# Patient Record
Sex: MALE | Race: WHITE | Employment: OTHER | URBAN - METROPOLITAN AREA
[De-identification: names, ages, dates, MRNs, and addresses within clinical notes are randomized per-mention and may not be internally consistent; named-entity substitution may affect disease eponyms.]

---

## 2018-08-29 ENCOUNTER — DOCUMENTATION ONLY (OUTPATIENT)
Dept: ORTHOPEDIC SURGERY | Age: 83
End: 2018-08-29

## 2018-08-29 ENCOUNTER — OFFICE VISIT (OUTPATIENT)
Dept: ORTHOPEDIC SURGERY | Age: 83
End: 2018-08-29

## 2018-08-29 VITALS
SYSTOLIC BLOOD PRESSURE: 151 MMHG | OXYGEN SATURATION: 96 % | DIASTOLIC BLOOD PRESSURE: 77 MMHG | HEIGHT: 68 IN | BODY MASS INDEX: 26.83 KG/M2 | HEART RATE: 79 BPM | WEIGHT: 177 LBS

## 2018-08-29 DIAGNOSIS — G89.29 CHRONIC RIGHT-SIDED LOW BACK PAIN WITH RIGHT-SIDED SCIATICA: Primary | ICD-10-CM

## 2018-08-29 DIAGNOSIS — M54.41 CHRONIC RIGHT-SIDED LOW BACK PAIN WITH RIGHT-SIDED SCIATICA: Primary | ICD-10-CM

## 2018-08-29 DIAGNOSIS — R29.898 RIGHT LEG WEAKNESS: ICD-10-CM

## 2018-08-29 PROBLEM — M54.40 CHRONIC RIGHT-SIDED LOW BACK PAIN WITH SCIATICA: Status: ACTIVE | Noted: 2018-08-29

## 2018-08-29 RX ORDER — OXYCODONE AND ACETAMINOPHEN 5; 325 MG/1; MG/1
TABLET ORAL
COMMUNITY
End: 2018-10-10

## 2018-08-29 RX ORDER — GLIPIZIDE 5 MG/1
TABLET ORAL
COMMUNITY
Start: 2018-07-06 | End: 2018-08-29 | Stop reason: DRUGHIGH

## 2018-08-29 RX ORDER — MELOXICAM 15 MG/1
TABLET ORAL
COMMUNITY
Start: 2018-08-27 | End: 2018-10-10

## 2018-08-29 NOTE — PATIENT INSTRUCTIONS
Back Pain: Care Instructions  Your Care Instructions    Back pain has many possible causes. It is often related to problems with muscles and ligaments of the back. It may also be related to problems with the nerves, discs, or bones of the back. Moving, lifting, standing, sitting, or sleeping in an awkward way can strain the back. Sometimes you don't notice the injury until later. Arthritis is another common cause of back pain. Although it may hurt a lot, back pain usually improves on its own within several weeks. Most people recover in 12 weeks or less. Using good home treatment and being careful not to stress your back can help you feel better sooner. Follow-up care is a key part of your treatment and safety. Be sure to make and go to all appointments, and call your doctor if you are having problems. It's also a good idea to know your test results and keep a list of the medicines you take. How can you care for yourself at home? · Sit or lie in positions that are most comfortable and reduce your pain. Try one of these positions when you lie down:  ¨ Lie on your back with your knees bent and supported by large pillows. ¨ Lie on the floor with your legs on the seat of a sofa or chair. Clovia Evener on your side with your knees and hips bent and a pillow between your legs. ¨ Lie on your stomach if it does not make pain worse. · Do not sit up in bed, and avoid soft couches and twisted positions. Bed rest can help relieve pain at first, but it delays healing. Avoid bed rest after the first day of back pain. · Change positions every 30 minutes. If you must sit for long periods of time, take breaks from sitting. Get up and walk around, or lie in a comfortable position. · Try using a heating pad on a low or medium setting for 15 to 20 minutes every 2 or 3 hours. Try a warm shower in place of one session with the heating pad. · You can also try an ice pack for 10 to 15 minutes every 2 to 3 hours.  Put a thin cloth between the ice pack and your skin. · Take pain medicines exactly as directed. ¨ If the doctor gave you a prescription medicine for pain, take it as prescribed. ¨ If you are not taking a prescription pain medicine, ask your doctor if you can take an over-the-counter medicine. · Take short walks several times a day. You can start with 5 to 10 minutes, 3 or 4 times a day, and work up to longer walks. Walk on level surfaces and avoid hills and stairs until your back is better. · Return to work and other activities as soon as you can. Continued rest without activity is usually not good for your back. · To prevent future back pain, do exercises to stretch and strengthen your back and stomach. Learn how to use good posture, safe lifting techniques, and proper body mechanics. When should you call for help? Call your doctor now or seek immediate medical care if:    · You have new or worsening numbness in your legs.     · You have new or worsening weakness in your legs. (This could make it hard to stand up.)     · You lose control of your bladder or bowels.    Watch closely for changes in your health, and be sure to contact your doctor if:    · You have a fever, lose weight, or don't feel well.     · You do not get better as expected. Where can you learn more? Go to http://prerna-alen.info/. Enter M559 in the search box to learn more about \"Back Pain: Care Instructions. \"  Current as of: November 29, 2017  Content Version: 11.7  © 0272-6014 [employer]. Care instructions adapted under license by [employer] (which disclaims liability or warranty for this information). If you have questions about a medical condition or this instruction, always ask your healthcare professional. [place] [address] any warranty or liability for your use of this information.

## 2018-08-29 NOTE — PROGRESS NOTES
Chief complaint/History of Present Illness:  Chief Complaint   Patient presents with    Back Pain     Lumbar    Hip Pain     right     NEW PATIENT      Fariba Nurse is a  80 y.o.  male      HISTORY OF PRESENT ILLNESS:  The patient comes in today as a new patient to our practice. He was referred here by Dr. Brynn Valdez. He is here for low back pain that radiates into his right buttock, hip, and lateral thigh, down to his ankle. He states he has seen Dr. Opal Jones for years for right hip pain. He has had injections in it. Dr. Opal Jones told him that he really felt like the pain was coming more from his back. He then sent him to Dr. Collin Aguilera who did an SI joint injection on the left, but it really did not help. In 2006, he had a laminectomy by Dr. Natalia Gold. It was an L5-S1 decompression. He comes in today complaining of right back, buttock, hip and lateral thigh pain down to his ankle. He states he has had this pain for a long time; however, over the last several weeks it increasingly worsened to the point now he is hardly able to walk. He denies any injury. He has been on Mobic 15 mg daily. Prior to that, he was on Aleve. He is also taking tramadol through his PCP, which is not helping at all. He rates his pain as a 10/10. It is increased with standing and walking and decreased with sitting. He is diabetic. His last blood sugar check was 115. He has hypertension. He has an enlarged prostate. He works a few days at [employer]. He is a nonsmoker. He denies fever, bowel or bladder dysfunction. PHYSICAL EXAMINATION:  Mr. April Garcia is an 41-year-old male. He is alert and oriented. Normal mood and affect. He has difficulty going from sitting to standing. He has a full weightbearing, antalgic gait. No assistive device. It is obvious he is in pain. He has 4/5 strength in bilateral lower extremities. Mild straight leg raise on the right.   He also has some weakness in the right leg worse than the left. ASSESSMENT/PLAN:  This is a patient who has worsening back pain with right-sided buttock, hip and leg pain down to his ankle. It is severe. He can hardly walk. I do not think he will be able to do physical therapy with this amount of pain. He does have some weakness in the right leg. We did lumbar AP and lateral x-rays that need to be read by Dr. Minnie Sorto. We are going to go ahead and obtain an MRI with contrast to evaluate his issue here to see if there may be an injection or possible repeat surgery for him. We will see him back with one of the physiatrists after the MRI. Review of systems:    Past Medical History:   Diagnosis Date    BPH (benign prostatic hyperplasia)     ED (erectile dysfunction)     Hypertension      Past Surgical History:   Procedure Laterality Date    HX TURP       Social History     Social History    Marital status:      Spouse name: N/A    Number of children: N/A    Years of education: N/A     Occupational History    Not on file. Social History Main Topics    Smoking status: Former Smoker     Types: Cigars    Smokeless tobacco: Never Used    Alcohol use No    Drug use: No    Sexual activity: Not on file     Other Topics Concern    Not on file     Social History Narrative     History reviewed. No pertinent family history. Physical Exam:  Visit Vitals    /77    Pulse 79    Ht 5' 8\" (1.727 m)    Wt 177 lb (80.3 kg)    SpO2 96%    BMI 26.91 kg/m2     Pain Scale: 10 - Worst pain ever/10          has been . reviewed and is appropriate          Diagnoses and all orders for this visit:    1. Chronic right-sided low back pain with right-sided sciatica  -     AMB POC XRAY, SPINE, LUMBOSACRAL; 2 O  -     MRI LUMB SPINE W WO CONT; Future    2.  Right leg weakness  -     AMB POC XRAY, SPINE, LUMBOSACRAL; 2 O  -     MRI LUMB SPINE W WO CONT; Future            Follow-up Disposition:  Return in about 3 weeks (around 9/19/2018) for with Dr Heather Caballero or Maureen Santiago for MRI f/u.         We have informed Erika Carrillo to notify us for immediate appointment if he has any worsening neurogical symptoms or if an emergency situation presents, then call [phone]

## 2018-08-29 NOTE — PROGRESS NOTES
MRI Lumbar with and without is scheduled at [facility], [phone], on 09/16/18, arrive 7:15AM, test 7:45AM. No Medicare RR pre-authorization required.

## 2018-09-05 ENCOUNTER — TELEPHONE (OUTPATIENT)
Dept: ORTHOPEDIC SURGERY | Age: 83
End: 2018-09-05

## 2018-09-05 NOTE — TELEPHONE ENCOUNTER
Patient needs a refill on Tramadol 50mg he takes it every 4hrs for pain. The Tramadol was prescribed by   Dr. Eros Fatima but [address] [place] [address] told them tolcall our office when refill was needed. He is going for MRI on 9/10/18 and F/U with NLP 9/19/18. She would like to know if the F/U with NLP could be moved up sooner.     Pharmacy on file    Patient can be reached at [phone]

## 2018-09-05 NOTE — TELEPHONE ENCOUNTER
I do not recall telling this patient to call us when he is out of ultram.  My note reflects that he gets it through his PCP and that it did not work well for him. He would need to come in to do a UDS and pain agreement if I told him we would prescribe it to him. I would suggest he call his PCP for a refill. Otherwise we will have to get the UDS, ORT, pain agreement, etc...before prescribing any controlled pain medication.

## 2018-09-06 PROBLEM — Z51.81 THERAPEUTIC DRUG MONITORING: Status: ACTIVE | Noted: 2018-09-06

## 2018-09-06 PROBLEM — Z79.891 LONG TERM CURRENT USE OF OPIATE ANALGESIC: Status: ACTIVE | Noted: 2018-09-06

## 2018-09-06 NOTE — TELEPHONE ENCOUNTER
Spoke with patient spouse, informed of NP [place] message below. She stated that they would attempt to get from PCP, but if unable to will walk in for UDS. No further action needed at this time.

## 2018-09-12 ENCOUNTER — OFFICE VISIT (OUTPATIENT)
Dept: ORTHOPEDIC SURGERY | Age: 83
End: 2018-09-12

## 2018-09-12 ENCOUNTER — TELEPHONE (OUTPATIENT)
Dept: ORTHOPEDIC SURGERY | Age: 83
End: 2018-09-12

## 2018-09-12 VITALS
RESPIRATION RATE: 16 BRPM | DIASTOLIC BLOOD PRESSURE: 82 MMHG | HEIGHT: 68 IN | WEIGHT: 176.6 LBS | BODY MASS INDEX: 26.76 KG/M2 | SYSTOLIC BLOOD PRESSURE: 163 MMHG | HEART RATE: 75 BPM

## 2018-09-12 DIAGNOSIS — M47.817 LUMBOSACRAL SPONDYLOSIS WITHOUT MYELOPATHY: ICD-10-CM

## 2018-09-12 DIAGNOSIS — M51.36 DDD (DEGENERATIVE DISC DISEASE), LUMBAR: Primary | ICD-10-CM

## 2018-09-12 DIAGNOSIS — M54.50 LUMBAR PAIN: Primary | ICD-10-CM

## 2018-09-12 DIAGNOSIS — M96.1 LUMBAR POST-LAMINECTOMY SYNDROME: ICD-10-CM

## 2018-09-12 DIAGNOSIS — M54.16 LUMBAR NEURITIS: ICD-10-CM

## 2018-09-12 DIAGNOSIS — M48.061 LUMBAR STENOSIS WITHOUT NEUROGENIC CLAUDICATION: ICD-10-CM

## 2018-09-12 RX ORDER — DICLOFENAC SODIUM 10 MG/G
GEL TOPICAL
COMMUNITY
Start: 2018-08-08 | End: 2019-03-21

## 2018-09-12 RX ORDER — GABAPENTIN 100 MG/1
100 CAPSULE ORAL 3 TIMES DAILY
Qty: 90 CAP | Refills: 1 | Status: SHIPPED | OUTPATIENT
Start: 2018-09-12 | End: 2018-10-10

## 2018-09-12 RX ORDER — DIAZEPAM 10 MG/1
TABLET ORAL
Qty: 1 TAB | Refills: 0 | OUTPATIENT
Start: 2018-09-12 | End: 2018-10-10

## 2018-09-12 NOTE — PROGRESS NOTES
[facility]  [address], [place]   Phone: [phone]  Fax: [phone]        PROGRESS NOTE      HISTORY OF PRESENT ILLNESS:  The patient is a 80 y.o. male previously seen by [facility], NP and was seen today for follow up of low back pain radiating into the RLE to the ankle. He had a L5/S1 laminectomy by Dr. Romana Mckee in 2006 with relief until 8/23/18. He underwent a left SI joint injection by Dr. Thierno Duarte with no relief. He denies specific injury or trauma. He describes symptoms consistent with spinal stenosis. He is followed by Dr. Krysta Azul for right hip pain. He has taken Mobic and Aleve. He reports minimal relief with Tramadol. The patient has a history of DM and reports blood sugars are well controlled, consistently remaining below 200. Note from [facility], NP dated 8/29/18 indicating patient was seen with c/o low back pain radiating into the RLE to the ankle. He was a new patient at that time. She gave him Tramadol, which he took with no relief. The patient returns today with low back pain radiating into the RLE to the ankle. He rates his pain 0-10/10. Pt is accompanied by his wife today. I have not seen this patient before. Pt denies change in bowel or bladder habits. Lumbar spine MRI dated 9/10/18 reviewed. Per report, interval L5 laminectomy since prior remote exam 1-2006. Local dural ectasia; no central stenosis, but mild to moderate bilateral foraminal stenosis from spondylosis, and possible mild far left L5 exiting root impingement from eccentric lateral spondylosis. Mild paraspinal muscle atrophy. Interval development of moderate-sized left posterolateral extruded L3/4 disc herniation, with inferior migration of fragment in situ in the right anterior epidural space behind L4, with mild impingement on the right traversing L4 nerve root in the lateral recess. Additional mild disc level L3/4 central stenosis with mild disc bulge and spondylosis. Mild to moderate bilateral foraminal stenosis with borderline right L3 exiting root impingement. Interval marked degenerative disc disease with mild disc bulge and spondylosis. Moderate central stenosis from disc and minimal degenerative grade 1 spondylolisthesis. Mild bilateral facet joint osteoarthritis. Mild to moderate right foraminal stenosis with borderline bilateral exiting L4 root impingement. Mild multilevel facet joint osteoarthritis as above. Upon review myself, level was not specified in report, slight retrolisthesis of L3 on L4 and L5 on S1. Moderate to severe central canal stenosis at L4/5.  reviewed. Body mass index is 26.85 kg/(m^2). PCP: Rola Mabry MD      Past Medical History:   Diagnosis Date    BPH (benign prostatic hyperplasia)     ED (erectile dysfunction)     Hypertension         Social History     Social History    Marital status:      Spouse name: N/A    Number of children: N/A    Years of education: N/A     Occupational History    Not on file. Social History Main Topics    Smoking status: Former Smoker     Types: Cigars    Smokeless tobacco: Never Used    Alcohol use No    Drug use: No    Sexual activity: Not on file     Other Topics Concern    Not on file     Social History Narrative       Current Outpatient Prescriptions   Medication Sig Dispense Refill    diclofenac (VOLTAREN) 1 % gel       gabapentin (NEURONTIN) 100 mg capsule Take 1 Cap by mouth three (3) times daily. 90 Cap 1    traMADol (ULTRAM) 50 mg tablet Take 1 Tab by mouth every four (4) hours as needed. Max Daily Amount: 300 mg. 48 Tab 0    meloxicam (MOBIC) 15 mg tablet       dutasteride (AVODART) 0.5 mg capsule Take 1 Cap by mouth daily. 90 Cap 3    lisinopril (PRINIVIL, ZESTRIL) 40 mg tablet Take 40 mg by mouth daily.  amLODIPine (NORVASC) 5 mg tablet Take 5 mg by mouth daily.  glipiZIDE SR (GLUCOTROL) 2.5 mg CR tablet Take  by mouth daily.       metFORMIN (GLUCOPHAGE) 500 mg tablet Take  by mouth two (2) times daily (with meals).  esomeprazole (NEXIUM) 20 mg capsule Take  by mouth daily.  aspirin delayed-release 81 mg tablet Take  by mouth daily.  multivitamin with iron (DAILY MULTI-VITAMINS/IRON) tablet Take 1 tablet by mouth daily.  VITAMIN E, DL,TOCOPHERYL ACET, (VITAMIN E ACETATE) 400 unit cap capsule Take  by mouth daily.  cinnamon bark (CINNAMON) 500 mg cap Take  by mouth.  oxyCODONE-acetaminophen (PERCOCET) 5-325 mg per tablet Take  by mouth every four (4) hours as needed for Pain.  saw palmetto 500 mg cap Take  by mouth. Allergies   Allergen Reactions    Sulfa (Sulfonamide Antibiotics) Other (comments)     Patient states not allergic          PHYSICAL EXAMINATION    Visit Vitals    /82    Pulse 75    Resp 16    Ht 5' 8\" (1.727 m)    Wt 80.1 kg (176 lb 9.6 oz)    BMI 26.85 kg/m2       CONSTITUTIONAL: NAD, A&O x 3  SENSATION: Intact to light touch throughout  RANGE OF MOTION: The patient has full passive range of motion in all four extremities. MOTOR:  Straight Leg Raise: Negative, bilateral               Hip Flex Knee Ext Knee Flex Ankle DF GTE Ankle PF Tone   Right +4/5 +4/5 +4/5 +4/5 +4/5 +4/5 +4/5   Left +4/5 +4/5 +4/5 +4/5 +4/5 +4/5 +4/5       ASSESSMENT   Diagnoses and all orders for this visit:    1. DDD (degenerative disc disease), lumbar  -     gabapentin (NEURONTIN) 100 mg capsule; Take 1 Cap by mouth three (3) times daily.  -     SCHEDULE SURGERY    2. Lumbar post-laminectomy syndrome  -     gabapentin (NEURONTIN) 100 mg capsule; Take 1 Cap by mouth three (3) times daily.  -     SCHEDULE SURGERY    3. Lumbar neuritis  -     gabapentin (NEURONTIN) 100 mg capsule; Take 1 Cap by mouth three (3) times daily.  -     SCHEDULE SURGERY    4. Lumbar stenosis without neurogenic claudication  -     gabapentin (NEURONTIN) 100 mg capsule;  Take 1 Cap by mouth three (3) times daily.  -     SCHEDULE SURGERY    5. Lumbosacral spondylosis without myelopathy  -     gabapentin (NEURONTIN) 100 mg capsule; Take 1 Cap by mouth three (3) times daily.  -     SCHEDULE SURGERY          IMPRESSION AND PLAN:  Patient presents today with low back pain radiating into the RLE to the ankle. He describes symptoms consistent with spinal stenosis. Patient wishes to proceed with blocks. I will order right-sided L4/5 SNRBs. I will try him on Neurontin 100 mg TID. The risks, benefits, and potential side effects of this medication were discussed. Patient understands and wishes to proceed. Patient advised to call the office if intolerant to new medication. Patient is neurologically intact. Patient is not interested in surgical intervention at this time. I will see the patient back following blocks. Written by Evelyn Holliday, as dictated by Keith Elmore MD  I examined the patient, reviewed and agree with the note.

## 2018-09-12 NOTE — MR AVS SNAPSHOT
[address] [facility] [place] 
 
 
 [address] [facility] 
[phone] Patient: Xochilt Escalona MRN: [id number]:[id number] Visit Information Date & Time Provider Department Dept. Phone Encounter #  
 9/12/2018 10:50 AM Mitchell Jeff  [facility], [address] [facility] [place] [phone] [id number] Follow-up Instructions Return for following block. 2/20/2019  9:15 AM  
Any with Santos Baum MD  
[facility] Appt Note: 1 YR FLOW PVR UA; .  
 [address] [address] [address] [facility]  
  
   
 [address] [facility] 23[address] [facility] 100 200 [facility] Upcoming Health Maintenance Date Due DTaP/Tdap/Td series (1 - Tdap) 9[birth year]7/1953 ZOSTER VACCINE AGE 60> 7/7/1992 GLAUCOMA SCREENING Q2Y 9/7/1997 Pneumococcal 65+ Low/Medium Risk (2 of 2 - PPSV23) 2/13/2018 MEDICARE YEARLY EXAM 3/14/2018 Influenza Age 5 to Adult 8/1/2018 Allergies as of 9/12/2018  Review Complete On: 9/12/2018 By: Mitchell Jeff MD  
  
 Severity Noted Reaction Type Reactions Sulfa (Sulfonamide Antibiotics)  01/13/2015    Other (comments) Patient states not allergic Current Immunizations  Never Reviewed No immunizations on file. Not reviewed this visit You Were Diagnosed With   
  
 Codes Comments DDD (degenerative disc disease), lumbar    -  Primary ICD-10-CM: M51.36 
ICD-9-CM: 722.52 Lumbar post-laminectomy syndrome     ICD-10-CM: M96.1 ICD-9-CM: 722.83 Lumbar neuritis     ICD-10-CM: M54.16 
ICD-9-CM: 724.4 Lumbar stenosis without neurogenic claudication     ICD-10-CM: M48.061 
ICD-9-CM: 724.02 Lumbosacral spondylosis without myelopathy     ICD-10-CM: I02.209 ICD-9-CM: 721.3 Vitals BP Pulse Resp Height(growth percentile) Weight(growth percentile) BMI 163/82 75 16 5' 8\" (1.727 m) 176 lb 9.6 oz (80.1 kg) 26.85 kg/m2 Smoking Status Former Smoker BMI and BSA Data Body Mass Index Body Surface Area  
 26.85 kg/m 2 1.96 m 2 Preferred Pharmacy Pharmacy Name Phone [facility]/PHARMACY #[id number] [address] Your Updated Medication List  
  
   
This list is accurate as of 9/12/18 12:51 PM.  Always use your most recent med list. amLODIPine 5 mg tablet Commonly known as:  Rony Chafe Take 5 mg by mouth daily. aspirin delayed-release 81 mg tablet Take  by mouth daily. CINNAMON 500 mg Cap Generic drug:  cinnamon bark Take  by mouth. DAILY MULTI-VITAMINS/IRON tablet Generic drug:  multivitamin with iron Take 1 tablet by mouth daily. diclofenac 1 % Gel Commonly known as:  VOLTAREN  
  
 dutasteride 0.5 mg capsule Commonly known as:  AVODART Take 1 Cap by mouth daily. gabapentin 100 mg capsule Commonly known as:  NEURONTIN Take 1 Cap by mouth three (3) times daily. glipiZIDE SR 2.5 mg CR tablet Commonly known as:  GLUCOTROL XL Take  by mouth daily. lisinopril 40 mg tablet Commonly known as:  Anni Critsina Take 40 mg by mouth daily. meloxicam 15 mg tablet Commonly known as:  MOBIC  
  
 metFORMIN 500 mg tablet Commonly known as:  GLUCOPHAGE Take  by mouth two (2) times daily (with meals). NexIUM 20 mg capsule Generic drug:  esomeprazole Take  by mouth daily. oxyCODONE-acetaminophen 5-325 mg per tablet Commonly known as:  PERCOCET Take  by mouth every four (4) hours as needed for Pain. saw palmetto 500 mg Cap Take  by mouth. traMADol 50 mg tablet Commonly known as:  ULTRAM  
Take 1 Tab by mouth every four (4) hours as needed. Max Daily Amount: 300 mg.  
  
 vitamin E acetate 400 unit Cap capsule Take  by mouth daily. Prescriptions Sent to Pharmacy Refills  
 gabapentin (NEURONTIN) 100 mg capsule 1 Sig: Take 1 Cap by mouth three (3) times daily. Class: Normal  
 Pharmacy: [facility]/pharmacy [address] [facility], [address] [place] [address] #: [phone] Route: Oral  
  
Follow-up Instructions Return for following block. Introducing [facility] SERVICES! Leslie Leggett introduces Endymed patient portal. Now you can access parts of your medical record, email your doctor's office, and request medication refills online. 1. In your internet browser, go to [email or web address] 2. Click on the First Time User? Click Here link in the Sign In box. You will see the New Member Sign Up page. 3. Enter your Endymed Access Code exactly as it appears below. You will not need to use this code after youve completed the sign-up process. If you do not sign up before the expiration date, you must request a new code. · Endymed Access Code: [email or web address] Expires: 11/26/2018  3:51 PM 
 
4. Enter the last four digits of your Social Security Number (xxxx) and Date of Birth (mm/dd/yyyy) as indicated and click Submit. You will be taken to the next sign-up page. 5. Create a Endymed ID. This will be your Endymed login ID and cannot be changed, so think of one that is secure and easy to remember. 6. Create a Endymed password. You can change your password at any time. 7. Enter your Password Reset Question and Answer. This can be used at a later time if you forget your password. 8. Enter your e-mail address. You will receive e-mail notification when new information is available in [address] 9. Click Sign Up. You can now view and download portions of your medical record. 10. Click the Download Summary menu link to download a portable copy of your medical information.  
 
If you have questions, please visit the Frequently Asked Questions section of the Crowdx. Remember, [email or web address] is NOT to be used for urgent needs. For medical emergencies, dial 911. Now available from your iPhone and Android! Please provide this summary of care documentation to your next provider. Your primary care clinician is listed as Mark Mendosa. If you have any questions after today's visit, please call [phone].

## 2018-09-12 NOTE — TELEPHONE ENCOUNTER
Patient states that she understands now. She was confused since the bottle said 1 tab 3 times a day and the instructions were ramping instructions.

## 2018-09-12 NOTE — TELEPHONE ENCOUNTER
PATIENTS WIFE CALLED AND STATED THAT THEY HAD PICKED UP THE NEURONTIN FROM THE PHARMACY AND THAT IT SAYS TO TAKE 1 TAB TID BUT  SHE STATED THAT SHE WAS TOLD BY CLINICAL STAFF TO TAKE 1 DAILY AT NIGHT.  PLEASE CALL PATIENTS WIFE FOR CLARIFICATION

## 2018-09-13 DIAGNOSIS — Z79.891 LONG TERM CURRENT USE OF OPIATE ANALGESIC: ICD-10-CM

## 2018-09-13 DIAGNOSIS — M54.41 CHRONIC RIGHT-SIDED LOW BACK PAIN WITH RIGHT-SIDED SCIATICA: ICD-10-CM

## 2018-09-13 DIAGNOSIS — Z51.81 THERAPEUTIC DRUG MONITORING: ICD-10-CM

## 2018-09-13 DIAGNOSIS — G89.29 CHRONIC RIGHT-SIDED LOW BACK PAIN WITH RIGHT-SIDED SCIATICA: ICD-10-CM

## 2018-09-24 ENCOUNTER — TELEPHONE (OUTPATIENT)
Dept: ORTHOPEDIC SURGERY | Age: 83
End: 2018-09-24

## 2018-09-24 NOTE — TELEPHONE ENCOUNTER
Per the note, he took Tramadol with no relief so we will not be refilling this. The SI can take up to 10 days to become effective. Sometimes, we have to repeat the injection. If he is tolerating the Gabapentin 100 mg TID, we can increase his nightly dose to 200 mg to try and better cover the pain. We may need to continue increasing this but we have to go slowly due to his age.

## 2018-09-25 NOTE — TELEPHONE ENCOUNTER
Pt's wife called for status update:     Pt wife states the tramadol is the only thing that helps. He's been taking it all through his treatment.     Please call her back at U#[phone]

## 2018-09-25 NOTE — TELEPHONE ENCOUNTER
Spoke with ms Dago Cooley. She states that the Tramadol was the only thing helping him and he is in a lot of pain. I relayed FREDI Ferro's last message to increase Gabapentin to 200MG at night. Advised Ms. Dago Cooley to give us a call back if that does not help.

## 2018-10-02 ENCOUNTER — OFFICE VISIT (OUTPATIENT)
Dept: ORTHOPEDIC SURGERY | Age: 83
End: 2018-10-02

## 2018-10-02 VITALS
OXYGEN SATURATION: 97 % | SYSTOLIC BLOOD PRESSURE: 175 MMHG | HEIGHT: 68 IN | WEIGHT: 175 LBS | DIASTOLIC BLOOD PRESSURE: 80 MMHG | HEART RATE: 69 BPM | BODY MASS INDEX: 26.52 KG/M2

## 2018-10-02 DIAGNOSIS — M48.061 LUMBAR STENOSIS WITHOUT NEUROGENIC CLAUDICATION: ICD-10-CM

## 2018-10-02 DIAGNOSIS — M96.1 LUMBAR POST-LAMINECTOMY SYNDROME: ICD-10-CM

## 2018-10-02 DIAGNOSIS — M51.36 DDD (DEGENERATIVE DISC DISEASE), LUMBAR: Primary | ICD-10-CM

## 2018-10-02 RX ORDER — DULOXETIN HYDROCHLORIDE 20 MG/1
20 CAPSULE, DELAYED RELEASE ORAL DAILY
Qty: 30 CAP | Refills: 1 | Status: SHIPPED | OUTPATIENT
Start: 2018-10-02 | End: 2018-11-27 | Stop reason: SDUPTHER

## 2018-10-02 NOTE — PROGRESS NOTES
[facility]  [address], [address] [place]   Phone: [phone]  Fax: [phone]        PROGRESS NOTE      HISTORY OF PRESENT ILLNESS:  The patient is a 80 y.o. male and was seen today for follow up of low back pain radiating into the RLE to the ankle. He had a L5/S1 laminectomy by Dr. Maico Montano in 2006 with relief until 8/23/18. He underwent a left SI joint injection by Dr. Janneth Salcedo in 2015 with no relief. He denies specific injury or trauma. He describes symptoms consistent with spinal stenosis. [facility] He has taken Mobic and Aleve. He reports minimal relief with Tramadol. The patient has a history of DM and reports blood sugars are well controlled, consistently remaining below 200. Note from Carl Lorenz NP dated 8/29/18 indicating patient was seen with c/o low back pain radiating into the RLE to the ankle. He was a new patient at that time. She gave him Tramadol, which he took with no relief. Lumbar spine MRI dated 9/10/18 reviewed. Per report, interval L5 laminectomy since prior remote exam 1-2006. Local dural ectasia; no central stenosis, but mild to moderate bilateral foraminal stenosis from spondylosis, and possible mild far left L5 exiting root impingement from eccentric lateral spondylosis. Mild paraspinal muscle atrophy. Interval development of moderate-sized left posterolateral extruded L3/4 disc herniation, with inferior migration of fragment in situ in the right anterior epidural space behind L4, with mild impingement on the right traversing L4 nerve root in the lateral recess. Additional mild disc level L3/4 central stenosis with mild disc bulge and spondylosis. Mild to moderate bilateral foraminal stenosis with borderline right L3 exiting root impingement. Interval marked degenerative disc disease with mild disc bulge and spondylosis.  Moderate central stenosis from disc and minimal degenerative grade 1 spondylolisthesis. Mild bilateral facet joint osteoarthritis. Mild to moderate right foraminal stenosis with borderline bilateral exiting L4 root impingement. Mild multilevel facet joint osteoarthritis as above. Upon review myself, level was not specified in report, slight retrolisthesis of L3 on L4 and L5 on S1. Moderate to severe central canal stenosis at L4/5. At his last clinical appointment, patient presented with low back pain radiating into the RLE to the ankle. He described symptoms consistent with spinal stenosis. Patient wished to proceed with blocks. I ordered right-sided L4/5 SNRBs. I tried him on Neurontin 100 mg TID. The patient returns today with low back and BLE pain to the bilateral knees. He rates his pain 10/10, consistent with his last visit (0-10/10). Pt is accompanied by his wife today. He is not tolerating Neurontin 100 mg TID due to lethargy. He continues to describe symptoms consistent with spinal stenosis. Pt denies change in bowel or bladder habits. Pt underwent right-sided L4/5 SNRBs on 9/18/18 with no relief.  reviewed. Body mass index is 26.61 kg/(m^2). PCP: Nikki Paula MD      Past Medical History:   Diagnosis Date    BPH (benign prostatic hyperplasia)     ED (erectile dysfunction)     Hypertension         Social History     Social History    Marital status:      Spouse name: N/A    Number of children: N/A    Years of education: N/A     Occupational History    Not on file. Social History Main Topics    Smoking status: Former Smoker     Types: Cigars    Smokeless tobacco: Never Used    Alcohol use No    Drug use: No    Sexual activity: Not on file     Other Topics Concern    Not on file     Social History Narrative       Current Outpatient Prescriptions   Medication Sig Dispense Refill    DULoxetine (CYMBALTA) 20 mg capsule Take 1 Cap by mouth daily.  Indications: NEUROPATHIC PAIN 30 Cap 1    diclofenac (VOLTAREN) 1 % gel       gabapentin (NEURONTIN) 100 mg capsule Take 1 Cap by mouth three (3) times daily. 90 Cap 1    traMADol (ULTRAM) 50 mg tablet Take 1 Tab by mouth every four (4) hours as needed. Max Daily Amount: 300 mg. 48 Tab 0    meloxicam (MOBIC) 15 mg tablet       oxyCODONE-acetaminophen (PERCOCET) 5-325 mg per tablet Take  by mouth every four (4) hours as needed for Pain.  dutasteride (AVODART) 0.5 mg capsule Take 1 Cap by mouth daily. 90 Cap 3    lisinopril (PRINIVIL, ZESTRIL) 40 mg tablet Take 40 mg by mouth daily.  amLODIPine (NORVASC) 5 mg tablet Take 5 mg by mouth daily.  glipiZIDE SR (GLUCOTROL) 2.5 mg CR tablet Take  by mouth daily.  metFORMIN (GLUCOPHAGE) 500 mg tablet Take  by mouth two (2) times daily (with meals).  esomeprazole (NEXIUM) 20 mg capsule Take  by mouth daily.  aspirin delayed-release 81 mg tablet Take  by mouth daily.  multivitamin with iron (DAILY MULTI-VITAMINS/IRON) tablet Take 1 tablet by mouth daily.  VITAMIN E, DL,TOCOPHERYL ACET, (VITAMIN E ACETATE) 400 unit cap capsule Take  by mouth daily.  cinnamon bark (CINNAMON) 500 mg cap Take  by mouth.  diazePAM (VALIUM) 10 mg tablet TAKE 1 TAB BY MOUTH AS DIRECTED BY NURSE PRIOR TO PROCEDURE 1 Tab 0    saw palmetto 500 mg cap Take  by mouth. Allergies   Allergen Reactions    Sulfa (Sulfonamide Antibiotics) Other (comments)     Patient states not allergic          PHYSICAL EXAMINATION    Visit Vitals    /80    Pulse 69    Ht 5' 8\" (1.727 m)    Wt 79.4 kg (175 lb)    SpO2 97%    BMI 26.61 kg/m2       CONSTITUTIONAL: NAD, A&O x 3  SENSATION: Intact to light touch throughout  RANGE OF MOTION: The patient has full passive range of motion in all four extremities.   MOTOR:  Straight Leg Raise: Negative, bilateral               Hip Flex Knee Ext Knee Flex Ankle DF GTE Ankle PF Tone   Right +4/5 +4/5 +4/5 +4/5 +4/5 +4/5 +4/5   Left +4/5 +4/5 +4/5 +4/5 +4/5 +4/5 +4/5       ASSESSMENT   Diagnoses and all orders for this visit:    1. DDD (degenerative disc disease), lumbar  -     DULoxetine (CYMBALTA) 20 mg capsule; Take 1 Cap by mouth daily. Indications: NEUROPATHIC PAIN  -     REFERRAL TO SPINE SURGERY    2. Lumbar stenosis without neurogenic claudication  -     DULoxetine (CYMBALTA) 20 mg capsule; Take 1 Cap by mouth daily. Indications: NEUROPATHIC PAIN  -     REFERRAL TO SPINE SURGERY    3. Lumbar post-laminectomy syndrome  -     DULoxetine (CYMBALTA) 20 mg capsule; Take 1 Cap by mouth daily. Indications: NEUROPATHIC PAIN  -     REFERRAL TO SPINE SURGERY          IMPRESSION AND PLAN:  Patient is s/p right-sided L4/5 SNRBs on 9/18/18 noting no relief. Patient should wean off Neurontin 100 mg TID. Patient wishes to proceed with surgical intervention. I will refer him to Dr. Fer Ramírez for surgical evaluation. I will try him on Cymbalta 20 mg daily. The risks, benefits, and potential side effects of this medication were discussed. Patient understands and wishes to proceed. Patient advised to call the office if intolerant to new medication. Patient is neurologically intact. Patient is not interested in additional lumbar blocks at this time. I will see the patient back prn. Written by Jo Houston, as dictated by Yesenia Lechuga MD  I examined the patient, reviewed and agree with the note.

## 2018-10-02 NOTE — MR AVS SNAPSHOT
[address] [facility], [place] 
 
 
 [address] [facility] 
[phone] Patient: Eric Garces MRN: [id number] FXS:[id number] Visit Information Date & Time Provider Department Dept. Phone Encounter #  
 10/2/2018 11:00 AM Anuja Griggs MD [facility] [place] [phone] [id number] Follow-up Instructions Return if symptoms worsen or fail to improve. 2/20/2019  9:15 AM  
Any with Corby Bull MD  
[facility] ([address] [facility]) Appt Note: 1 YR FLOW PVR UA; .  
 [address] [address] [address] [facility]  
  
   
 [address] [facility] [address] [facility][note 2] 100 [address] [facility] Upcoming Health Maintenance Date Due DTaP/Tdap/Td series (1 - Tdap) 9/7/1953 Shingrix Vaccine Age 50> (1 of 2) 9/7/1982 GLAUCOMA SCREENING Q2Y 9/7/1997 Pneumococcal 65+ Low/Medium Risk (2 of 2 - PPSV23) 2/13/2018 MEDICARE YEARLY EXAM 3/14/2018 Influenza Age 5 to Adult 8/1/2018 Allergies as of 10/2/2018  Review Complete On: 10/2/2018 By: Anuja Griggs MD  
  
 Severity Noted Reaction Type Reactions Sulfa (Sulfonamide Antibiotics)  01/13/2015    Other (comments) Patient states not allergic Current Immunizations  Never Reviewed No immunizations on file. Not reviewed this visit You Were Diagnosed With   
  
 Codes Comments DDD (degenerative disc disease), lumbar    -  Primary ICD-10-CM: M51.36 
ICD-9-CM: 722.52 Lumbar stenosis without neurogenic claudication     ICD-10-CM: M48.061 
ICD-9-CM: 724.02 Lumbar post-laminectomy syndrome     ICD-10-CM: M96.1 ICD-9-CM: 722.83 Vitals BP Pulse Height(growth percentile) Weight(growth percentile) SpO2 BMI  
 175/80 69 5' 8\" (1.727 m) 175 lb (79.4 kg) 97% 26.61 kg/m2 Smoking Status Former Smoker BMI and BSA Data Body Mass Index Body Surface Area  
 26.61 kg/m 2 1.95 m 2 Preferred Pharmacy Pharmacy Name Phone [facility] #[id number] [address] [address] Your Updated Medication List  
  
   
This list is accurate as of 10/2/18 12:31 PM.  Always use your most recent med list. amLODIPine 5 mg tablet Commonly known as:  Nai Rising Take 5 mg by mouth daily. aspirin delayed-release 81 mg tablet Take  by mouth daily. CINNAMON 500 mg Cap Generic drug:  cinnamon bark Take  by mouth. DAILY MULTI-VITAMINS/IRON tablet Generic drug:  multivitamin with iron Take 1 tablet by mouth daily. diazePAM 10 mg tablet Commonly known as:  VALIUM  
TAKE 1 TAB BY MOUTH AS DIRECTED BY NURSE PRIOR TO PROCEDURE  
  
 diclofenac 1 % Gel Commonly known as:  VOLTAREN  
  
 DULoxetine 20 mg capsule Commonly known as:  CYMBALTA Take 1 Cap by mouth daily. Indications: NEUROPATHIC PAIN  
  
 dutasteride 0.5 mg capsule Commonly known as:  AVODART Take 1 Cap by mouth daily. gabapentin 100 mg capsule Commonly known as:  NEURONTIN Take 1 Cap by mouth three (3) times daily. glipiZIDE SR 2.5 mg CR tablet Commonly known as:  GLUCOTROL XL Take  by mouth daily. lisinopril 40 mg tablet Commonly known as:  León Rye Take 40 mg by mouth daily. meloxicam 15 mg tablet Commonly known as:  MOBIC  
  
 metFORMIN 500 mg tablet Commonly known as:  GLUCOPHAGE Take  by mouth two (2) times daily (with meals). NexIUM 20 mg capsule Generic drug:  esomeprazole Take  by mouth daily. oxyCODONE-acetaminophen 5-325 mg per tablet Commonly known as:  PERCOCET Take  by mouth every four (4) hours as needed for Pain. saw palmetto 500 mg Cap Take  by mouth. traMADol 50 mg tablet Commonly known as:  ULTRAM  
Take 1 Tab by mouth every four (4) hours as needed. Max Daily Amount: 300 mg. vitamin E acetate 400 unit Cap capsule Take  by mouth daily. Prescriptions Sent to Pharmacy Refills DULoxetine (CYMBALTA) 20 mg capsule 1 Sig: Take 1 Cap by mouth daily. Indications: NEUROPATHIC PAIN Class: Normal  
 Pharmacy: [facility] [address], [address] [place] [address] #: [phone] Route: Oral  
  
We Performed the Following REFERRAL TO SPINE SURGERY [EDL043 Custom] Comments:  
 SC per NLP Follow-up Instructions Return if symptoms worsen or fail to improve. Referral Information Referral ID Referred By Referred To  
  
 [id number] Robinson Ford MD [address] [facility], [address] Phone: [phone] Fax: [phone] Visits Status Start Date End Date 1 New Request 10/2/18 10/2/19 If your referral has a status of pending review or denied, additional information will be sent to support the outcome of this decision. Introducing [facility]! [employer] introduces [employer] patient portal. Now you can access parts of your medical record, email your doctor's office, and request medication refills online. 1. In your internet browser, go to [email or web address] 2. Click on the First Time User? Click Here link in the Sign In box. You will see the New Member Sign Up page. 3. Enter your [employer] Access Code exactly as it appears below. You will not need to use this code after youve completed the sign-up process. If you do not sign up before the expiration date, you must request a new code. · [employer] Access Code: [email or web address] Expires: 11/26/2018  3:51 PM 
 
4. Enter the last four digits of your Social Security Number (xxxx) and Date of Birth (mm/dd/yyyy) as indicated and click Submit. You will be taken to the next sign-up page. 5. Create a [employer] ID.  This will be your [employer] login ID and cannot be changed, so think of one that is secure and easy to remember. 6. Create a [employer] password. You can change your password at any time. 7. Enter your Password Reset Question and Answer. This can be used at a later time if you forget your password. 8. Enter your e-mail address. You will receive e-mail notification when new information is available in [address] 9. Click Sign Up. You can now view and download portions of your medical record. 10. Click the Download Summary menu link to download a portable copy of your medical information. If you have questions, please visit the Frequently Asked Questions section of the [employer] website. Remember, [employer] is NOT to be used for urgent needs. For medical emergencies, dial 911. Now available from your iPhone and Android! Please provide this summary of care documentation to your next provider. Your primary care clinician is listed as Jose Arriaga. If you have any questions after today's visit, please call [phone].

## 2018-10-05 ENCOUNTER — OFFICE VISIT (OUTPATIENT)
Dept: ORTHOPEDIC SURGERY | Age: 83
End: 2018-10-05

## 2018-10-05 VITALS
RESPIRATION RATE: 16 BRPM | HEIGHT: 68 IN | SYSTOLIC BLOOD PRESSURE: 184 MMHG | BODY MASS INDEX: 27.07 KG/M2 | TEMPERATURE: 98.2 F | DIASTOLIC BLOOD PRESSURE: 73 MMHG | OXYGEN SATURATION: 98 % | HEART RATE: 63 BPM | WEIGHT: 178.6 LBS

## 2018-10-05 DIAGNOSIS — M51.26 HNP (HERNIATED NUCLEUS PULPOSUS), LUMBAR: Primary | ICD-10-CM

## 2018-10-05 NOTE — PATIENT INSTRUCTIONS

## 2018-10-05 NOTE — PROGRESS NOTES
[address] 
[address], [address] Phone: [phone] Fax: [phone] INITIAL CONSULTATION Patient: Dana Esqueda                MRN: [id number]       SSN: [id number] YOB: 1932        AGE: 80 y.o. SEX: male Body mass index is 27.16 kg/(m^2). PCP: Mo Ni MD 
10/05/18 Chief Complaint Patient presents with  Back Pain Lower  Hip Pain Right  Follow-up Surgery Consult HISTORY OF PRESENT ILLNESS, RADIOGRAPHS, and PLAN:  
 
 
 
HISTORY OF PRESENT ILLNESS:  Mr. Ez Ang is seen today at the request of Dr. Khushi Mejia and Dr. Sara Maya. The patient is a pleasant, 80year-old gentleman with terrible pain since 08/23/2018, and atraumatically began to have this horrible right-sided radicular pain in his right leg. Past history is significant for a previous lumbar decompression at L5 in 2006 by Dr. Hernán Mcmahan. He has had trials of injections and medications without improvement. His studies demonstrate a large extruded sequestered disc herniation at L3-L4 on the left. 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 Otherwise, healthy. He plays the "[employer]. Works at [employer]. PHYSICAL EXAMINATION:  He is neurologically intact, other than his severe unremitting right-sided radicular pain. Good strength of his EHL, tibialis anterior, hamstrings and quads. ASSESSMENT/PLAN:  This is a patient who is a diabetic nonsmoker with severe radicular right leg pain, unresponsive to conservative treatments. I would proceed with a right-sided L3-L4 laminotomy and discectomy for this extruded sequestered disc herniation. Risks, benefits, complications and alternatives discussed. The patient consents. We will proceed with surgery once the appropriate approvals and clearances take place. Past Medical History: Diagnosis Date  BPH (benign prostatic hyperplasia)  ED (erectile dysfunction)  Hypertension No family history on file. Current Outpatient Prescriptions Medication Sig Dispense Refill  DULoxetine (CYMBALTA) 20 mg capsule Take 1 Cap by mouth daily. Indications: NEUROPATHIC PAIN 30 Cap 1  diclofenac (VOLTAREN) 1 % gel  gabapentin (NEURONTIN) 100 mg capsule Take 1 Cap by mouth three (3) times daily. 90 Cap 1  diazePAM (VALIUM) 10 mg tablet TAKE 1 TAB BY MOUTH AS DIRECTED BY NURSE PRIOR TO PROCEDURE 1 Tab 0  
 traMADol (ULTRAM) 50 mg tablet Take 1 Tab by mouth every four (4) hours as needed. Max Daily Amount: 300 mg. 48 Tab 0  
 meloxicam (MOBIC) 15 mg tablet  oxyCODONE-acetaminophen (PERCOCET) 5-325 mg per tablet Take  by mouth every four (4) hours as needed for Pain.  dutasteride (AVODART) 0.5 mg capsule Take 1 Cap by mouth daily. 90 Cap 3  
 saw palmetto 500 mg cap Take  by mouth.  lisinopril (PRINIVIL, ZESTRIL) 40 mg tablet Take 40 mg by mouth daily.  amLODIPine (NORVASC) 5 mg tablet Take 5 mg by mouth daily.  glipiZIDE SR (GLUCOTROL) 2.5 mg CR tablet Take  by mouth daily.  metFORMIN (GLUCOPHAGE) 500 mg tablet Take  by mouth two (2) times daily (with meals).  esomeprazole (NEXIUM) 20 mg capsule Take  by mouth daily.  aspirin delayed-release 81 mg tablet Take  by mouth daily.  multivitamin with iron (DAILY MULTI-VITAMINS/IRON) tablet Take 1 tablet by mouth daily.  VITAMIN E, DL,TOCOPHERYL ACET, (VITAMIN E ACETATE) 400 unit cap capsule Take  by mouth daily.  cinnamon bark (CINNAMON) 500 mg cap Take  by mouth. Allergies Allergen Reactions  Sulfa (Sulfonamide Antibiotics) Other (comments) Patient states not allergic Past Surgical History:  
Procedure Laterality Date  HX TURP Past Medical History:  
Diagnosis Date  BPH (benign prostatic hyperplasia)  ED (erectile dysfunction)  Hypertension Social History Social History  Marital status:  Spouse name: N/A  
 Number of children: N/A  
 Years of education: N/A Occupational History  Not on file. Social History Main Topics  Smoking status: Former Smoker Types: Cigars  Smokeless tobacco: Never Used  Alcohol use No  
 Drug use: No  
 Sexual activity: Not on file Other Topics Concern  Not on file Social History Narrative REVIEW OF SYSTEMS: 
 CONSTITUTIONAL SYMPTOMS:  Negative. EYES:  Negative. EARS, NOSE, THROAT AND MOUTH:  Negative. CARDIOVASCULAR:  Negative. RESPIRATORY:  Negative. GENITOURINARY: Per HPI. GASTROINTESTINAL:  Per HPI. INTEGUMENTARY (SKIN AND/OR BREAST):  Negative. MUSCULOSKELETAL: Per HPI. ENDOCRINE/RHEUMATOLOGIC:  Negative. NEUROLOGICAL:  Per HPI. HEMATOLOGIC/LYMPHATIC:  Negative. ALLERGIC/IMMUNOLOGIC:  Negative. PSYCHIATRIC:  Negative. PHYSICAL EXAMINATION:  
Visit Vitals  /73  Pulse 63  Temp 98.2 °F (36.8 °C)  Resp 16  
 Ht 5' 8\" (1.727 m)  Wt 178 lb 9.6 oz (81 kg)  SpO2 98%  BMI 27.16 kg/m2 PAIN SCALE: 10 - Worst pain ever/10 CONSTITUTIONAL: The patient is in no apparent distress and is alert and oriented x 3. HEENT: Normocephalic. Hearing grossly intact. NECK: Supple and symmetric. no tenderness, or masses were felt. RESPIRATORY: No labored breathing. CARDIOVASCULAR: The carotid pulses were normal. Peripheral pulses were 2+. CHEST: Normal AP diameter and normal contour without any kyphoscoliosis. LYMPHATIC: No lymphadenopathy was appreciated in the neck, axillae or groin. SKIN:  Negative for scars, rashes, lesions, or ulcers on the right upper, right lower, left upper, left lower and trunk. NEUROLOGICAL: Alert and oriented x 3. Ambulation without assistive device. FWB. EXTREMITIES:  See musculoskeletal. 
MUSCULOSKELETAL: 
 ? Head and Neck:  Negative for misalignment, asymmetry, crepitation, defects, tenderness masses or effusions. ? Left Upper Extremity: Inspection, percussion and palpation performed. Cowarts sign is negative. ? Right Upper Extremity: Inspection, percussion and palpation performed. Cowarts sign is negative. ? Spine, Ribs and Pelvis: Low back pain. Right buttock pain radiating down RLE, laterally. Inspection, percussion and palpation performed. Negative for misalignment, asymmetry, crepitation, defects, tenderness masses or effusions. ? Left Lower Extremity: Inspection, percussion and palpation performed. Negative straight leg raise. ? Right Lower Extremity: Sharp pain. Inspection, percussion and palpation performed. Negative straight leg raise. SPINE EXAM:  
 
Cervical spine: Neck is midline. Normal muscle tone. No focal atrophy is noted. Lumbar spine: No rash, ecchymosis, or gross obliquity. No focal atrophy is noted. ASSESSMENT 
  ICD-10-CM ICD-9-CM 1. HNP (herniated nucleus pulposus), lumbar M51.26 722.10 Written by Toyin Booth, as dictated by Trevor Lopes MD. 
 
I, Dr. Trevor Lopes MD, confirm that all documentation is accurate.

## 2018-10-10 NOTE — H&P
Pre-Admission History and Physical 
 
Patient: Estiven Canas   MRN: [id number]   SSN: [id number] YOB: 1932   Age: 80 y.o. Sex: male Patient scheduled for: right L3/4 lami disc. Date of surgery: 10/15/18. [facility] Surgeon: Chacorta Cooley MD 
 
HPI:  Estiven Canas is a 80 y.o. male with horrible right-sided radicular pain in his right leg. Past history is significant for a previous lumbar decompression at L5 in 2006 by Dr. Skylar Faustin. He has had trials of injections and medications without improvement. His studies demonstrate a large extruded sequestered disc herniation at L3-L4 on the left. 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 Percell Jamilah He reports a pain level of 10/10. Pain has impacted the patient's functional ability to most activities without pain. He is being admitted for surgical intervention. Past Medical History:  
Diagnosis Date  BPH (benign prostatic hyperplasia)  ED (erectile dysfunction)  Hypertension Social History Social History  Marital status:  Spouse name: N/A  
 Number of children: N/A  
 Years of education: N/A Social History Main Topics  Smoking status: Former Smoker Types: Cigars  Smokeless tobacco: Never Used  Alcohol use No  
 Drug use: No  
 Sexual activity: Not on file Other Topics Concern  Not on file Social History Narrative Past Surgical History:  
Procedure Laterality Date  HX TURP No family history on file. Allergies Allergen Reactions  Sulfa (Sulfonamide Antibiotics) Other (comments) Patient states not allergic Current Outpatient Prescriptions Medication Sig Dispense Refill  DULoxetine (CYMBALTA) 20 mg capsule Take 1 Cap by mouth daily. Indications: NEUROPATHIC PAIN 30 Cap 1  diclofenac (VOLTAREN) 1 % gel  gabapentin (NEURONTIN) 100 mg capsule Take 1 Cap by mouth three (3) times daily. 90 Cap 1  diazePAM (VALIUM) 10 mg tablet TAKE 1 TAB BY MOUTH AS DIRECTED BY NURSE PRIOR TO PROCEDURE 1 Tab 0  
 traMADol (ULTRAM) 50 mg tablet Take 1 Tab by mouth every four (4) hours as needed. Max Daily Amount: 300 mg. 48 Tab 0  
 meloxicam (MOBIC) 15 mg tablet  oxyCODONE-acetaminophen (PERCOCET) 5-325 mg per tablet Take  by mouth every four (4) hours as needed for Pain.  dutasteride (AVODART) 0.5 mg capsule Take 1 Cap by mouth daily. 90 Cap 3  
 saw palmetto 500 mg cap Take  by mouth.  lisinopril (PRINIVIL, ZESTRIL) 40 mg tablet Take 40 mg by mouth daily.  amLODIPine (NORVASC) 5 mg tablet Take 5 mg by mouth daily.  glipiZIDE SR (GLUCOTROL) 2.5 mg CR tablet Take  by mouth daily.  metFORMIN (GLUCOPHAGE) 500 mg tablet Take  by mouth two (2) times daily (with meals).  esomeprazole (NEXIUM) 20 mg capsule Take  by mouth daily.  aspirin delayed-release 81 mg tablet Take  by mouth daily.  multivitamin with iron (DAILY MULTI-VITAMINS/IRON) tablet Take 1 tablet by mouth daily.  VITAMIN E, DL,TOCOPHERYL ACET, (VITAMIN E ACETATE) 400 unit cap capsule Take  by mouth daily.  cinnamon bark (CINNAMON) 500 mg cap Take  by mouth. ROS:  Denies chills, fever,night sweats,  bowel or bladder dysfunction, unexplained weight loss/weight gain, chest pain, sob or anxiety. Physical Examination Gen: Well developed, well nourished 80 y.o. male /73  Pulse 63  Temp 98.2 °F (36.8 °C)  Resp 16  
 Ht 5' 8\" (1.727 m)  Wt 178 lb 9.6 oz (81 kg)  SpO2 98%  BMI 27.16 kg/m2 PAIN SCALE: 10 - Worst pain ever/10 
  
CONSTITUTIONAL: The patient is in no apparent distress and is alert and oriented x 3. HEENT: Normocephalic. Hearing grossly intact. NECK: Supple and symmetric. no tenderness, or masses were felt. RESPIRATORY: No labored breathing. CARDIOVASCULAR: The carotid pulses were normal. Peripheral pulses were 2+. CHEST: Normal AP diameter and normal contour without any kyphoscoliosis. LYMPHATIC: No lymphadenopathy was appreciated in the neck, axillae or groin. SKIN:  Negative for scars, rashes, lesions, or ulcers on the right upper, right lower, left upper, left lower and trunk. NEUROLOGICAL: Alert and oriented x 3. Ambulation without assistive device. FWB. EXTREMITIES:  See musculoskeletal. 
MUSCULOSKELETAL: 
· Head and Neck:  Negative for misalignment, asymmetry, crepitation, defects, tenderness masses or effusions. · Left Upper Extremity: Inspection, percussion and palpation performed. Cowarts sign is negative. · Right Upper Extremity: Inspection, percussion and palpation performed. Cowarts sign is negative. · Spine, Ribs and Pelvis: Low back pain. Right buttock pain radiating down RLE, laterally. Inspection, percussion and palpation performed. Negative for misalignment, asymmetry, crepitation, defects, tenderness masses or effusions. · Left Lower Extremity: Inspection, percussion and palpation performed. Negative straight leg raise. · Right Lower Extremity: Sharp pain. Inspection, percussion and palpation performed. Negative straight leg raise. 
  
  
SPINE EXAM:  
  
Cervical spine: Neck is midline. Normal muscle tone. No focal atrophy is noted. 
  
Lumbar spine: No rash, ecchymosis, or gross obliquity. No focal atrophy is noted.  
  
  
 
 
Assessment and Plan Due to the pt's persistent symptoms unrelieved by conservative measure Marva Serra is being admitted to [facility] to undergo surgical intervention. The post-operative plan of care consists of physical therapy, home health and a 2 week f/u office visit. We are pending medical clearance by Dr. Chica Woods.   The risks, benefits, complications and alternatives to surgery have been discussed in detail with the patient. The patient understands and agrees to proceed.   
 
MOLLY Doll dictating for Sylvie Nelson MD

## 2018-10-13 ENCOUNTER — ANESTHESIA EVENT (OUTPATIENT)
Dept: SURGERY | Age: 83
End: 2018-10-13
Payer: MEDICARE

## 2018-10-15 ENCOUNTER — HOSPITAL ENCOUNTER (OUTPATIENT)
Age: 83
Setting detail: OBSERVATION
Discharge: HOME | End: 2018-10-16
Attending: ORTHOPAEDIC SURGERY | Admitting: ORTHOPAEDIC SURGERY
Payer: MEDICARE

## 2018-10-15 ENCOUNTER — HOME HEALTH ADMISSION (OUTPATIENT)
Dept: HOME HEALTH SERVICES | Facility: HOME HEALTH | Age: 83
End: 2018-10-15
Payer: MEDICARE

## 2018-10-15 ENCOUNTER — ANESTHESIA (OUTPATIENT)
Dept: SURGERY | Age: 83
End: 2018-10-15
Payer: MEDICARE

## 2018-10-15 ENCOUNTER — APPOINTMENT (OUTPATIENT)
Dept: GENERAL RADIOLOGY | Age: 83
End: 2018-10-15
Attending: ORTHOPAEDIC SURGERY
Payer: MEDICARE

## 2018-10-15 DIAGNOSIS — M51.26 LUMBAR DISC HERNIATION: Primary | ICD-10-CM

## 2018-10-15 DIAGNOSIS — M51.26 HNP (HERNIATED NUCLEUS PULPOSUS), LUMBAR: ICD-10-CM

## 2018-10-15 LAB
GLUCOSE BLD STRIP.AUTO-MCNC: 129 MG/DL (ref 70–110)
GLUCOSE BLD STRIP.AUTO-MCNC: 143 MG/DL (ref 70–110)
GLUCOSE BLD STRIP.AUTO-MCNC: 178 MG/DL (ref 70–110)

## 2018-10-15 PROCEDURE — 74011250637 HC RX REV CODE- 250/637: Performed by: ORTHOPAEDIC SURGERY

## 2018-10-15 PROCEDURE — 77030004402 HC BUR NEUR STRY -C: Performed by: ORTHOPAEDIC SURGERY

## 2018-10-15 PROCEDURE — 77030032490 HC SLV COMPR SCD KNE COVD -B: Performed by: ORTHOPAEDIC SURGERY

## 2018-10-15 PROCEDURE — 74011250636 HC RX REV CODE- 250/636: Performed by: NURSE ANESTHETIST, CERTIFIED REGISTERED

## 2018-10-15 PROCEDURE — G8980 MOBILITY D/C STATUS: HCPCS

## 2018-10-15 PROCEDURE — 74011250636 HC RX REV CODE- 250/636: Performed by: ORTHOPAEDIC SURGERY

## 2018-10-15 PROCEDURE — 74011250636 HC RX REV CODE- 250/636

## 2018-10-15 PROCEDURE — 74011000272 HC RX REV CODE- 272: Performed by: ORTHOPAEDIC SURGERY

## 2018-10-15 PROCEDURE — 99218 HC RM OBSERVATION: CPT

## 2018-10-15 PROCEDURE — 74011250636 HC RX REV CODE- 250/636: Performed by: NURSE PRACTITIONER

## 2018-10-15 PROCEDURE — 77030020782 HC GWN BAIR PAWS FLX 3M -B: Performed by: ORTHOPAEDIC SURGERY

## 2018-10-15 PROCEDURE — 77030003029 HC SUT VCRL J&J -B: Performed by: ORTHOPAEDIC SURGERY

## 2018-10-15 PROCEDURE — 76210000006 HC OR PH I REC 0.5 TO 1 HR: Performed by: ORTHOPAEDIC SURGERY

## 2018-10-15 PROCEDURE — 77030030163 HC BN WAX J&J -A: Performed by: ORTHOPAEDIC SURGERY

## 2018-10-15 PROCEDURE — 76010000153 HC OR TIME 1.5 TO 2 HR: Performed by: ORTHOPAEDIC SURGERY

## 2018-10-15 PROCEDURE — 77030037134 HC WRAP COMPR BACK THER SOLM -B: Performed by: ORTHOPAEDIC SURGERY

## 2018-10-15 PROCEDURE — 74011000250 HC RX REV CODE- 250: Performed by: ORTHOPAEDIC SURGERY

## 2018-10-15 PROCEDURE — 97161 PT EVAL LOW COMPLEX 20 MIN: CPT

## 2018-10-15 PROCEDURE — G8979 MOBILITY GOAL STATUS: HCPCS

## 2018-10-15 PROCEDURE — 74011250637 HC RX REV CODE- 250/637: Performed by: NURSE ANESTHETIST, CERTIFIED REGISTERED

## 2018-10-15 PROCEDURE — 77030013079 HC BLNKT BAIR HGGR 3M -A: Performed by: ANESTHESIOLOGY

## 2018-10-15 PROCEDURE — 82962 GLUCOSE BLOOD TEST: CPT

## 2018-10-15 PROCEDURE — 77030008683 HC TU ET CUF COVD -A: Performed by: ANESTHESIOLOGY

## 2018-10-15 PROCEDURE — G8978 MOBILITY CURRENT STATUS: HCPCS

## 2018-10-15 PROCEDURE — 97116 GAIT TRAINING THERAPY: CPT

## 2018-10-15 PROCEDURE — 76060000034 HC ANESTHESIA 1.5 TO 2 HR: Performed by: ORTHOPAEDIC SURGERY

## 2018-10-15 PROCEDURE — 77030012893: Performed by: ORTHOPAEDIC SURGERY

## 2018-10-15 PROCEDURE — 77030002933 HC SUT MCRYL J&J -A: Performed by: ORTHOPAEDIC SURGERY

## 2018-10-15 PROCEDURE — 77030019908 HC STETH ESOPH SIMS -A: Performed by: ANESTHESIOLOGY

## 2018-10-15 PROCEDURE — 77030027138 HC INCENT SPIROMETER -A

## 2018-10-15 PROCEDURE — 74011000250 HC RX REV CODE- 250

## 2018-10-15 PROCEDURE — 77030018836 HC SOL IRR NACL ICUM -A: Performed by: ORTHOPAEDIC SURGERY

## 2018-10-15 RX ORDER — DEXTROSE 50 % IN WATER (D50W) INTRAVENOUS SYRINGE
25-50 AS NEEDED
Status: DISCONTINUED | OUTPATIENT
Start: 2018-10-15 | End: 2018-10-15 | Stop reason: HOSPADM

## 2018-10-15 RX ORDER — PROPOFOL 10 MG/ML
INJECTION, EMULSION INTRAVENOUS AS NEEDED
Status: DISCONTINUED | OUTPATIENT
Start: 2018-10-15 | End: 2018-10-15 | Stop reason: HOSPADM

## 2018-10-15 RX ORDER — DIPHENHYDRAMINE HCL 25 MG
25 CAPSULE ORAL
Status: DISCONTINUED | OUTPATIENT
Start: 2018-10-15 | End: 2018-10-16 | Stop reason: HOSPADM

## 2018-10-15 RX ORDER — DIPHENHYDRAMINE HYDROCHLORIDE 50 MG/ML
12.5 INJECTION, SOLUTION INTRAMUSCULAR; INTRAVENOUS
Status: DISCONTINUED | OUTPATIENT
Start: 2018-10-15 | End: 2018-10-15 | Stop reason: HOSPADM

## 2018-10-15 RX ORDER — BUPIVACAINE HYDROCHLORIDE 5 MG/ML
INJECTION, SOLUTION EPIDURAL; INTRACAUDAL AS NEEDED
Status: DISCONTINUED | OUTPATIENT
Start: 2018-10-15 | End: 2018-10-15 | Stop reason: HOSPADM

## 2018-10-15 RX ORDER — SUCCINYLCHOLINE CHLORIDE 20 MG/ML
INJECTION INTRAMUSCULAR; INTRAVENOUS AS NEEDED
Status: DISCONTINUED | OUTPATIENT
Start: 2018-10-15 | End: 2018-10-15 | Stop reason: HOSPADM

## 2018-10-15 RX ORDER — INSULIN LISPRO 100 [IU]/ML
INJECTION, SOLUTION INTRAVENOUS; SUBCUTANEOUS ONCE
Status: DISCONTINUED | OUTPATIENT
Start: 2018-10-15 | End: 2018-10-15 | Stop reason: HOSPADM

## 2018-10-15 RX ORDER — FENTANYL CITRATE 50 UG/ML
INJECTION, SOLUTION INTRAMUSCULAR; INTRAVENOUS AS NEEDED
Status: DISCONTINUED | OUTPATIENT
Start: 2018-10-15 | End: 2018-10-15 | Stop reason: HOSPADM

## 2018-10-15 RX ORDER — MORPHINE SULFATE 2 MG/ML
2 INJECTION, SOLUTION INTRAMUSCULAR; INTRAVENOUS
Status: DISCONTINUED | OUTPATIENT
Start: 2018-10-15 | End: 2018-10-15 | Stop reason: HOSPADM

## 2018-10-15 RX ORDER — TRAMADOL HYDROCHLORIDE 50 MG/1
50 TABLET ORAL
Status: DISCONTINUED | OUTPATIENT
Start: 2018-10-15 | End: 2018-10-16 | Stop reason: HOSPADM

## 2018-10-15 RX ORDER — ONDANSETRON 2 MG/ML
4 INJECTION INTRAMUSCULAR; INTRAVENOUS ONCE
Status: DISCONTINUED | OUTPATIENT
Start: 2018-10-15 | End: 2018-10-15 | Stop reason: HOSPADM

## 2018-10-15 RX ORDER — SODIUM CHLORIDE, SODIUM LACTATE, POTASSIUM CHLORIDE, CALCIUM CHLORIDE 600; 310; 30; 20 MG/100ML; MG/100ML; MG/100ML; MG/100ML
25 INJECTION, SOLUTION INTRAVENOUS CONTINUOUS
Status: DISCONTINUED | OUTPATIENT
Start: 2018-10-15 | End: 2018-10-15 | Stop reason: HOSPADM

## 2018-10-15 RX ORDER — PHENYLEPHRINE HCL IN 0.9% NACL 1 MG/10 ML
SYRINGE (ML) INTRAVENOUS AS NEEDED
Status: DISCONTINUED | OUTPATIENT
Start: 2018-10-15 | End: 2018-10-15 | Stop reason: HOSPADM

## 2018-10-15 RX ORDER — DIPHENHYDRAMINE HYDROCHLORIDE 50 MG/ML
12.5 INJECTION, SOLUTION INTRAMUSCULAR; INTRAVENOUS
Status: DISCONTINUED | OUTPATIENT
Start: 2018-10-15 | End: 2018-10-16 | Stop reason: HOSPADM

## 2018-10-15 RX ORDER — SODIUM CHLORIDE 0.9 % (FLUSH) 0.9 %
5-10 SYRINGE (ML) INJECTION AS NEEDED
Status: DISCONTINUED | OUTPATIENT
Start: 2018-10-15 | End: 2018-10-15 | Stop reason: HOSPADM

## 2018-10-15 RX ORDER — ONDANSETRON 2 MG/ML
INJECTION INTRAMUSCULAR; INTRAVENOUS AS NEEDED
Status: DISCONTINUED | OUTPATIENT
Start: 2018-10-15 | End: 2018-10-15 | Stop reason: HOSPADM

## 2018-10-15 RX ORDER — LIDOCAINE HYDROCHLORIDE 20 MG/ML
INJECTION, SOLUTION EPIDURAL; INFILTRATION; INTRACAUDAL; PERINEURAL AS NEEDED
Status: DISCONTINUED | OUTPATIENT
Start: 2018-10-15 | End: 2018-10-15 | Stop reason: HOSPADM

## 2018-10-15 RX ORDER — ROCURONIUM BROMIDE 10 MG/ML
INJECTION, SOLUTION INTRAVENOUS AS NEEDED
Status: DISCONTINUED | OUTPATIENT
Start: 2018-10-15 | End: 2018-10-15 | Stop reason: HOSPADM

## 2018-10-15 RX ORDER — ACETAMINOPHEN 500 MG
1000 TABLET ORAL EVERY 6 HOURS
Status: DISCONTINUED | OUTPATIENT
Start: 2018-10-15 | End: 2018-10-16 | Stop reason: HOSPADM

## 2018-10-15 RX ORDER — MORPHINE SULFATE 10 MG/ML
INJECTION, SOLUTION INTRAMUSCULAR; INTRAVENOUS AS NEEDED
Status: DISCONTINUED | OUTPATIENT
Start: 2018-10-15 | End: 2018-10-15 | Stop reason: HOSPADM

## 2018-10-15 RX ORDER — DEXTROSE 40 %
1 GEL (GRAM) ORAL AS NEEDED
Status: DISCONTINUED | OUTPATIENT
Start: 2018-10-15 | End: 2018-10-15 | Stop reason: HOSPADM

## 2018-10-15 RX ORDER — GLIPIZIDE 2.5 MG/1
2.5 TABLET, EXTENDED RELEASE ORAL 2 TIMES DAILY
Status: DISCONTINUED | OUTPATIENT
Start: 2018-10-15 | End: 2018-10-16 | Stop reason: HOSPADM

## 2018-10-15 RX ORDER — LISINOPRIL 40 MG/1
40 TABLET ORAL DAILY
Status: DISCONTINUED | OUTPATIENT
Start: 2018-10-16 | End: 2018-10-16 | Stop reason: HOSPADM

## 2018-10-15 RX ORDER — FAMOTIDINE 20 MG/1
20 TABLET, FILM COATED ORAL ONCE
Status: COMPLETED | OUTPATIENT
Start: 2018-10-15 | End: 2018-10-15

## 2018-10-15 RX ORDER — ONDANSETRON 2 MG/ML
4 INJECTION INTRAMUSCULAR; INTRAVENOUS
Status: DISCONTINUED | OUTPATIENT
Start: 2018-10-15 | End: 2018-10-16 | Stop reason: HOSPADM

## 2018-10-15 RX ORDER — METOPROLOL TARTRATE 5 MG/5ML
INJECTION INTRAVENOUS AS NEEDED
Status: DISCONTINUED | OUTPATIENT
Start: 2018-10-15 | End: 2018-10-15 | Stop reason: HOSPADM

## 2018-10-15 RX ORDER — KETOROLAC TROMETHAMINE 30 MG/ML
INJECTION, SOLUTION INTRAMUSCULAR; INTRAVENOUS AS NEEDED
Status: DISCONTINUED | OUTPATIENT
Start: 2018-10-15 | End: 2018-10-15 | Stop reason: HOSPADM

## 2018-10-15 RX ORDER — SODIUM CHLORIDE 0.9 % (FLUSH) 0.9 %
5-10 SYRINGE (ML) INJECTION EVERY 8 HOURS
Status: DISCONTINUED | OUTPATIENT
Start: 2018-10-15 | End: 2018-10-15 | Stop reason: HOSPADM

## 2018-10-15 RX ORDER — SODIUM CHLORIDE, SODIUM LACTATE, POTASSIUM CHLORIDE, CALCIUM CHLORIDE 600; 310; 30; 20 MG/100ML; MG/100ML; MG/100ML; MG/100ML
50 INJECTION, SOLUTION INTRAVENOUS CONTINUOUS
Status: DISCONTINUED | OUTPATIENT
Start: 2018-10-15 | End: 2018-10-15 | Stop reason: HOSPADM

## 2018-10-15 RX ORDER — NEOSTIGMINE METHYLSULFATE 1 MG/ML
INJECTION INTRAVENOUS AS NEEDED
Status: DISCONTINUED | OUTPATIENT
Start: 2018-10-15 | End: 2018-10-15 | Stop reason: HOSPADM

## 2018-10-15 RX ORDER — LABETALOL HYDROCHLORIDE 5 MG/ML
INJECTION, SOLUTION INTRAVENOUS AS NEEDED
Status: DISCONTINUED | OUTPATIENT
Start: 2018-10-15 | End: 2018-10-15 | Stop reason: HOSPADM

## 2018-10-15 RX ORDER — TAMSULOSIN HYDROCHLORIDE 0.4 MG/1
0.4 CAPSULE ORAL DAILY
Status: DISCONTINUED | OUTPATIENT
Start: 2018-10-16 | End: 2018-10-16 | Stop reason: HOSPADM

## 2018-10-15 RX ORDER — MAGNESIUM SULFATE 100 %
4 CRYSTALS MISCELLANEOUS AS NEEDED
Status: DISCONTINUED | OUTPATIENT
Start: 2018-10-15 | End: 2018-10-15 | Stop reason: HOSPADM

## 2018-10-15 RX ORDER — OXYCODONE HYDROCHLORIDE 5 MG/1
5-10 TABLET ORAL
Status: DISCONTINUED | OUTPATIENT
Start: 2018-10-15 | End: 2018-10-16 | Stop reason: HOSPADM

## 2018-10-15 RX ORDER — METFORMIN HYDROCHLORIDE 500 MG/1
500 TABLET ORAL 2 TIMES DAILY WITH MEALS
Status: DISCONTINUED | OUTPATIENT
Start: 2018-10-15 | End: 2018-10-16 | Stop reason: HOSPADM

## 2018-10-15 RX ORDER — AMLODIPINE BESYLATE 5 MG/1
5 TABLET ORAL DAILY
Status: DISCONTINUED | OUTPATIENT
Start: 2018-10-16 | End: 2018-10-16 | Stop reason: HOSPADM

## 2018-10-15 RX ORDER — NALOXONE HYDROCHLORIDE 0.4 MG/ML
0.4 INJECTION, SOLUTION INTRAMUSCULAR; INTRAVENOUS; SUBCUTANEOUS AS NEEDED
Status: DISCONTINUED | OUTPATIENT
Start: 2018-10-15 | End: 2018-10-16 | Stop reason: HOSPADM

## 2018-10-15 RX ORDER — LORAZEPAM 2 MG/ML
1 INJECTION INTRAMUSCULAR
Status: DISCONTINUED | OUTPATIENT
Start: 2018-10-15 | End: 2018-10-16 | Stop reason: HOSPADM

## 2018-10-15 RX ORDER — GLYCOPYRROLATE 0.2 MG/ML
INJECTION INTRAMUSCULAR; INTRAVENOUS AS NEEDED
Status: DISCONTINUED | OUTPATIENT
Start: 2018-10-15 | End: 2018-10-15 | Stop reason: HOSPADM

## 2018-10-15 RX ORDER — CEFAZOLIN SODIUM 2 G/50ML
2 SOLUTION INTRAVENOUS EVERY 8 HOURS
Status: DISCONTINUED | OUTPATIENT
Start: 2018-10-15 | End: 2018-10-16 | Stop reason: HOSPADM

## 2018-10-15 RX ORDER — HYDROMORPHONE HYDROCHLORIDE 1 MG/ML
1 INJECTION, SOLUTION INTRAMUSCULAR; INTRAVENOUS; SUBCUTANEOUS
Status: DISCONTINUED | OUTPATIENT
Start: 2018-10-15 | End: 2018-10-16 | Stop reason: HOSPADM

## 2018-10-15 RX ORDER — SODIUM CHLORIDE 0.9 % (FLUSH) 0.9 %
5-10 SYRINGE (ML) INJECTION AS NEEDED
Status: DISCONTINUED | OUTPATIENT
Start: 2018-10-15 | End: 2018-10-16 | Stop reason: HOSPADM

## 2018-10-15 RX ORDER — DEXAMETHASONE SODIUM PHOSPHATE 4 MG/ML
INJECTION, SOLUTION INTRA-ARTICULAR; INTRALESIONAL; INTRAMUSCULAR; INTRAVENOUS; SOFT TISSUE AS NEEDED
Status: DISCONTINUED | OUTPATIENT
Start: 2018-10-15 | End: 2018-10-15 | Stop reason: HOSPADM

## 2018-10-15 RX ORDER — CEFAZOLIN SODIUM 2 G/50ML
2 SOLUTION INTRAVENOUS ONCE
Status: COMPLETED | OUTPATIENT
Start: 2018-10-15 | End: 2018-10-15

## 2018-10-15 RX ORDER — DIAZEPAM 5 MG/1
5 TABLET ORAL
Status: DISCONTINUED | OUTPATIENT
Start: 2018-10-15 | End: 2018-10-16 | Stop reason: HOSPADM

## 2018-10-15 RX ORDER — NALOXONE HYDROCHLORIDE 0.4 MG/ML
0.04 INJECTION, SOLUTION INTRAMUSCULAR; INTRAVENOUS; SUBCUTANEOUS AS NEEDED
Status: DISCONTINUED | OUTPATIENT
Start: 2018-10-15 | End: 2018-10-15 | Stop reason: HOSPADM

## 2018-10-15 RX ORDER — ALBUTEROL SULFATE 0.83 MG/ML
2.5 SOLUTION RESPIRATORY (INHALATION) AS NEEDED
Status: DISCONTINUED | OUTPATIENT
Start: 2018-10-15 | End: 2018-10-15 | Stop reason: HOSPADM

## 2018-10-15 RX ORDER — PANTOPRAZOLE SODIUM 40 MG/1
40 TABLET, DELAYED RELEASE ORAL
Status: DISCONTINUED | OUTPATIENT
Start: 2018-10-16 | End: 2018-10-16 | Stop reason: HOSPADM

## 2018-10-15 RX ORDER — DULOXETIN HYDROCHLORIDE 20 MG/1
20 CAPSULE, DELAYED RELEASE ORAL DAILY
Status: DISCONTINUED | OUTPATIENT
Start: 2018-10-16 | End: 2018-10-16 | Stop reason: HOSPADM

## 2018-10-15 RX ORDER — SODIUM CHLORIDE 0.9 % (FLUSH) 0.9 %
5-10 SYRINGE (ML) INJECTION EVERY 8 HOURS
Status: DISCONTINUED | OUTPATIENT
Start: 2018-10-15 | End: 2018-10-16 | Stop reason: HOSPADM

## 2018-10-15 RX ORDER — VANCOMYCIN HYDROCHLORIDE 1 G/20ML
INJECTION, POWDER, LYOPHILIZED, FOR SOLUTION INTRAVENOUS AS NEEDED
Status: DISCONTINUED | OUTPATIENT
Start: 2018-10-15 | End: 2018-10-15 | Stop reason: HOSPADM

## 2018-10-15 RX ADMIN — CEFAZOLIN SODIUM 2 G: 2 SOLUTION INTRAVENOUS at 11:04

## 2018-10-15 RX ADMIN — LABETALOL HYDROCHLORIDE 5 MG: 5 INJECTION, SOLUTION INTRAVENOUS at 12:37

## 2018-10-15 RX ADMIN — OXYCODONE HYDROCHLORIDE 10 MG: 5 TABLET ORAL at 18:14

## 2018-10-15 RX ADMIN — METFORMIN HYDROCHLORIDE 500 MG: 500 TABLET ORAL at 18:15

## 2018-10-15 RX ADMIN — KETOROLAC TROMETHAMINE 15 MG: 30 INJECTION, SOLUTION INTRAMUSCULAR; INTRAVENOUS at 12:45

## 2018-10-15 RX ADMIN — Medication 100 MCG: at 12:10

## 2018-10-15 RX ADMIN — FENTANYL CITRATE 100 MCG: 50 INJECTION, SOLUTION INTRAMUSCULAR; INTRAVENOUS at 11:10

## 2018-10-15 RX ADMIN — ONDANSETRON 4 MG: 2 INJECTION INTRAMUSCULAR; INTRAVENOUS at 11:04

## 2018-10-15 RX ADMIN — CEFAZOLIN SODIUM 2 G: 2 SOLUTION INTRAVENOUS at 18:15

## 2018-10-15 RX ADMIN — ROCURONIUM BROMIDE 5 MG: 10 INJECTION, SOLUTION INTRAVENOUS at 11:10

## 2018-10-15 RX ADMIN — GLIPIZIDE 2.5 MG: 2.5 TABLET, FILM COATED, EXTENDED RELEASE ORAL at 18:15

## 2018-10-15 RX ADMIN — MORPHINE SULFATE 3 MG: 10 INJECTION, SOLUTION INTRAMUSCULAR; INTRAVENOUS at 12:52

## 2018-10-15 RX ADMIN — SODIUM CHLORIDE, SODIUM LACTATE, POTASSIUM CHLORIDE, AND CALCIUM CHLORIDE: 600; 310; 30; 20 INJECTION, SOLUTION INTRAVENOUS at 12:19

## 2018-10-15 RX ADMIN — GLYCOPYRROLATE 0.2 MG: 0.2 INJECTION INTRAMUSCULAR; INTRAVENOUS at 11:04

## 2018-10-15 RX ADMIN — SODIUM CHLORIDE, SODIUM LACTATE, POTASSIUM CHLORIDE, AND CALCIUM CHLORIDE 25 ML/HR: 600; 310; 30; 20 INJECTION, SOLUTION INTRAVENOUS at 10:39

## 2018-10-15 RX ADMIN — FENTANYL CITRATE 50 MCG: 50 INJECTION, SOLUTION INTRAMUSCULAR; INTRAVENOUS at 11:20

## 2018-10-15 RX ADMIN — PROPOFOL 120 MG: 10 INJECTION, EMULSION INTRAVENOUS at 11:10

## 2018-10-15 RX ADMIN — SUCCINYLCHOLINE CHLORIDE 140 MG: 20 INJECTION INTRAMUSCULAR; INTRAVENOUS at 11:10

## 2018-10-15 RX ADMIN — ROCURONIUM BROMIDE 25 MG: 10 INJECTION, SOLUTION INTRAVENOUS at 11:19

## 2018-10-15 RX ADMIN — DEXAMETHASONE SODIUM PHOSPHATE 4 MG: 4 INJECTION, SOLUTION INTRA-ARTICULAR; INTRALESIONAL; INTRAMUSCULAR; INTRAVENOUS; SOFT TISSUE at 11:10

## 2018-10-15 RX ADMIN — LIDOCAINE HYDROCHLORIDE 60 MG: 20 INJECTION, SOLUTION EPIDURAL; INFILTRATION; INTRACAUDAL; PERINEURAL at 11:10

## 2018-10-15 RX ADMIN — Medication 10 ML: at 10:47

## 2018-10-15 RX ADMIN — Medication 100 MCG: at 11:55

## 2018-10-15 RX ADMIN — FENTANYL CITRATE 50 MCG: 50 INJECTION, SOLUTION INTRAMUSCULAR; INTRAVENOUS at 12:27

## 2018-10-15 RX ADMIN — ACETAMINOPHEN 1000 MG: 500 TABLET, FILM COATED ORAL at 18:15

## 2018-10-15 RX ADMIN — ACETAMINOPHEN 1000 MG: 500 TABLET, FILM COATED ORAL at 23:23

## 2018-10-15 RX ADMIN — OXYCODONE HYDROCHLORIDE 10 MG: 5 TABLET ORAL at 22:26

## 2018-10-15 RX ADMIN — FENTANYL CITRATE 50 MCG: 50 INJECTION, SOLUTION INTRAMUSCULAR; INTRAVENOUS at 11:30

## 2018-10-15 RX ADMIN — GLYCOPYRROLATE 0.6 MG: 0.2 INJECTION INTRAMUSCULAR; INTRAVENOUS at 12:48

## 2018-10-15 RX ADMIN — NEOSTIGMINE METHYLSULFATE 4 MG: 1 INJECTION INTRAVENOUS at 12:48

## 2018-10-15 RX ADMIN — LABETALOL HYDROCHLORIDE 5 MG: 5 INJECTION, SOLUTION INTRAVENOUS at 12:22

## 2018-10-15 RX ADMIN — SODIUM CHLORIDE, SODIUM LACTATE, POTASSIUM CHLORIDE, AND CALCIUM CHLORIDE: 600; 310; 30; 20 INJECTION, SOLUTION INTRAVENOUS at 11:04

## 2018-10-15 RX ADMIN — METOPROLOL TARTRATE 2 MG: 5 INJECTION INTRAVENOUS at 11:22

## 2018-10-15 RX ADMIN — FAMOTIDINE 20 MG: 20 TABLET ORAL at 10:47

## 2018-10-15 NOTE — PROGRESS NOTES
Discharge planning: 
 
Patient and family elected to have [facility] provide the in-home services. Referral to [facility] was made on behalf of the patient. Jalil Adamson. [facility] Care Manager [phone]

## 2018-10-15 NOTE — INTERVAL H&P NOTE
H&P Update: 
Eloisa Mccracken was seen and examined. History and physical has been reviewed. The patient has been examined.  There have been no significant clinical changes since the completion of the originally dated History and Physical. 
 
Signed By: Mara Phan MD   
 October 15, 2018 6:53 AM

## 2018-10-15 NOTE — BRIEF OP NOTE
BRIEF OPERATIVE NOTE Date of Procedure: 10/15/2018 Preoperative Diagnosis: HNP (herniated nucleus pulposus), lumbar [M51.26] Postoperative Diagnosis: HNP (herniated nucleus pulposus), lumbar [M51.26] Procedure(s): RIGHT L3/4 LAMINECTOMY DISCECTOMY/C-ARM Surgeon(s) and Role: Livia Paz MD - Primary Surgical Assistant: 0 Surgical Staff: 
Circ-1: Adan Lam RN Radiology Technician: Tony Heck Scrub Tech-1[facility] S[facility] Asst-1: Merly Mass Event Time In Incision Start [id number] Incision Close 1240 Anesthesia: General  
Estimated Blood Loss: 5 Specimens: * No specimens in log * Findings: stenosis, hnp Complications: 0 Implants: * No implants in log *

## 2018-10-15 NOTE — NURSE NAVIGATOR
Reason for Admission:   Back surgery RRAT Score:       8 Plan for utilizing home health:      List of available agencies left with wife Likelihood of Readmission:  low Transition of Care Plan: This patient lives in a 1 story home with his spouse. There are 3-4 steps at the entry. He was independent in his self-care/ADL's prior to admission. His spouse will be available to transport patient home upon discharge. They have a cane, walker, wheelchair, bedside commode already in the home. List of available home health  agencies left at bedside with the patient for review prior to discharge. Care Management Interventions PCP Verified by CM: Yes Mode of Transport at Discharge: Self Transition of Care Consult (CM Consult): Home Health Physical Therapy Consult: Yes Occupational Therapy Consult: Yes Speech Therapy Consult: No 
Current Support Network: Lives with Spouse, Own Home Confirm Follow Up Transport: Family Plan discussed with Pt/Family/Caregiver: Yes 
 
 
 
ANGELO Langford, RN Care Management

## 2018-10-15 NOTE — HOME CARE
[facility] received home care referral for PT - liaison nurses will continue to follow for home care needs thru discharge - TERE Barroso LPN

## 2018-10-15 NOTE — ANESTHESIA POSTPROCEDURE EVALUATION
Post-Anesthesia Evaluation and Assessment Patient: Yassine Frey MRN: [id number]  SSN: [id number] YOB: 1932  Age: 80 y.o. Sex: male Cardiovascular Function/Vital Signs Visit Vitals  /68  Pulse 95  Temp 36.5 °C (97.7 °F)  Resp 18  Ht 5' 6.5\" (1.689 m)  Wt 78.5 kg (173 lb)  SpO2 100%  BMI 27.5 kg/m2 Patient is status post general anesthesia for Procedure(s): RIGHT L3/4 LAMINECTOMY DISCECTOMY/C-ARM. Nausea/Vomiting: None Postoperative hydration reviewed and adequate. Pain: 
Pain Scale 1: Numeric (0 - 10) (10/15/18 1036) Pain Intensity 1: 9 (10/15/18 1036) Managed Neurological Status:  
Neuro (WDL): Within Defined Limits (10/15/18 1035) At baseline Mental Status and Level of Consciousness: Arousable Pulmonary Status:  
O2 Device: Oxygen mask (10/15/18 1257) Adequate oxygenation and airway patent Complications related to anesthesia: None Post-anesthesia assessment completed. No concerns Signed By: Roberto Angela MD   
 October 15, 2018

## 2018-10-15 NOTE — PROGRESS NOTES
OR today  Right L3/4 laminectomy discectomy VSS States right leg pain resolved Up in chair Incisional pain controlled BLE 4/5 and equal 
Neuro intact Dressing dry and intact Urine output is 500 cc Jatinder Bellamy, NP

## 2018-10-15 NOTE — ROUTINE PROCESS
Moderate bleeding under honeycomb dressing, pressure dressing from 4x4s and ABD placed to reinforce dressing, call placed to Dr. Sunny Torrez . .. Yara Brooks8

## 2018-10-15 NOTE — ANESTHESIA PREPROCEDURE EVALUATION
Anesthetic History PONV Review of Systems / Medical History Patient summary reviewed, nursing notes reviewed and pertinent labs reviewed Pulmonary Within defined limits Neuro/Psych Within defined limits Cardiovascular Hypertension: poorly controlled GI/Hepatic/Renal 
Within defined limits Endo/Other Diabetes: well controlled, type 2 Arthritis Other Findings Physical Exam 
 
Airway Mallampati: III 
TM Distance: 4 - 6 cm Neck ROM: normal range of motion Mouth opening: Normal 
 
 Cardiovascular Regular rate and rhythm,  S1 and S2 normal,  no murmur, click, rub, or gallop Rhythm: regular Rate: normal 
 
 
 
 Dental 
 
Dentition: Edentulous Pulmonary Breath sounds clear to auscultation Abdominal 
GI exam deferred Other Findings Anesthetic Plan ASA: 3 Anesthesia type: general 
 
 
 
 
Induction: Intravenous Anesthetic plan and risks discussed with: Patient

## 2018-10-15 NOTE — IP AVS SNAPSHOT
[address]  Patient: Ed Sallie MRN: [id number] TINA:[id number] About your hospitalization You were admitted on:  October 15, 2018 You last received care in the:  [facility] 5 [facility] 1980 You were discharged on:  October 16, 2018 Why you were hospitalized Your primary diagnosis was:  Not on File Your diagnoses also included:  Lumbar Disc Herniation Follow-up Information Follow up With Details Comments Contact Info Diomedes Walters MD On 10/19/2018 Appointment at 1:30pm [facility] 100 200 [facility] 
[phone] Yareli Agustin MD On 10/31/2018 Appointment at 11:00am with French Ellis NP [facility] [address] [facility] [address] [phone] [address] [place] [address] Call in 1 day Home health follow-up. Please call home health company if you have not heard from them by 12 noon, day after discharge. [facility] [address] [facility] [address] 
[phone] Your Scheduled Appointments Wednesday October 31, 2018 11:10 AM EDT  
POST OP with Julian Chávez NP  
[facility] ([address] [place] [address]) [address] [place] [address] 200 [facility]  
[phone] Tuesday November 27, 2018  9:45 AM EST  
POST OP with Yareli Agustin MD  
4 [facility], [address] and [facility] [address] [place] [address]) . [facility] [address] [facility] [address] [phone] Discharge Orders None A check haily indicates which time of day the medication should be taken. My Medications CHANGE how you take these medications Instructions Each Dose to Equal  
 Morning Noon Evening Bedtime  
 dutasteride 0.5 mg capsule Commonly known as:  AVODART What changed:  when to take this Your last dose was: Your next dose is: Take 1 Cap by mouth daily. 0.5 mg  
    
   
   
   
  
 * traMADol 50 mg tablet Commonly known as:  ULTRAM  
What changed:  Another medication with the same name was added. Make sure you understand how and when to take each. Your last dose was: Your next dose is: Take 1 Tab by mouth every four (4) hours as needed. Max Daily Amount: 300 mg.  
 50 mg  
    
   
   
   
  
 * traMADol 50 mg tablet Commonly known as:  ULTRAM  
What changed: You were already taking a medication with the same name, and this prescription was added. Make sure you understand how and when to take each. Your last dose was: Your next dose is: Take 1 Tab by mouth every six (6) hours as needed for Pain. Max Daily Amount: 200 mg.  
 50 mg  
    
   
   
   
  
 * Notice: This list has 2 medication(s) that are the same as other medications prescribed for you. Read the directions carefully, and ask your doctor or other care provider to review them with you. CONTINUE taking these medications Instructions Each Dose to Equal  
 Morning Noon Evening Bedtime  
 amLODIPine 5 mg tablet Commonly known as:  Alexander Sabillon Your last dose was: Your next dose is: Take 5 mg by mouth daily. 5 mg  
    
   
   
   
  
 aspirin delayed-release 81 mg tablet Your last dose was: Your next dose is: Take  by mouth daily. CINNAMON 500 mg Cap Generic drug:  cinnamon bark Your last dose was: Your next dose is: Take  by mouth two (2) times a day. DAILY MULTI-VITAMINS/IRON tablet Generic drug:  multivitamin with iron Your last dose was: Your next dose is: Take 1 tablet by mouth daily. 1 Tab  
    
   
   
   
  
 diclofenac 1 % Gel Commonly known as:  VOLTAREN Your last dose was: Your next dose is: DULoxetine 20 mg capsule Commonly known as:  CYMBALTA Your last dose was: Your next dose is: Take 1 Cap by mouth daily. Indications: NEUROPATHIC PAIN  
 20 mg  
    
   
   
   
  
 glipiZIDE SR 2.5 mg CR tablet Commonly known as:  GLUCOTROL XL Your last dose was: Your next dose is: Take  by mouth two (2) times a day. lisinopril 40 mg tablet Commonly known as:  Bonnee Collie Your last dose was: Your next dose is: Take 40 mg by mouth daily. 40 mg  
    
   
   
   
  
 metFORMIN 500 mg tablet Commonly known as:  GLUCOPHAGE Your last dose was: Your next dose is: Take  by mouth two (2) times daily (with meals). NexIUM 20 mg capsule Generic drug:  esomeprazole Your last dose was: Your next dose is: Take  by mouth daily. saw palmetto 500 mg Cap Your last dose was: Your next dose is: Take  by mouth. Indications: 2 at noon and 2 at dinner  
     
   
   
   
  
 vitamin E acetate 400 unit Cap capsule Your last dose was: Your next dose is: Take  by mouth daily. Where to Get Your Medications Information on where to get these meds will be given to you by the nurse or doctor. ! Ask your nurse or doctor about these medications  
  traMADol 50 mg tablet Opioid Education Prescription Opioids: What You Need to Know: 
 
Prescription opioids can be used to help relieve moderate-to-severe pain and are often prescribed following a surgery or injury, or for certain health conditions. These medications can be an important part of treatment but also come with serious risks. Opioids are strong pain medicines.  Examples include hydrocodone, oxycodone, fentanyl, and morphine. Heroin is an example of an illegal opioid. It is important to work with your health care provider to make sure you are getting the safest, most effective care. WHAT ARE THE RISKS AND SIDE EFFECTS OF OPIOID USE? Prescription opioids carry serious risks of addiction and overdose, especially with prolonged use. An opioid overdose, often marked by slow breathing, can cause sudden death. The use of prescription opioids can have a number of side effects as well, even when taken as directed. · Tolerance-meaning you might need to take more of a medication for the same pain relief · Physical dependence-meaning you have symptoms of withdrawal when the medication is stopped. Withdrawal symptoms can include nausea, sweating, chills, diarrhea, stomach cramps, and muscle aches. Withdrawal can last up to several weeks, depending on which drug you took and how long you took it. · Increased sensitivity to pain · Constipation · Nausea, vomiting, and dry mouth · Sleepiness and dizziness · Confusion · Depression · Low levels of testosterone that can result in lower sex drive, energy, and strength · Itching and sweating RISKS ARE GREATER WITH:      
· History of drug misuse, substance use disorder, or overdose · Mental health conditions (such as depression or anxiety) · Sleep apnea · Older age (72 years or older) · Pregnancy Avoid alcohol while taking prescription opioids. Also, unless specifically advised by your health care provider, medications to avoid include: · Benzodiazepines (such as Xanax or Valium) · Muscle relaxants (such as Soma or Flexeril) · Hypnotics (such as Ambien or Lunesta) · Other prescription opioids KNOW YOUR OPTIONS Talk to your health care provider about ways to manage your pain that don't involve prescription opioids. Some of these options may actually work better and have fewer risks and side effects.  Consult your physician before adding or stopping any medications, treatments, or physical activity. Options may include: 
· Pain relievers such as acetaminophen, ibuprofen, and naproxen · Some medications that are also used for depression or seizures · Physical therapy and exercise · Counseling to help patients learn how to cope better with triggers of pain and stress. · Application of heat or cold compress · Massage therapy · Relaxation techniques Be Informed Make sure you know the name of your medication, how much and how often to take it, and its potential risks & side effects. IF YOU ARE PRESCRIBED OPIOIDS FOR PAIN: 
· Never take opioids in greater amounts or more often than prescribed. Remember the goal is not to be pain-free but to manage your pain at a tolerable level. · Follow up with your primary care provider to: · Work together to create a plan on how to manage your pain. · Talk about ways to help manage your pain that don't involve prescription opioids. · Talk about any and all concerns and side effects. · Help prevent misuse and abuse. · Never sell or share prescription opioids · Help prevent misuse and abuse. · Store prescription opioids in a secure place and out of reach of others (this may include visitors, children, friends, and family). · Safely dispose of unused/unwanted prescription opioids: Find your community drug take-back program or your pharmacy mail-back program, or flush them down the toilet, following guidance from the Food and Drug Administration (www.fda.gov/Drugs/ResourcesForYou). · Visit www.cdc.gov/drugoverdose to learn about the risks of opioid abuse and overdose. · If you believe you may be struggling with addiction, tell your health care provider and ask for guidance or call [address] [place] [address] at [phone]. Discharge Instructions Lumbar Laminectomy: What to Expect at [facility] Your Recovery You can expect your back to feel stiff or sore after surgery. This should improve in the weeks after surgery. You may have trouble sitting or standing in one position for very long and may need pain medicine in the weeks after your surgery. Your doctor may advise you to work with a physical therapist to strengthen the muscles around your spine and trunk. You will need to learn how to lift, twist, and bend so that you do not put too much strain on your back. This care sheet gives you a general idea about how long it will take for you to recover. But each person recovers at a different pace. Follow the steps below to get better as quickly as possible. How can you care for yourself at home? Activity 
  · Rest when you feel tired. Getting enough sleep will help you recover.  
  · Try to walk each day. Start by walking a little more than you did the day before. Bit by bit, increase the amount you walk. Walking boosts blood flow and helps prevent pneumonia and constipation. Walking may also decrease your muscle soreness after surgery.  
  · If advised by your doctor, you may need to avoid lifting anything that would cause excessive strain on your back. This may include a child, heavy grocery bags and milk containers, a heavy briefcase or backpack, cat litter or dog food bags, or a vacuum .  
  · Avoid strenuous activities, such as bicycle riding, jogging, weight lifting, or aerobic exercise, until your doctor says it is okay.  
  · Do not drive for 2 to 4 weeks after your surgery or until your doctor says it is okay.  
  · Avoid riding in a car for more than 30 minutes at a time for 2 to 4 weeks after surgery. If you must ride in a car for a longer distance, stop often to walk and stretch your legs.  
  · Try to change your position about every 30 minutes while sitting or standing. This will help decrease your back pain while you are healing.   · You will probably need to take 4 to 6 weeks off from work. It depends on the type of work you do and how you feel.  
  · You may have sex as soon as you feel able, but avoid positions that put stress on your back or cause pain. Diet 
  · You can eat your normal diet. If your stomach is upset, try bland, low-fat foods like plain rice, broiled chicken, toast, and yogurt.  
  · Drink plenty of fluids (unless your doctor tells you not to).  
  · You may notice that your bowel movements are not regular right after your surgery. This is common. Try to avoid constipation and straining with bowel movements. You may want to take a fiber supplement every day. If you have not had a bowel movement after a couple of days, ask your doctor about taking a mild laxative. Medicines 
  · Your doctor will tell you if and when you can restart your medicines. He or she will also give you instructions about taking any new medicines.  
  · If you take blood thinners, such as warfarin (Coumadin), clopidogrel (Plavix), or aspirin, be sure to talk to your doctor. He or she will tell you if and when to start taking those medicines again. Make sure that you understand exactly what your doctor wants you to do.  
  · Take pain medicines exactly as directed. ¨ If the doctor gave you a prescription medicine for pain, take it as prescribed. ¨ If you are not taking a prescription pain medicine, ask your doctor if you can take an over-the-counter medicine.  
  · If your doctor prescribed antibiotics, take them as directed. Do not stop taking them just because you feel better. You need to take the full course of antibiotics.  
  · If you think your pain medicine is making you sick to your stomach: 
¨ Take your medicine after meals (unless your doctor has told you not to). ¨ Ask your doctor for a different pain medicine.   
Incision care 
  · If you have strips of tape on the cut (incision) the doctor made, leave the tape on for a week or until it falls off.  
  · Wash the area daily with warm, soapy water and pat it dry.  
  · Keep the area clean and dry. You may cover it with a gauze bandage if it weeps or rubs against clothing. Change the bandage every day. Exercise 
  · Do back exercises as instructed by your doctor.  
  · Your doctor may advise you to work with a physical therapist to improve the strength and flexibility of your back. Other instructions 
  · To reduce stiffness and help sore muscles, use a warm water bottle, a heating pad set on low, or a warm cloth on your back. Do not put heat right over the incision. Do not go to sleep with a heating pad on your skin. Follow-up care is a key part of your treatment and safety. Be sure to make and go to all appointments, and call your doctor if you are having problems. It's also a good idea to know your test results and keep a list of the medicines you take. When should you call for help? Call 911 anytime you think you may need emergency care. For example, call if: 
  · You passed out (lost consciousness).  
  · You have sudden chest pain and shortness of breath, or you cough up blood.  
  · You are unable to move a leg at all.  
[facility] your doctor now or seek immediate medical care if: 
  · You have new or worse symptoms in your legs or buttocks. Symptoms may include: ¨ Numbness or tingling. ¨ Weakness. ¨ Pain.  
  · You lose bladder or bowel control.  
  · You have loose stitches, or your incision comes open.  
  · You have blood or fluid draining from the incision.  
  · You have signs of infection, such as: 
¨ Increased pain, swelling, warmth, or redness. ¨ Pus draining from the incision. ¨ A fever. ¨ Red streaks leading from the incision.  
 Watch closely for changes in your health, and be sure to contact your doctor if: 
  · You do not have a bowel movement after taking a laxative.  
  · You are not getting better as expected. Where can you learn more? Go to http://prerna-alen.info/. Enter D489 in the search box to learn more about \"Lumbar Laminectomy: What to Expect at Home. \" Current as of: 2017 Content Version: 11.8 © 6893-7351 [employer]. Care instructions adapted under license by [employer] (which disclaims liability or warranty for this information). If you have questions about a medical condition or this instruction, always ask your healthcare professional. [address] any warranty or liability for your use of this information. Patient armband removed and shredded MyChart Activation Thank you for requesting access to [employer]. Please follow the instructions below to securely access and download your online medical record. [employer]us allows you to send messages to your doctor, view your test results, renew your prescriptions, schedule appointments, and more. How Do I Sign Up? 1. In your internet browser, go to [email or web address] 
2. Click on the First Time User? Click Here link in the Sign In box. You will be redirect to the New Member Sign Up page. 3. Enter your St[employer]us Access Code exactly as it appears below. You will not need to use this code after youve completed the sign-up process. If you do not sign up before the expiration date, you must request a new code. [employer] Access Code: [email or web address] Expires: 2018  3:51 PM (This is the date your [employer]us access code will ) 4. Enter the last four digits of your Social Security Number (xxxx) and Date of Birth (mm/dd/yyyy) as indicated and click Submit. You will be taken to the next sign-up page. 5. Create a Strevus ID. This will be your St[employer]us login ID and cannot be changed, so think of one that is secure and easy to remember. 6. Create a Strevus password. You can change your password at any time. 7. Enter your Password Reset Question and Answer. This can be used at a later time if you forget your password. 8. Enter your e-mail address. You will receive e-mail notification when new information is available in [address] 9. Click Sign Up. You can now view and download portions of your medical record. 10. Click the Download Summary menu link to download a portable copy of your medical information. Additional Information If you have questions, please visit the Frequently Asked Questions section of the 27 b[employer] website at [email or web address] Remember, [employer] is NOT to be used for urgent needs. For medical emergencies, dial 911. DISCHARGE SUMMARY from Nurse PATIENT INSTRUCTIONS: 
 
 
F-face looks uneven A-arms unable to move or move unevenly S-speech slurred or non-existent T-time-call 911 as soon as signs and symptoms begin-DO NOT go Back to bed or wait to see if you get better-TIME IS BRAIN. Warning Signs of HEART ATTACK Call 911 if you have these symptoms: 
? Chest discomfort. Most heart attacks involve discomfort in the center of the chest that lasts more than a few minutes, or that goes away and comes back. It can feel like uncomfortable pressure, squeezing, fullness, or pain. ? Discomfort in other areas of the upper body. Symptoms can include pain or discomfort in one or both arms, the back, neck, jaw, or stomach. ? Shortness of breath with or without chest discomfort. ? Other signs may include breaking out in a cold sweat, nausea, or lightheadedness. Don't wait more than five minutes to call [address]! Fast action can save your life.  Calling 911 is almost always the fastest way to get lifesaving treatment. Emergency Medical Services staff can begin treatment when they arrive  up to an hour sooner than if someone gets to the hospital by car. The discharge information has been reviewed with the patient. The patient verbalized understanding. Discharge medications reviewed with the patient and appropriate educational materials and side effects teaching were provided. ___________________________________________________________________________________________________________________________________ Introducing [facility]! Zohra Bishnu introduces [employer] patient portal. Now you can access parts of your medical record, email your doctor's office, and request medication refills online. 1. In your internet browser, go to [email or web address] 2. Click on the First Time User? Click Here link in the Sign In box. You will see the New Member Sign Up page. 3. Enter your [employer] Access Code exactly as it appears below. You will not need to use this code after youve completed the sign-up process. If you do not sign up before the expiration date, you must request a new code. · [employer] Access Code: [email or web address] Expires: 11/26/2018  3:51 PM 
 
4. Enter the last four digits of your Social Security Number (xxxx) and Date of Birth (mm/dd/yyyy) as indicated and click Submit. You will be taken to the next sign-up page. 5. Create a Zarfot ID. This will be your [employer]Decisions login ID and cannot be changed, so think of one that is secure and easy to remember. 6. Create a Zarfot password. You can change your password at any time. 7. Enter your Password Reset Question and Answer. This can be used at a later time if you forget your password. 8. Enter your e-mail address. You will receive e-mail notification when new information is available in [address] 9. Click Sign Up. You can now view and download portions of your medical record. 10. Click the Download Summary menu link to download a portable copy of your medical information. If you have questions, please visit the Frequently Asked Questions section of the [employer] website. Remember, [employer] is NOT to be used for urgent needs. For medical emergencies, dial 911. Now available from your iPhone and Android! Introducing Ryan Fang As a [facility] patient, I wanted to make you aware of our electronic visit tool called Ryan Fang. Sendori[employer] allows you to connect within minutes with a medical provider 24 hours a day, seven days a week via a mobile device or tablet or logging into a secure website from your computer. You can access Ryan Fang from anywhere in the [place]. A virtual visit might be right for you when you have a simple condition and feel like you just dont want to get out of bed, or cant get away from work for an appointment, when your regular [facility] provider is not available (evenings, weekends or holidays), or when youre out of town and need minor care. Electronic visits cost only $49 and if the LeeLoved.la/[employer] provider determines a prescription is needed to treat your condition, one can be electronically transmitted to a nearby pharmacy*. Please take a moment to enroll today if you have not already done so. The enrollment process is free and takes just a few minutes. To enroll, please download the LilyMedia danette to your tablet or phone, or visit [email or web address] to enroll on your computer. And, as an [address] [place] [address] patient with a [employer] account, the results of your visits will be scanned into your electronic medical record and your primary care provider will be able to view the scanned results. We urge you to continue to see your regular [facility] provider for your ongoing medical care.   And while your primary care provider may not be the one available when you seek a Ryan Hairniyahfin virtual visit, the peace of mind you get from getting a real diagnosis real time can be priceless. For more information on Ryan Hairniyahfin, view our Frequently Asked Questions (FAQs) at [email or web address]. Sincerely, 
 
Anil Guevara MD 
Chief Medical Officer 50[address] Pitts *:  certain medications cannot be prescribed via Ryan Hairshameka Unresulted Labs-Please follow up with your PCP about these lab tests Order Current Status NC XR TECHNOLOGIST SERVICE In process Providers Seen During Your Hospitalization Provider Specialty Primary office phone Juan Hagan MD Orthopedic Surgery [phone] Your Primary Care Physician (PCP) Primary Care Physician Office Phone Office Fax Ravinder Vázquez [phone] You are allergic to the following Allergen Reactions Sulfa (Sulfonamide Antibiotics) Other (comments) Patient states not allergic Recent Documentation Height Weight BMI Smoking Status 1.689 m 78.5 kg 27.5 kg/m2 Former Smoker Emergency Contacts Name Discharge Info Relation Home Work Mobile Constance Yun DISCHARGE CAREGIVER [3] Spouse [3] [phone] Patient Belongings The following personal items are in your possession at time of discharge: 
  Dental Appliances: None  Visual Aid: None   Hearing Aids/Status: Bilateral  Home Medications: None   Jewelry: None  Clothing: None    Other Valuables: None  Personal Items Sent to Safe: n/a Please provide this summary of care documentation to your next provider. Signatures-by signing, you are acknowledging that this After Visit Summary has been reviewed with you and you have received a copy. Patient Signature:  ____________________________________________________________ Date:  ____________________________________________________________ `    
    
 Provider Signature:  ____________________________________________________________ Date:  ____________________________________________________________

## 2018-10-15 NOTE — PROGRESS NOTES
Problem: Mobility Impaired (Adult and Pediatric) Goal: *Acute Goals and Plan of Care (Insert Text) Outcome: Resolved/Met Date Met: 10/15/18 physical Therapy EVALUATION and Discharge Patient: Lilly Champagne (67 y.o. male) Date: 10/15/2018 Primary Diagnosis: HNP (herniated nucleus pulposus), lumbar [M51.26] Procedure(s) (LRB): 
RIGHT L3/4 LAMINECTOMY DISCECTOMY/C-ARM (Right) Day of Surgery Precautions:   Fall, Spinal 
 
ASSESSMENT AND RECOMMENDATIONS: 
PT orders received and patient cleared by nursing to participate with therapy. Patient is a 80 y.o. male admitted to the hospital due to s/p R L3/4 laminectomy and discectomy Dr. Paco Kimball. Patient consents to PT evaluation and treatment. Pt supine in bed upon arrival with wife and daughter present. Daughter asked upon PT arrival about a urinal and stated pt needed to use the bathroom. PT informed that we can get up to the bathroom. PT educated pt first on spinal precautions and log rolling for safety prior to OOB. Pt states he usually uses a band/rope to pull on to assist getting OOB but PT educated further on log rolling for safety. Performed log rolling supervision for supine to sit. Sit to stands supervision. Pt used the urinal in the bathroom then sat back on the bed for family to be present again. Reviewed and educated pt and family on spinal precautions, ankle pumps, and OOB with nursing assistance 3-5 times a day (pt is also safe with family ambulating without being attached to any lines). Pt ambulated 200 feet in hallway (100 with RW and 100 no AD) supervision/modified independent. Pt had no LOB with any gait and is safe with no AD. Stair training 4 steps 1-2 HRA SBA. Pt ended therapy sitting in recliner with all needs met. Answered all of family and pt's questions. Pt is safe for d/c home with family and home health. Skilled physical therapy is not indicated at this time. Discharge Recommendations: Home Health Further Equipment Recommendations for Discharge: Pt has a RW to use as needed SUBJECTIVE:  
Patient stated If you are not laughing, you are not having fun.  OBJECTIVE DATA SUMMARY:  
 
Past Medical History:  
Diagnosis Date  BPH (benign prostatic hyperplasia)  Diabetes ([address]  ED (erectile dysfunction)  Hypertension  Nausea & vomiting Past Surgical History:  
Procedure Laterality Date  HX CATARACT REMOVAL    
 HX ORTHOPAEDIC    
 back surgery  HX SHOULDER REPLACEMENT Left 2016  HX TURP Barriers to Learning/Limitations: yes;  altered mental status (i.e.Sedation, Confusion) Compensate with: Visual Cues, Verbal Cues and Tactile Cues Prior Level of Function/Home Situation: Independent with mobility including gait no AD. Home Situation Home Environment: Private residence # Steps to Enter: 3 Rails to Enter: Yes Hand Rails : Bilateral 
One/Two Story Residence: One story Living Alone: No 
Support Systems: Spouse/Significant Other/Partner (lives wife) Patient Expects to be Discharged to[de-identified] Private residence Current DME Used/Available at Home: Walker, rolling, Cane, straight, Wheelchair, Commode, bedside Critical Behavior: 
Neurologic State: Alert Psychosocial 
Patient Behaviors: Calm; Cooperative Strength:   
Strength:  (B LE 4+/5) Tone & Sensation:  
Tone: Normal (B LE) Sensation: Intact (B LE) Range Of Motion: 
AROM: Within functional limits (B LE) Functional Mobility: 
Bed Mobility: 
Rolling: Supervision Supine to Sit: Supervision Scooting: Supervision Transfers: 
Sit to Stand: Supervision Stand to Sit: Supervision Balance:  
Sitting: Intact Standing: Intact Ambulation/Gait Training: 
Distance (ft): 200 Feet (ft) Assistive Device:  ( feet no  feet) Ambulation - Level of Assistance: Supervision;Modified independent Base of Support:  [facility]) Speed/Kera: Pace decreased (<100 feet/min) Stairs: 
Number of Stairs Trained: 4 Stairs - Level of Assistance: Stand-by assistance Rail Use:  (1-2 HRA) Therapeutic Exercises: Ankle pumps Pain: 
Pre: mild back Post: mild back Activity Tolerance:  
good Please refer to the flowsheet for vital signs taken during this treatment. After treatment:  
[x] Patient left in no apparent distress sitting up in chair 
[] Patient left sitting on EOB [] Patient left in no apparent distress in bed 
[] Patient declined to be OOB at this time due to   
[x] Call bell left within reach [x] Nursing notified(Mellissa) [x] Caregiver present 
[] Bed alarm activated 
[x] Personal items in reach COMMUNICATION/EDUCATION:  
[x]         Fall prevention education was provided and the patient/caregiver indicated understanding. [x]         Patient/family have participated as able in goal setting and plan of care. [x]         Patient/family agree to work toward stated goals and plan of care. []         Patient understands intent and goals of therapy, but is neutral about his/her participation. []         Patient is unable to participate in goal setting and plan of care. Thank you for this referral. 
Lazarus Castillo, PT, DPT Time Calculation: 33 mins Mobility [id number] Current  CI= 1-19%   Goal  CI= 1-19%  D/C  CI= 1-19%. The severity rating is based on the Level of Assistance required for Functional Mobility and ADLs. Eval Complexity: History: LOW Complexity : Zero comorbidities / personal factors that will impact the outcome / POCExam:HIGH Complexity : 4+ Standardized tests and measures addressing body structure, function, activity limitation and / or participation in recreation  Presentation: LOW Complexity : Stable, uncomplicated  Clinical Decision Making:Low Complexity   Overall Complexity:LOW

## 2018-10-16 VITALS
HEIGHT: 67 IN | HEART RATE: 71 BPM | DIASTOLIC BLOOD PRESSURE: 71 MMHG | BODY MASS INDEX: 27.15 KG/M2 | RESPIRATION RATE: 13 BRPM | SYSTOLIC BLOOD PRESSURE: 138 MMHG | OXYGEN SATURATION: 97 % | WEIGHT: 173 LBS | TEMPERATURE: 99.4 F

## 2018-10-16 LAB
ANION GAP SERPL CALC-SCNC: 7 MMOL/L (ref 3–18)
BUN SERPL-MCNC: 12 MG/DL (ref 7–18)
BUN/CREAT SERPL: 16 (ref 12–20)
CALCIUM SERPL-MCNC: 8.6 MG/DL (ref 8.5–10.1)
CHLORIDE SERPL-SCNC: 104 MMOL/L (ref 100–108)
CO2 SERPL-SCNC: 26 MMOL/L (ref 21–32)
CREAT SERPL-MCNC: 0.77 MG/DL (ref 0.6–1.3)
GLUCOSE BLD STRIP.AUTO-MCNC: 123 MG/DL (ref 70–110)
GLUCOSE SERPL-MCNC: 128 MG/DL (ref 74–99)
POTASSIUM SERPL-SCNC: 3.9 MMOL/L (ref 3.5–5.5)
SODIUM SERPL-SCNC: 137 MMOL/L (ref 136–145)

## 2018-10-16 PROCEDURE — 99218 HC RM OBSERVATION: CPT

## 2018-10-16 PROCEDURE — 74011250636 HC RX REV CODE- 250/636: Performed by: ORTHOPAEDIC SURGERY

## 2018-10-16 PROCEDURE — G8988 SELF CARE GOAL STATUS: HCPCS

## 2018-10-16 PROCEDURE — 82962 GLUCOSE BLOOD TEST: CPT

## 2018-10-16 PROCEDURE — 80048 BASIC METABOLIC PNL TOTAL CA: CPT | Performed by: ORTHOPAEDIC SURGERY

## 2018-10-16 PROCEDURE — G8987 SELF CARE CURRENT STATUS: HCPCS

## 2018-10-16 PROCEDURE — G8989 SELF CARE D/C STATUS: HCPCS

## 2018-10-16 PROCEDURE — 77030028907 HC WRP KNEE WO BGS SOLM -B

## 2018-10-16 PROCEDURE — 36415 COLL VENOUS BLD VENIPUNCTURE: CPT | Performed by: ORTHOPAEDIC SURGERY

## 2018-10-16 PROCEDURE — 74011250637 HC RX REV CODE- 250/637: Performed by: ORTHOPAEDIC SURGERY

## 2018-10-16 PROCEDURE — 97165 OT EVAL LOW COMPLEX 30 MIN: CPT

## 2018-10-16 RX ORDER — TRAMADOL HYDROCHLORIDE 50 MG/1
50 TABLET ORAL
Qty: 20 TAB | Refills: 0 | Status: SHIPPED | OUTPATIENT
Start: 2018-10-16 | End: 2018-10-19 | Stop reason: SDUPTHER

## 2018-10-16 RX ADMIN — AMLODIPINE BESYLATE 5 MG: 5 TABLET ORAL at 08:27

## 2018-10-16 RX ADMIN — OXYCODONE HYDROCHLORIDE 10 MG: 5 TABLET ORAL at 03:47

## 2018-10-16 RX ADMIN — GLIPIZIDE 2.5 MG: 2.5 TABLET, FILM COATED, EXTENDED RELEASE ORAL at 08:27

## 2018-10-16 RX ADMIN — OXYCODONE HYDROCHLORIDE 5 MG: 5 TABLET ORAL at 08:27

## 2018-10-16 RX ADMIN — PANTOPRAZOLE SODIUM 40 MG: 40 TABLET, DELAYED RELEASE ORAL at 08:27

## 2018-10-16 RX ADMIN — CEFAZOLIN SODIUM 2 G: 2 SOLUTION INTRAVENOUS at 03:47

## 2018-10-16 RX ADMIN — LISINOPRIL 40 MG: 40 TABLET ORAL at 08:27

## 2018-10-16 RX ADMIN — ACETAMINOPHEN 1000 MG: 500 TABLET, FILM COATED ORAL at 05:49

## 2018-10-16 RX ADMIN — Medication 10 ML: at 05:52

## 2018-10-16 RX ADMIN — METFORMIN HYDROCHLORIDE 500 MG: 500 TABLET ORAL at 08:27

## 2018-10-16 RX ADMIN — TAMSULOSIN HYDROCHLORIDE 0.4 MG: 0.4 CAPSULE ORAL at 08:27

## 2018-10-16 NOTE — PROGRESS NOTES
Rounded on patient post spine surgery. Activity: Reinforced importance of getting OOB for all meals, going to bathroom to help prevent blood clots. VTE prophylaxis: Instructed patient to use their SCD's when not up and walking. To use while in bed and in the chair. Educated re: ankle pumps to assist with circulation when in hospital and at home. Medications: Reviewed pain medications patient is taking and the importance of keeping pain under control to help with getting OOB and therapy. Reminded the patient to always eat a snack with their pain medication to help to prevent nausea. Encouraged patient to monitor for constipation and to take a stool softner/laxative while recovering and on pain medication. Instructed not to go over 3 days without a bowel movement. Patient educated to stay on stool softener and or mild laxative while on narcotics along with drinking 8 glasses of water a day (unless on a fluid restriction). Incentive Spirometry: Reinforced use of incentive spirometer with return demonstration by patient. 2250 ml x 3. Wound Care: Dressing to surgical site changed due to saturation with old blood. Incision approximated with no active bleeding noted. . Patient instructed not to take dressing off at home. Patient educated to bathe off daily and can use dial soap. Patient instructed not to shower for 72 hours after surgery. Stressed importance of using a clean towel and washcloth daily. Reminded to put on clean clothes and night clothes daily. Instructed to call nursing staff while in the hospital if dressing coming loose or feels wet. Instructed to call Home health agency or Dr Tra Vidales office for concerns about dressing at home. Ice Protocol: Ice gel pack in place per protocol. Patient Safety: Call light  and personal belongings in reach.  . Patient and family reminded to call for help toget OOB or when leaving bathroom for safety. Phone and other items also within reach per patient's request. Reviewed back safety:no bending, lifting, twisting and no lifting anything greater than 1/2 gallon of milk. Patient reminded to log roll to get OOB. Diet: Educated patient on the importance of eating three well balanced meals a day with protein to promote bone/muscle healing. Reminded patient to drink lots of fluids to protect kidneys from all the medications being taken currently with recovery. Patient given post op educational material to remind them to do all the things discussed to prevent post op complications and have a successful recovery. Patient and family verbalized understand of all information and education discussed. Patient and family given the opportunity for asking questions. Spine education book given to patient. Reviewed information with patient and family. Patient out of bed in chair refusing scd pumps. Patient is moving feet in chair.

## 2018-10-16 NOTE — PROGRESS NOTES
Discharge planning: 
 
Patient will likely discharge home today (10/16/2018). Patient has already been set up with home health services. Patient also has a solid support system in place to assist with care post discharge. Patient's family will provide transportation at the time of discharge. There are no other care manager concerns regarding this discharge. Karma Elaine. MS Care Manager [phone]

## 2018-10-16 NOTE — PROGRESS NOTES
conducted an initial consultation and Spiritual Assessment for Ben Pate, who is a 80 y.o.,male. Patients Primary Language is: [place]. According to the patients EMR Mandaen Affiliation is: No preference. The reason the Patient came to the hospital is:  
Patient Active Problem List  
 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 
Initiated a relationship of care and support. Explored issues of james, belief, spirituality and Buddhist/ritual needs while hospitalized. Listened empathically. Provided information about Spiritual Care Services. Offered prayer and assurance of continued prayers on patient's behalf. Chart reviewed. The following outcomes where achieved: 
Patient shared limited information about both their medical narrative and spiritual journey/beliefs.  confirmed Patient's Mandaen Affiliation. Patient expressed gratitude for 's visit. Assessment: 
Patient's james in God is very important for him to cope. Patient does not have any Buddhist/cultural needs that will affect patients preferences in health care. There are no spiritual or Buddhist issues which require intervention at this time. Plan: 
Chaplains will continue to follow and will provide pastoral care on an as needed/requested basis.  
 recommends bedside caregivers page  on duty if patient shows signs of acute spiritual or emotional distress. Rozann Favre [facility] 
([phone])

## 2018-10-16 NOTE — ROUTINE PROCESS
VSS. Afebrile. No s/sx of resp distress noted. SpO2 >=95% on RA. Up to bathroom w/ assist x's1, steady gait noted. Pt reported sharp lower back pain, prn pain meds given x'2 with relief reported. Currently pt is resting in bed. Call light and personal items within reach. Will continue to monitor.

## 2018-10-16 NOTE — DISCHARGE INSTRUCTIONS
Lumbar Laminectomy: What to Expect at [address] [place] [address] can expect your back to feel stiff or sore after surgery. This should improve in the weeks after surgery. You may have trouble sitting or standing in one position for very long and may need pain medicine in the weeks after your surgery. Your doctor may advise you to work with a physical therapist to strengthen the muscles around your spine and trunk. You will need to learn how to lift, twist, and bend so that you do not put too much strain on your back. This care sheet gives you a general idea about how long it will take for you to recover. But each person recovers at a different pace. Follow the steps below to get better as quickly as possible. How can you care for yourself at home? Activity    · Rest when you feel tired. Getting enough sleep will help you recover.     · Try to walk each day. Start by walking a little more than you did the day before. Bit by bit, increase the amount you walk. Walking boosts blood flow and helps prevent pneumonia and constipation. Walking may also decrease your muscle soreness after surgery.     · If advised by your doctor, you may need to avoid lifting anything that would cause excessive strain on your back. This may include a child, heavy grocery bags and milk containers, a heavy briefcase or backpack, cat litter or dog food bags, or a vacuum .     · Avoid strenuous activities, such as bicycle riding, jogging, weight lifting, or aerobic exercise, until your doctor says it is okay.     · Do not drive for 2 to 4 weeks after your surgery or until your doctor says it is okay.     · Avoid riding in a car for more than 30 minutes at a time for 2 to 4 weeks after surgery. If you must ride in a car for a longer distance, stop often to walk and stretch your legs.     · Try to change your position about every 30 minutes while sitting or standing.  This will help decrease your back pain while you are healing.     · You will probably need to take 4 to 6 weeks off from work. It depends on the type of work you do and how you feel.     · You may have sex as soon as you feel able, but avoid positions that put stress on your back or cause pain. Diet    · You can eat your normal diet. If your stomach is upset, try bland, low-fat foods like plain rice, broiled chicken, toast, and yogurt.     · Drink plenty of fluids (unless your doctor tells you not to).     · You may notice that your bowel movements are not regular right after your surgery. This is common. Try to avoid constipation and straining with bowel movements. You may want to take a fiber supplement every day. If you have not had a bowel movement after a couple of days, ask your doctor about taking a mild laxative. Medicines    · Your doctor will tell you if and when you can restart your medicines. He or she will also give you instructions about taking any new medicines.     · If you take blood thinners, such as warfarin (Coumadin), clopidogrel (Plavix), or aspirin, be sure to talk to your doctor. He or she will tell you if and when to start taking those medicines again. Make sure that you understand exactly what your doctor wants you to do.     · Take pain medicines exactly as directed. ¨ If the doctor gave you a prescription medicine for pain, take it as prescribed. ¨ If you are not taking a prescription pain medicine, ask your doctor if you can take an over-the-counter medicine.     · If your doctor prescribed antibiotics, take them as directed. Do not stop taking them just because you feel better. You need to take the full course of antibiotics.     · If you think your pain medicine is making you sick to your stomach:  ¨ Take your medicine after meals (unless your doctor has told you not to). ¨ Ask your doctor for a different pain medicine.    Incision care    · If you have strips of tape on the cut (incision) the doctor made, leave the tape on for a week or until it falls off.     · Wash the area daily with warm, soapy water and pat it dry.     · Keep the area clean and dry. You may cover it with a gauze bandage if it weeps or rubs against clothing. Change the bandage every day. Exercise    · Do back exercises as instructed by your doctor.     · Your doctor may advise you to work with a physical therapist to improve the strength and flexibility of your back. Other instructions    · To reduce stiffness and help sore muscles, use a warm water bottle, a heating pad set on low, or a warm cloth on your back. Do not put heat right over the incision. Do not go to sleep with a heating pad on your skin. Follow-up care is a key part of your treatment and safety. Be sure to make and go to all appointments, and call your doctor if you are having problems. It's also a good idea to know your test results and keep a list of the medicines you take. When should you call for help? Call 911 anytime you think you may need emergency care. For example, call if:    · You passed out (lost consciousness).     · You have sudden chest pain and shortness of breath, or you cough up blood.     · You are unable to move a leg at all.   [facility] your doctor now or seek immediate medical care if:    · You have new or worse symptoms in your legs or buttocks. Symptoms may include:  ¨ Numbness or tingling. ¨ Weakness. ¨ Pain.     · You lose bladder or bowel control.     · You have loose stitches, or your incision comes open.     · You have blood or fluid draining from the incision.     · You have signs of infection, such as:  ¨ Increased pain, swelling, warmth, or redness. ¨ Pus draining from the incision. ¨ A fever. ¨ Red streaks leading from the incision.    Watch closely for changes in your health, and be sure to contact your doctor if:    · You do not have a bowel movement after taking a laxative.     · You are not getting better as expected. Where can you learn more?   Go to http://prerna-alen.info/. Enter P212 in the search box to learn more about \"Lumbar Laminectomy: What to Expect at Home. \"  Current as of: 2017  Content Version: 11.8  © 8649-5494 [employer]. Care instructions adapted under license by [employer] (which disclaims liability or warranty for this information). If you have questions about a medical condition or this instruction, always ask your healthcare professional. [address] any warranty or liability for your use of this information. Patient armband removed and shredded    MyChart Activation    Thank you for requesting access to [employer] [facility]. Please follow the instructions below to securely access and download your online medical record. [employer] [facility] allows you to send messages to your doctor, view your test results, renew your prescriptions, schedule appointments, and more. How Do I Sign Up? 1. In your internet browser, go to [email or web address]  2. Click on the First Time User? Click Here link in the Sign In box. You will be redirect to the New Member Sign Up page. 3. Enter your [employer] [facility] Access Code exactly as it appears below. You will not need to use this code after youve completed the sign-up process. If you do not sign up before the expiration date, you must request a new code. [employer] [facility] Access Code: [email or web address]  Expires: 2018  3:51 PM (This is the date your [employer] [facility] access code will )    4. Enter the last four digits of your Social Security Number (xxxx) and Date of Birth (mm/dd/yyyy) as indicated and click Submit. You will be taken to the next sign-up page. 5. Create a [employer] [facility] ID. This will be your [employer] [facility] login ID and cannot be changed, so think of one that is secure and easy to remember. 6. Create a [employer] [facility] password. You can change your password at any time. 7. Enter your Password Reset Question and Answer.  This can be used at a later time if you forget your password. 8. Enter your e-mail address. You will receive e-mail notification when new information is available in [address]. 9. Click Sign Up. You can now view and download portions of your medical record. 10. Click the Download Summary menu link to download a portable copy of your medical information. Additional Information    If you have questions, please visit the Frequently Asked Questions section of the [employer] website at [email or web address] Remember, [employer] is NOT to be used for urgent needs. For medical emergencies, dial 911. DISCHARGE SUMMARY from Nurse    PATIENT INSTRUCTIONS:    After general anesthesia or intravenous sedation, for 24 hours or while taking prescription Narcotics:  · Limit your activities  · Do not drive and operate hazardous machinery  · Do not make important personal or business decisions  · Do  not drink alcoholic beverages  · If you have not urinated within 8 hours after discharge, please contact your surgeon on call. Report the following to your surgeon:  · Excessive pain, swelling, redness or odor of or around the surgical area  · Temperature over 100.5  · Nausea and vomiting lasting longer than 4 hours or if unable to take medications  · Any signs of decreased circulation or nerve impairment to extremity: change in color, persistent  numbness, tingling, coldness or increase pain  · Any questions    What to do at Home:  Recommended activity: Activity as tolerated    If you experience any of the following symptoms numbness, tingling, increased pain, fever/chills, please follow up with Dr. Caitlin Chakraborty. *  Please give a list of your current medications to your Primary Care Provider. *  Please update this list whenever your medications are discontinued, doses are      changed, or new medications (including over-the-counter products) are added. *  Please carry medication information at all times in case of emergency situations.     These are general instructions for a healthy lifestyle:    No smoking/ No tobacco products/ Avoid exposure to second hand smoke  Surgeon General's Warning:  Quitting smoking now greatly reduces serious risk to your health. Obesity, smoking, and sedentary lifestyle greatly increases your risk for illness    A healthy diet, regular physical exercise & weight monitoring are important for maintaining a healthy lifestyle    You may be retaining fluid if you have a history of heart failure or if you experience any of the following symptoms:  Weight gain of 3 pounds or more overnight or 5 pounds in a week, increased swelling in our hands or feet or shortness of breath while lying flat in bed. Please call your doctor as soon as you notice any of these symptoms; do not wait until your next office visit. Recognize signs and symptoms of STROKE:    F-face looks uneven    A-arms unable to move or move unevenly    S-speech slurred or non-existent    T-time-call 911 as soon as signs and symptoms begin-DO NOT go       Back to bed or wait to see if you get better-TIME IS BRAIN. Warning Signs of HEART ATTACK     Call 911 if you have these symptoms:   Chest discomfort. Most heart attacks involve discomfort in the center of the chest that lasts more than a few minutes, or that goes away and comes back. It can feel like uncomfortable pressure, squeezing, fullness, or pain.  Discomfort in other areas of the upper body. Symptoms can include pain or discomfort in one or both arms, the back, neck, jaw, or stomach.  Shortness of breath with or without chest discomfort.  Other signs may include breaking out in a cold sweat, nausea, or lightheadedness. Don't wait more than five minutes to call 911 - MINUTES MATTER! Fast action can save your life. Calling 911 is almost always the fastest way to get lifesaving treatment.  Emergency Medical Services staff can begin treatment when they arrive -- up to an hour sooner than if someone gets to the hospital by car. The discharge information has been reviewed with the patient. The patient verbalized understanding. Discharge medications reviewed with the patient and appropriate educational materials and side effects teaching were provided.   ___________________________________________________________________________________________________________________________________

## 2018-10-16 NOTE — PROGRESS NOTES
Problem: Falls - Risk of 
Goal: *Absence of Falls Document Bello Duarte Fall Risk and appropriate interventions in the flowsheet. Outcome: Progressing Towards Goal 
Fall Risk Interventions: 
  
 
  
 
Medication Interventions: Patient to call before getting OOB Elimination Interventions: Urinal in reach, Call light in reach

## 2018-10-16 NOTE — PROGRESS NOTES
\"Important Message from [employer]" reviewed and explained with the patient and/or representative at bedside and signature was obtained. A signed copy provided to patient/representative. Original signed document placed in patient's chart. Modesto Goldberg. [facility] Care Manager RATNA#[phone]

## 2018-10-16 NOTE — DISCHARGE SUMMARY
Discharge  Summary     Patient: Eloisa Mccracken MRN: [id number]  SSN: [id number]    YOB: 1932  Age: 80 y.o. Sex: male       Admit Date: 10/15/2018    Discharge Date: 10/16/2018      Admission Diagnoses: HNP (herniated nucleus pulposus), lumbar [M51.26]    Discharge Diagnoses:   Problem List as of 10/16/2018  Date Reviewed: 10/15/2018          Codes Class Noted - Resolved    Lumbar disc herniation ICD-10-CM: M51.26  ICD-9-CM: 722.10  10/15/2018 - Present        DDD (degenerative disc disease), lumbar ICD-10-CM: M51.36  ICD-9-CM: 722.52  9/12/2018 - Present        Lumbar post-laminectomy syndrome ICD-10-CM: M96.1  ICD-9-CM: 722.83  9/12/2018 - Present        Lumbar neuritis ICD-10-CM: M54.16  ICD-9-CM: 724.4  9/12/2018 - Present        Lumbar stenosis without neurogenic claudication ICD-10-CM: M48.061  ICD-9-CM: 724.02  9/12/2018 - Present        Lumbosacral spondylosis without myelopathy ICD-10-CM: M47.817  ICD-9-CM: 721.3  9/12/2018 - Present        Therapeutic drug monitoring ICD-10-CM: Z51.81  ICD-9-CM: V58.83  9/6/2018 - Present        Long term current use of opiate analgesic ICD-10-CM: Z79.891  ICD-9-CM: V58.69  9/6/2018 - Present        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       Hospital Course: Normal hospital course for this procedure. Tolerated surgical intervention well. Incision dry and intact. Ambulatory.          Disposition: home    Discharge Medications:      My Medications      TAKE these medications as instructed       Instructions Each Dose to Equal   Morning Noon Evening Bedtime    amLODIPine 5 mg tablet   Commonly known as:  NORVASC       Your last dose was: Your next dose is: Take 5 mg by mouth daily. 5 mg                        aspirin delayed-release 81 mg tablet       Your last dose was: Your next dose is: Take  by mouth daily. CINNAMON 500 mg Cap   Generic drug:  cinnamon bark       Your last dose was: Your next dose is: Take  by mouth two (2) times a day. DAILY MULTI-VITAMINS/IRON tablet   Generic drug:  multivitamin with iron       Your last dose was: Your next dose is: Take 1 tablet by mouth daily. 1 Tab                        diclofenac 1 % Gel   Commonly known as:  VOLTAREN       Your last dose was: Your next dose is:                                    DULoxetine 20 mg capsule   Commonly known as:  CYMBALTA       Your last dose was: Your next dose is: Take 1 Cap by mouth daily. Indications: NEUROPATHIC PAIN    20 mg                        dutasteride 0.5 mg capsule   Commonly known as:  AVODART       Your last dose was: Your next dose is: Take 1 Cap by mouth daily. 0.5 mg                        glipiZIDE SR 2.5 mg CR tablet   Commonly known as:  GLUCOTROL XL       Your last dose was: Your next dose is: Take  by mouth two (2) times a day. lisinopril 40 mg tablet   Commonly known as:  Theador Closs       Your last dose was: Your next dose is: Take 40 mg by mouth daily. 40 mg                        metFORMIN 500 mg tablet   Commonly known as:  GLUCOPHAGE       Your last dose was: Your next dose is: Take  by mouth two (2) times daily (with meals).                          NexIUM 20 mg capsule   Generic drug:  esomeprazole       Your last dose was: Your next dose is: Take  by mouth daily. saw palmetto 500 mg Cap       Your last dose was: Your next dose is: Take  by mouth. Indications: 2 at noon and 2 at dinner                         * traMADol 50 mg tablet   Commonly known as:  ULTRAM       Your last dose was: Your next dose is: Take 1 Tab by mouth every four (4) hours as needed. Max Daily Amount: 300 mg.    50 mg                        * traMADol 50 mg tablet   Commonly known as:  ULTRAM       Your last dose was: Your next dose is: Take 1 Tab by mouth every six (6) hours as needed for Pain. Max Daily Amount: 200 mg.    50 mg                        vitamin E acetate 400 unit Cap capsule       Your last dose was: Your next dose is: Take  by mouth daily. * Notice: This list has 2 medication(s) that are the same as other medications prescribed for you. Read the directions carefully, and ask your doctor or other care provider to review them with you. Where to Get Your Medications      Information on where to get these meds will be given to you by the nurse or doctor. ! Ask your nurse or doctor about these medications     traMADol 50 mg tablet               Follow-up Appointments   Procedures    FOLLOW UP VISIT Appointment in: Two Weeks     Standing Status:   Standing     Number of Occurrences:   1     Order Specific Question:   Appointment in     Answer:    Two Weeks       Signed By: Solomon Crockett NP     October 16, 2018

## 2018-10-16 NOTE — HOME CARE
Rec referral - d/c noted for today [facility] will follow per Dr. Constance Perez protocol -has w/c, cane and RW at home - no other dme needs at this time -  Savanna Haro RN

## 2018-10-17 ENCOUNTER — HOME CARE VISIT (OUTPATIENT)
Dept: SCHEDULING | Facility: HOME HEALTH | Age: 83
End: 2018-10-17
Payer: MEDICARE

## 2018-10-17 VITALS
DIASTOLIC BLOOD PRESSURE: 70 MMHG | OXYGEN SATURATION: 94 % | SYSTOLIC BLOOD PRESSURE: 150 MMHG | TEMPERATURE: 98.9 F | HEART RATE: 73 BPM

## 2018-10-17 PROCEDURE — G0151 HHCP-SERV OF PT,EA 15 MIN: HCPCS

## 2018-10-17 PROCEDURE — 3331090001 HH PPS REVENUE CREDIT

## 2018-10-17 PROCEDURE — 3331090002 HH PPS REVENUE DEBIT

## 2018-10-17 PROCEDURE — 400013 HH SOC

## 2018-10-18 ENCOUNTER — HOME CARE VISIT (OUTPATIENT)
Dept: HOME HEALTH SERVICES | Facility: HOME HEALTH | Age: 83
End: 2018-10-18
Payer: MEDICARE

## 2018-10-18 ENCOUNTER — HOME CARE VISIT (OUTPATIENT)
Dept: SCHEDULING | Facility: HOME HEALTH | Age: 83
End: 2018-10-18
Payer: MEDICARE

## 2018-10-18 ENCOUNTER — TELEPHONE (OUTPATIENT)
Dept: ORTHOPEDIC SURGERY | Age: 83
End: 2018-10-18

## 2018-10-18 DIAGNOSIS — M51.26 HNP (HERNIATED NUCLEUS PULPOSUS), LUMBAR: ICD-10-CM

## 2018-10-18 PROCEDURE — 3331090001 HH PPS REVENUE CREDIT

## 2018-10-18 PROCEDURE — 3331090002 HH PPS REVENUE DEBIT

## 2018-10-18 PROCEDURE — G0157 HHC PT ASSISTANT EA 15: HCPCS

## 2018-10-18 NOTE — TELEPHONE ENCOUNTER
Mrs. Earnest De La Cruz called as patient had [facility] [place] on 10/15 and was only given 20 pills of Tramadol on Tues 10/16 with no refillm upon D/C. Patient is taking 1 pill every 6 hrs and he will need addl refill of the Tramadol before this weekend. Please call Mrs. Earnest De La Cruz back at [phone].

## 2018-10-19 ENCOUNTER — HOME CARE VISIT (OUTPATIENT)
Dept: SCHEDULING | Facility: HOME HEALTH | Age: 83
End: 2018-10-19
Payer: MEDICARE

## 2018-10-19 DIAGNOSIS — R29.898 RIGHT LEG WEAKNESS: ICD-10-CM

## 2018-10-19 DIAGNOSIS — G89.29 CHRONIC RIGHT-SIDED LOW BACK PAIN WITH RIGHT-SIDED SCIATICA: ICD-10-CM

## 2018-10-19 DIAGNOSIS — M54.41 CHRONIC RIGHT-SIDED LOW BACK PAIN WITH RIGHT-SIDED SCIATICA: ICD-10-CM

## 2018-10-19 PROCEDURE — 3331090001 HH PPS REVENUE CREDIT

## 2018-10-19 PROCEDURE — G0157 HHC PT ASSISTANT EA 15: HCPCS

## 2018-10-19 PROCEDURE — 3331090002 HH PPS REVENUE DEBIT

## 2018-10-19 RX ORDER — TRAMADOL HYDROCHLORIDE 50 MG/1
50 TABLET ORAL
Qty: 20 TAB | Refills: 0 | OUTPATIENT
Start: 2018-10-19 | End: 2019-02-20

## 2018-10-19 NOTE — TELEPHONE ENCOUNTER
Signed. Please phone in. Please let patient know I have signed it for 1 tab every 8 hours. He should start tapering down and off the tramadol.

## 2018-10-19 NOTE — TELEPHONE ENCOUNTER
Phoned in Tramadol 50mg disp 20 no refills per NP Pillo message below. Spoke with patient spouse informed of direction change. She stated understanding, no further action needed at this time.

## 2018-10-20 ENCOUNTER — HOME CARE VISIT (OUTPATIENT)
Dept: SCHEDULING | Facility: HOME HEALTH | Age: 83
End: 2018-10-20
Payer: MEDICARE

## 2018-10-20 VITALS
DIASTOLIC BLOOD PRESSURE: 60 MMHG | RESPIRATION RATE: 18 BRPM | SYSTOLIC BLOOD PRESSURE: 116 MMHG | HEART RATE: 67 BPM | SYSTOLIC BLOOD PRESSURE: 156 MMHG | OXYGEN SATURATION: 87 % | TEMPERATURE: 98.1 F | OXYGEN SATURATION: 98 % | DIASTOLIC BLOOD PRESSURE: 83 MMHG | TEMPERATURE: 98 F | RESPIRATION RATE: 18 BRPM | HEART RATE: 67 BPM

## 2018-10-20 PROCEDURE — 3331090002 HH PPS REVENUE DEBIT

## 2018-10-20 PROCEDURE — 3331090001 HH PPS REVENUE CREDIT

## 2018-10-20 PROCEDURE — G0157 HHC PT ASSISTANT EA 15: HCPCS

## 2018-10-21 ENCOUNTER — HOME CARE VISIT (OUTPATIENT)
Dept: SCHEDULING | Facility: HOME HEALTH | Age: 83
End: 2018-10-21
Payer: MEDICARE

## 2018-10-21 VITALS
TEMPERATURE: 99 F | HEART RATE: 71 BPM | DIASTOLIC BLOOD PRESSURE: 64 MMHG | OXYGEN SATURATION: 97 % | SYSTOLIC BLOOD PRESSURE: 132 MMHG

## 2018-10-21 PROCEDURE — G0157 HHC PT ASSISTANT EA 15: HCPCS

## 2018-10-21 PROCEDURE — 3331090001 HH PPS REVENUE CREDIT

## 2018-10-21 PROCEDURE — 3331090002 HH PPS REVENUE DEBIT

## 2018-10-22 ENCOUNTER — HOME CARE VISIT (OUTPATIENT)
Dept: SCHEDULING | Facility: HOME HEALTH | Age: 83
End: 2018-10-22
Payer: MEDICARE

## 2018-10-22 VITALS
TEMPERATURE: 97.7 F | DIASTOLIC BLOOD PRESSURE: 64 MMHG | OXYGEN SATURATION: 96 % | HEART RATE: 74 BPM | SYSTOLIC BLOOD PRESSURE: 142 MMHG

## 2018-10-22 VITALS
HEART RATE: 96 BPM | OXYGEN SATURATION: 73 % | DIASTOLIC BLOOD PRESSURE: 68 MMHG | TEMPERATURE: 98.3 F | SYSTOLIC BLOOD PRESSURE: 138 MMHG

## 2018-10-22 PROCEDURE — 3331090002 HH PPS REVENUE DEBIT

## 2018-10-22 PROCEDURE — G0151 HHCP-SERV OF PT,EA 15 MIN: HCPCS

## 2018-10-22 PROCEDURE — 3331090001 HH PPS REVENUE CREDIT

## 2018-10-31 ENCOUNTER — OFFICE VISIT (OUTPATIENT)
Dept: ORTHOPEDIC SURGERY | Age: 83
End: 2018-10-31

## 2018-10-31 VITALS
DIASTOLIC BLOOD PRESSURE: 75 MMHG | BODY MASS INDEX: 27.94 KG/M2 | RESPIRATION RATE: 18 BRPM | HEIGHT: 67 IN | HEART RATE: 73 BPM | WEIGHT: 178 LBS | SYSTOLIC BLOOD PRESSURE: 169 MMHG

## 2018-10-31 DIAGNOSIS — Z98.890 S/P LUMBAR LAMINECTOMY: Primary | ICD-10-CM

## 2018-10-31 RX ORDER — MELOXICAM 15 MG/1
TABLET ORAL
Refills: 2 | COMMUNITY
Start: 2018-10-24 | End: 2019-03-21

## 2018-10-31 NOTE — PATIENT INSTRUCTIONS
Lumbar Laminectomy: What to Expect at [address] [place] [address] can expect your back to feel stiff or sore after surgery. This should improve in the weeks after surgery. You may have trouble sitting or standing in one position for very long and may need pain medicine in the weeks after your surgery. Your doctor may advise you to work with a physical therapist to strengthen the muscles around your spine and trunk. You will need to learn how to lift, twist, and bend so that you do not put too much strain on your back. This care sheet gives you a general idea about how long it will take for you to recover. But each person recovers at a different pace. Follow the steps below to get better as quickly as possible. How can you care for yourself at home? Activity    · Rest when you feel tired. Getting enough sleep will help you recover.     · Try to walk each day. Start by walking a little more than you did the day before. Bit by bit, increase the amount you walk. Walking boosts blood flow and helps prevent pneumonia and constipation. Walking may also decrease your muscle soreness after surgery.     · If advised by your doctor, you may need to avoid lifting anything that would cause excessive strain on your back. This may include a child, heavy grocery bags and milk containers, a heavy briefcase or backpack, cat litter or dog food bags, or a vacuum .     · Avoid strenuous activities, such as bicycle riding, jogging, weight lifting, or aerobic exercise, until your doctor says it is okay.     · Do not drive for 2 to 4 weeks after your surgery or until your doctor says it is okay.     · Avoid riding in a car for more than 30 minutes at a time for 2 to 4 weeks after surgery. If you must ride in a car for a longer distance, stop often to walk and stretch your legs.     · Try to change your position about every 30 minutes while sitting or standing.  This will help decrease your back pain while you are healing.     · You will probably need to take 4 to 6 weeks off from work. It depends on the type of work you do and how you feel.     · You may have sex as soon as you feel able, but avoid positions that put stress on your back or cause pain. Diet    · You can eat your normal diet. If your stomach is upset, try bland, low-fat foods like plain rice, broiled chicken, toast, and yogurt.     · Drink plenty of fluids (unless your doctor tells you not to).     · You may notice that your bowel movements are not regular right after your surgery. This is common. Try to avoid constipation and straining with bowel movements. You may want to take a fiber supplement every day. If you have not had a bowel movement after a couple of days, ask your doctor about taking a mild laxative. Medicines    · Your doctor will tell you if and when you can restart your medicines. He or she will also give you instructions about taking any new medicines.     · If you take blood thinners, such as warfarin (Coumadin), clopidogrel (Plavix), or aspirin, be sure to talk to your doctor. He or she will tell you if and when to start taking those medicines again. Make sure that you understand exactly what your doctor wants you to do.     · Take pain medicines exactly as directed. ? If the doctor gave you a prescription medicine for pain, take it as prescribed. ? If you are not taking a prescription pain medicine, ask your doctor if you can take an over-the-counter medicine.     · If your doctor prescribed antibiotics, take them as directed. Do not stop taking them just because you feel better. You need to take the full course of antibiotics.     · If you think your pain medicine is making you sick to your stomach:  ? Take your medicine after meals (unless your doctor has told you not to). ? Ask your doctor for a different pain medicine.    Incision care    · If you have strips of tape on the cut (incision) the doctor made, leave the tape on for a week or until it falls off.     · Wash the area daily with warm, soapy water and pat it dry.     · Keep the area clean and dry. You may cover it with a gauze bandage if it weeps or rubs against clothing. Change the bandage every day. Exercise    · Do back exercises as instructed by your doctor.     · Your doctor may advise you to work with a physical therapist to improve the strength and flexibility of your back. Other instructions    · To reduce stiffness and help sore muscles, use a warm water bottle, a heating pad set on low, or a warm cloth on your back. Do not put heat right over the incision. Do not go to sleep with a heating pad on your skin. Follow-up care is a key part of your treatment and safety. Be sure to make and go to all appointments, and call your doctor if you are having problems. It's also a good idea to know your test results and keep a list of the medicines you take. When should you call for help? Call 911 anytime you think you may need emergency care. For example, call if:    · You passed out (lost consciousness).     · You have sudden chest pain and shortness of breath, or you cough up blood.     · You are unable to move a leg at all.   [facility] your doctor now or seek immediate medical care if:    · You have new or worse symptoms in your legs or buttocks. Symptoms may include:  ? Numbness or tingling. ? Weakness. ? Pain.     · You lose bladder or bowel control.     · You have loose stitches, or your incision comes open.     · You have blood or fluid draining from the incision.     · You have signs of infection, such as:  ? Increased pain, swelling, warmth, or redness. ? Pus draining from the incision. ? A fever. ? Red streaks leading from the incision.    Watch closely for changes in your health, and be sure to contact your doctor if:    · You do not have a bowel movement after taking a laxative.     · You are not getting better as expected. Where can you learn more?   Go to http://prerna-alen.info/. Enter P441 in the search box to learn more about \"Lumbar Laminectomy: What to Expect at Home. \"  Current as of: November 29, 2017  Content Version: 11.8  © 4617-5264 Healthwise, [facility]. Care instructions adapted under license by [employer] (which disclaims liability or warranty for this information). If you have questions about a medical condition or this instruction, always ask your healthcare professional. [address] any warranty or liability for your use of this information.

## 2018-10-31 NOTE — PROGRESS NOTES
Chief complaint/History of Present Illness:  Chief Complaint   Patient presents with    Back Pain     Post OP for surgery    Leg Pain     RLE     ADE Mccracken is a  80 y.o.  male      HISTORY OF PRESENT ILLNESS:  The patient comes in today 2 weeks status post his right L3-4 laminectomy, discectomy. He is doing great. His right leg pain has nearly resolved. He only gets a twinge of pain in his right buttock. He states he has not felt this well in quite awhile. He is basically off the tramadol. The last time he took it was last Sunday at [facility] when he had to sit for a long time. He is on Cymbalta 20 mg daily. He keeps repeating over and over how pleased he is with our practice and how we were able to get him and into surgery in a reasonable amount of time. He works at the [employer] about 7 days a month. He is not back to work yet and will be going back after his 6-week appointment. He denies fever, bowel or bladder dysfunction. PHYSICAL EXAMINATION:  Mr. Kaylynn Cheek is an 59-year-old male. He is alert and oriented. Normal mood and affect. He has a full weightbearing, nonantalgic gait. No assistive device. He has 4/5 strength in bilateral lower extremities. Negative straight leg raise. His posterior lumbar incision is healing nicely. The edges are well approximated. There is no erythema, drainage or signs of infection. ASSESSMENT/PLAN:  This is a patient 2 weeks out from his right L3-4 laminectomy. He is doing wonderful. He is very pleased with the outcome of his surgery. We went over wound care and activity level. I told him he could restart his Mobic if he needs to, as needed. We will see him back in 4 weeks with Dr. Volodymyr Villa. We will keep him out of work now until he sees Dr. Volodymyr Villa.           Review of systems:    Past Medical History:   Diagnosis Date    BPH (benign prostatic hyperplasia)     Diabetes ([address]     ED (erectile dysfunction)     Hypertension  Nausea & vomiting      Past Surgical History:   Procedure Laterality Date    HX CATARACT REMOVAL      HX ORTHOPAEDIC      back surgery    HX SHOULDER REPLACEMENT Left 2016    HX TURP       Social History     Socioeconomic History    Marital status:      Spouse name: Not on file    Number of children: Not on file    Years of education: Not on file    Highest education level: Not on file   Social Needs    Financial resource strain: Not on file    Food insecurity - worry: Not on file    Food insecurity - inability: Not on file    Transportation needs - medical: Not on file   [employer] needs - non-medical: Not on file   Occupational History    Not on file   Tobacco Use    Smoking status: Former Smoker     Types: Cigars    Smokeless tobacco: Never Used   Substance and Sexual Activity    Alcohol use: No    Drug use: No    Sexual activity: Not on file   Other Topics Concern    Not on file   Social History Narrative    Not on file     History reviewed. No pertinent family history. Physical Exam:  Visit Vitals  /75   Pulse 73   Resp 18   Ht 5' 6.5\" (1.689 m)   Wt 178 lb (80.7 kg)   BMI 28.30 kg/m²     Pain Scale: 2/10          has been . reviewed and is appropriate          Diagnoses and all orders for this visit:    1. S/P lumbar laminectomy            Follow-up Disposition:  Return in about 4 weeks (around 11/28/2018) for with Dr. Rosa Morris.         We have informed Parker Nika to notify us for immediate appointment if he has any worsening neurogical symptoms or if an emergency situation presents, then call [phone]

## 2018-11-27 ENCOUNTER — OFFICE VISIT (OUTPATIENT)
Dept: ORTHOPEDIC SURGERY | Age: 83
End: 2018-11-27

## 2018-11-27 VITALS
TEMPERATURE: 98.1 F | DIASTOLIC BLOOD PRESSURE: 86 MMHG | HEART RATE: 73 BPM | SYSTOLIC BLOOD PRESSURE: 142 MMHG | RESPIRATION RATE: 17 BRPM

## 2018-11-27 DIAGNOSIS — M51.36 DDD (DEGENERATIVE DISC DISEASE), LUMBAR: ICD-10-CM

## 2018-11-27 DIAGNOSIS — M96.1 LUMBAR POST-LAMINECTOMY SYNDROME: ICD-10-CM

## 2018-11-27 DIAGNOSIS — Z98.890 S/P LUMBAR LAMINECTOMY: Primary | ICD-10-CM

## 2018-11-27 DIAGNOSIS — M48.061 LUMBAR STENOSIS WITHOUT NEUROGENIC CLAUDICATION: ICD-10-CM

## 2018-11-27 DIAGNOSIS — M51.26 HNP (HERNIATED NUCLEUS PULPOSUS), LUMBAR: ICD-10-CM

## 2018-11-27 RX ORDER — DULOXETIN HYDROCHLORIDE 20 MG/1
CAPSULE, DELAYED RELEASE ORAL
Qty: 30 CAP | Refills: 1 | Status: SHIPPED | OUTPATIENT
Start: 2018-11-27 | End: 2019-02-20

## 2018-11-27 NOTE — LETTER
11/27/2018 10:54 AM 
 
Mr. Jesica Farnsworth 
[address] [facility] [address] [address] To Whom It May Concern: 
 
Jesica Farnsworth is currently under the care of [facility]. He may return to work on Saturday, Dec 1st. 
 
If there are questions or concerns please have the patient contact our office.  
 
 
 
Sincerely, 
 
 
 
Ganesh Campa MD

## 2018-11-27 NOTE — PROGRESS NOTES
[facility] [address]  [address], [address]  [place]  Phone: [phone]  Fax: [phone]  PROGRESS NOTE  Patient: Cecile Carbajal                MRN: [id number]       SSN: [id number]  YOB: 1932        AGE: 80 y.o. SEX: male  There is no height or weight on file to calculate BMI. PCP: Dariana Reina MD  11/27/18    Chief Complaint   Patient presents with    Back Pain     6 wk post op       HISTORY OF PRESENT ILLNESS, RADIOGRAPHS, and PLAN:     HISTORY OF PRESENT ILLNESS:  Mr. Cheryle Conine returns today. He is 6 weeks out from his right L3-4 laminectomy. He says he has had absolutely no pain since his surgery. He rates is pain as 0. He is thrilled. PHYSICAL EXAMINATION:  His wound is healed and dry. He is neurologically intact. ASSESSMENT/PLAN:  I am pleased by his outcome. He would like to go back to his part-time job, and I will allow that. We will see him back again as needed. cc:  Dr. Sinan Kyle          Past Medical History:   Diagnosis Date    BPH (benign prostatic hyperplasia)     [facility]     ED (erectile dysfunction)     Hypertension     Nausea & vomiting        History reviewed. No pertinent family history. Current Outpatient Medications   Medication Sig Dispense Refill    meloxicam (MOBIC) 15 mg tablet TAKE 1 TAB DAILY W/ FOOD AS NEEDED FOR BACK PAIN (DO NOT USE ASPIRIN/IBUPROFEN, MAY USE TYLENOL)  2    diclofenac (VOLTAREN) 1 % gel       dutasteride (AVODART) 0.5 mg capsule Take 1 Cap by mouth daily. (Patient taking differently: Take 0.5 mg by mouth every seven (7) days.) 90 Cap 3    saw palmetto 500 mg cap Take  by mouth. Indications: 2 at noon and 2 at dinner      lisinopril (PRINIVIL, ZESTRIL) 40 mg tablet Take 40 mg by mouth daily.  amLODIPine (NORVASC) 5 mg tablet Take 5 mg by mouth daily.  glipiZIDE SR (GLUCOTROL) 2.5 mg CR tablet Take  by mouth two (2) times a day.       metFORMIN (GLUCOPHAGE) 500 mg tablet Take  by mouth two (2) times daily (with meals).  esomeprazole (NEXIUM) 20 mg capsule Take  by mouth daily.  aspirin delayed-release 81 mg tablet Take  by mouth daily.  multivitamin with iron (DAILY MULTI-VITAMINS/IRON) tablet Take 1 tablet by mouth daily.  VITAMIN E, DL,TOCOPHERYL ACET, (VITAMIN E ACETATE) 400 unit cap capsule Take  by mouth daily.  cinnamon bark (CINNAMON) 500 mg cap Take  by mouth two (2) times a day.  traMADol (ULTRAM) 50 mg tablet Take 1 Tab by mouth every eight (8) hours as needed for Pain. Max Daily Amount: 150 mg. 20 Tab 0    DULoxetine (CYMBALTA) 20 mg capsule Take 1 Cap by mouth daily. Indications: NEUROPATHIC PAIN 30 Cap 1    traMADol (ULTRAM) 50 mg tablet Take 1 Tab by mouth every four (4) hours as needed.  Max Daily Amount: 300 mg. 48 Tab 0       Allergies   Allergen Reactions    Sulfa (Sulfonamide Antibiotics) Other (comments)     Patient states not allergic       Past Surgical History:   Procedure Laterality Date    HX BACK SURGERY  10/15/2018    HX CATARACT REMOVAL      HX ORTHOPAEDIC      back surgery    HX SHOULDER REPLACEMENT Left 2016    HX TURP         Past Medical History:   Diagnosis Date    BPH (benign prostatic hyperplasia)     [facility] [address]     ED (erectile dysfunction)     Hypertension     Nausea & vomiting        Social History     Socioeconomic History    Marital status:      Spouse name: Not on file    Number of children: Not on file    Years of education: Not on file    Highest education level: Not on file   Social Needs    Financial resource strain: Not on file    Food insecurity - worry: Not on file    Food insecurity - inability: Not on file   [employer] needs - medical: Not on file   [employer] needs - non-medical: Not on file   Occupational History    Not on file   Tobacco Use    Smoking status: Former Smoker     Types: Cigars    Smokeless tobacco: Never Used   Substance and Sexual Activity    Alcohol use: No    Drug use: No    Sexual activity: Not on file   Other Topics Concern     Service Not Asked    Blood Transfusions Not Asked    Caffeine Concern Not Asked    Occupational Exposure Not Asked    Hobby Hazards Not Asked    Sleep Concern Not Asked    Stress Concern Not Asked    Weight Concern Not Asked    Special Diet Not Asked    Back Care Not Asked    Exercise Not Asked    Bike Helmet Not Asked   [address] Not Asked    Self-Exams Not Asked   Social History Narrative    Not on file         REVIEW OF SYSTEMS:   CONSTITUTIONAL SYMPTOMS:  Negative. EYES:  Negative. EARS, NOSE, THROAT AND MOUTH:  Negative. CARDIOVASCULAR:  Negative. RESPIRATORY:  Negative. GENITOURINARY: Per HPI. GASTROINTESTINAL:  Per HPI. INTEGUMENTARY (SKIN AND/OR BREAST):  Negative. MUSCULOSKELETAL: Per HPI.   ENDOCRINE/RHEUMATOLOGIC:  Negative. NEUROLOGICAL:  Per HPI. HEMATOLOGIC/LYMPHATIC:  Negative. ALLERGIC/IMMUNOLOGIC:  Negative. PSYCHIATRIC:  Negative. PHYSICAL EXAMINATION:   Visit Vitals  /86 (BP 1 Location: Left arm, BP Patient Position: Sitting)   Pulse 73   Temp 98.1 °F (36.7 °C) (Oral)   Resp 17    PAIN SCALE: 0 - No pain/10    CONSTITUTIONAL: The patient is in no apparent distress and is alert and oriented x 3. HEENT: Normocephalic. Hearing grossly intact. NECK: Supple and symmetric. no tenderness, or masses were felt. RESPIRATORY: No labored breathing. CARDIOVASCULAR: The carotid pulses were normal. Peripheral pulses were 2+. CHEST: Normal AP diameter and normal contour without any kyphoscoliosis. LYMPHATIC: No lymphadenopathy was appreciated in the neck, axillae or groin. SKIN:  Incision healing well, no drainage, no erythema, no hernia, no seroma, no swelling, no dehiscence, incision well approximated.  Negative for scars, rashes, lesions, or ulcers on the right upper, right lower, left upper, left lower and trunk.   NEUROLOGICAL: Alert and oriented x 3. Ambulation without assistive device. FWB. EXTREMITIES: See musculoskeletal.  MUSCULOSKELETAL:   Head and Neck:  Negative for misalignment, asymmetry, crepitation, defects, tenderness masses or effusions.  Left Upper Extremity: Inspection, percussion and palpation performed. Cowarts sign is negative.  Right Upper Extremity: Inspection, percussion and palpation performed. Cowarts sign is negative.  Spine, Ribs and Pelvis: Inspection, percussion and palpation performed. Negative for misalignment, asymmetry, crepitation, defects, tenderness masses or effusions.  Left Lower Extremity: Inspection, percussion and palpation performed. Negative straight leg raise.  Right Lower Extremity: Inspection, percussion and palpation performed. Negative straight leg raise. SPINE EXAM:     Lumbar spine: No rash, ecchymosis, or gross obliquity. No focal atrophy is noted. ASSESSMENT    ICD-10-CM ICD-9-CM    1. S/P lumbar laminectomy Z98.890 V45.89    2. HNP (herniated nucleus pulposus), lumbar M51.26 722.10        Written by Leatha Jessica, as dictated by Arielle Padilla MD.    I, Dr. Arielle Padilla MD, confirm that all documentation is accurate.

## 2019-01-04 ENCOUNTER — OFFICE VISIT (OUTPATIENT)
Dept: ORTHOPEDIC SURGERY | Age: 84
End: 2019-01-04

## 2019-01-04 VITALS
SYSTOLIC BLOOD PRESSURE: 183 MMHG | BODY MASS INDEX: 29.89 KG/M2 | TEMPERATURE: 99.4 F | DIASTOLIC BLOOD PRESSURE: 81 MMHG | RESPIRATION RATE: 22 BRPM | OXYGEN SATURATION: 99 % | HEIGHT: 66 IN | HEART RATE: 77 BPM | WEIGHT: 186 LBS

## 2019-01-04 DIAGNOSIS — Z98.890 S/P LUMBAR LAMINECTOMY: Primary | ICD-10-CM

## 2019-01-04 DIAGNOSIS — M51.26 HNP (HERNIATED NUCLEUS PULPOSUS), LUMBAR: ICD-10-CM

## 2019-01-04 RX ORDER — GABAPENTIN 100 MG/1
100 CAPSULE ORAL 3 TIMES DAILY
Qty: 90 CAP | Refills: 3 | Status: SHIPPED | OUTPATIENT
Start: 2019-01-04 | End: 2020-05-01 | Stop reason: SDUPTHER

## 2019-01-04 NOTE — PROGRESS NOTES
[facility] 
[address], [address] Phone: [phone] Fax: [phone] PROGRESS NOTE Patient: Krystal Abrams                MRN: [id number]       SSN: [id number] YOB: 1932        AGE: 80 y.o. SEX: male Body mass index is 30.02 kg/m². PCP: Tisha Dunaway MD 
01/04/19 Chief Complaint Patient presents with  Back Pain  
  lower  Leg Pain  
  left  Surgical Follow-up HISTORY OF PRESENT ILLNESS, RADIOGRAPHS, and PLAN:  
 
HISTORY OF PRESENT ILLNESS:  Mr. Fatimah Olmstead returns today. He is status post a right L3-L4 laminectomy this past 10/2018, with total recovery. About 4 weeks ago, he began to experience some left-sided buttock and lateral anterior thigh pain, similar to what he had on the right side. He has no objective neurology. No nerve tension signs. He had stopped his gabapentin and all the other medications. He finds it annoying and rates his pain at a 6/10. He wants to be fixed. He had such a miraculous result from his initial surgery. ASSESSMENT/PLAN:  I have explained to him that we will try him on some gabapentin and see if that helps him, but I will obtain a new MRI as is desired, and we will see if there is new contralateral pathology that explains his pain. He may in fact have contralateral disc herniation. I will see him back again after his MRI. cc:  Dr. Juno De La Rosa Past Medical History:  
Diagnosis Date  BPH (benign prostatic hyperplasia)  Diabetes ([address]  ED (erectile dysfunction)  Hypertension  Nausea & vomiting History reviewed. No pertinent family history. Current Outpatient Medications Medication Sig Dispense Refill  DULoxetine (CYMBALTA) 20 mg capsule TAKE 1 CAPSULE BY MOUTH DAILY FOR NEUROPATHIC PAIN 30 Cap 1  
 meloxicam (MOBIC) 15 mg tablet TAKE 1 TAB DAILY W/ FOOD AS NEEDED FOR BACK PAIN (DO NOT USE ASPIRIN/IBUPROFEN, MAY USE TYLENOL)  2  
 traMADol (ULTRAM) 50 mg tablet Take 1 Tab by mouth every four (4) hours as needed. Max Daily Amount: 300 mg. 48 Tab 0  
 dutasteride (AVODART) 0.5 mg capsule Take 1 Cap by mouth daily. (Patient taking differently: Take 0.5 mg by mouth every seven (7) days.) 90 Cap 3  
 lisinopril (PRINIVIL, ZESTRIL) 40 mg tablet Take 40 mg by mouth daily.  amLODIPine (NORVASC) 5 mg tablet Take 5 mg by mouth daily.  glipiZIDE SR (GLUCOTROL) 2.5 mg CR tablet Take  by mouth two (2) times a day.  metFORMIN (GLUCOPHAGE) 500 mg tablet Take  by mouth two (2) times daily (with meals).  esomeprazole (NEXIUM) 20 mg capsule Take  by mouth daily.  aspirin delayed-release 81 mg tablet Take  by mouth daily.  multivitamin with iron (DAILY MULTI-VITAMINS/IRON) tablet Take 1 tablet by mouth daily.  VITAMIN E, DL,TOCOPHERYL ACET, (VITAMIN E ACETATE) 400 unit cap capsule Take  by mouth daily.  cinnamon bark (CINNAMON) 500 mg cap Take  by mouth two (2) times a day.  traMADol (ULTRAM) 50 mg tablet Take 1 Tab by mouth every eight (8) hours as needed for Pain. Max Daily Amount: 150 mg. 20 Tab 0  
 diclofenac (VOLTAREN) 1 % gel  saw palmetto 500 mg cap Take  by mouth. Indications: 2 at noon and 2 at dinner Allergies Allergen Reactions  Sulfa (Sulfonamide Antibiotics) Other (comments) Patient states not allergic Past Surgical History:  
Procedure Laterality Date  HX BACK SURGERY  10/15/2018  HX CATARACT REMOVAL    
 HX ORTHOPAEDIC    
 back surgery  HX SHOULDER REPLACEMENT Left 2016  HX TURP Past Medical History:  
Diagnosis Date  BPH (benign prostatic hyperplasia)  Diabetes ([address]  ED (erectile dysfunction)  Hypertension  Nausea & vomiting Social History Socioeconomic History  Marital status:  Spouse name: Not on file  Number of children: Not on file  Years of education: Not on file  Highest education level: Not on file Social Needs  Financial resource strain: Not on file  Food insecurity - worry: Not on file  Food insecurity - inability: Not on file  Transportation needs - medical: Not on file  Transportation needs - non-medical: Not on file Occupational History  Not on file Tobacco Use  Smoking status: Former Smoker Types: Cigars  Smokeless tobacco: Never Used Substance and Sexual Activity  Alcohol use: No  
 Drug use: No  
 Sexual activity: Not on file Other Topics Concern [address] Service Not Asked  Blood Transfusions Not Asked  Caffeine Concern Not Asked  Occupational Exposure Not Asked Mellisa Clas Hazards Not Asked  Sleep Concern Not Asked  Stress Concern Not Asked  Weight Concern Not Asked  Special Diet Not Asked  Back Care Not Asked  Exercise Not Asked  Bike Helmet Not Asked  Seat Belt Not Asked  Self-Exams Not Asked Social History Narrative  Not on file REVIEW OF SYSTEMS: 
 CONSTITUTIONAL SYMPTOMS:  Negative. EYES:  Negative. EARS, NOSE, THROAT AND MOUTH:  Negative. CARDIOVASCULAR:  Negative. RESPIRATORY:  Negative. GENITOURINARY: Negative. GASTROINTESTINAL:  Negative. INTEGUMENTARY (SKIN AND/OR BREAST):  Negative. MUSCULOSKELETAL: Per HPI. ENDOCRINE/RHEUMATOLOGIC:  Negative. NEUROLOGICAL:  Per HPI. HEMATOLOGIC/LYMPHATIC:  Negative. ALLERGIC/IMMUNOLOGIC:  Negative. PSYCHIATRIC:  Negative. PHYSICAL EXAMINATION:  
Visit Vitals /81 Pulse 77 Temp 99.4 °F (37.4 °C) (Oral) Resp 22 Ht 5' 6\" (1.676 m) Wt 186 lb (84.4 kg) SpO2 99% BMI 30.02 kg/m² PAIN SCALE: 6/10 CONSTITUTIONAL: The patient is in no apparent distress and is alert and oriented x 3. HEENT: Normocephalic. Hearing grossly intact. NECK: Supple and symmetric. no tenderness, or masses were felt. RESPIRATORY: No labored breathing. CARDIOVASCULAR: The carotid pulses were normal. Peripheral pulses were 2+. CHEST: Normal AP diameter and normal contour without any kyphoscoliosis. LYMPHATIC: No lymphadenopathy was appreciated in the neck, axillae or groin. SKIN: Negative for scars, rashes, lesions, or ulcers on the right upper, right lower, left upper, left lower and trunk. NEUROLOGICAL: Alert and oriented x 3. Ambulation without assistive device. FWB. EXTREMITIES: See musculoskeletal. 
MUSCULOSKELETAL: 
? Spine, Ribs and Pelvis: Inspection, percussion and palpation performed. Negative for misalignment, asymmetry, crepitation, defects, tenderness masses or effusions. ? Left Lower Extremity: Inspection, percussion and palpation performed. Negative straight leg raise. ? Right Lower Extremity: Inspection, percussion and palpation performed. Negative straight leg raise. SPINE EXAM:  
 
Lumbar spine: No rash, ecchymosis, or gross obliquity. No focal atrophy is noted. ASSESSMENT 
  ICD-10-CM ICD-9-CM 1. S/P lumbar laminectomy Z98.890 V45.89 gabapentin (NEURONTIN) 100 mg capsule MRI LUMB SPINE W WO CONT 2. HNP (herniated nucleus pulposus), lumbar M51.26 722.10 gabapentin (NEURONTIN) 100 mg capsule MRI LUMB SPINE W WO CONT Written by En Wolff, as dictated by Wenceslao Serra MD. 
 
I, Dr. Wenceslao Serra MD, confirm that all documentation is accurate.

## 2019-01-07 ENCOUNTER — DOCUMENTATION ONLY (OUTPATIENT)
Dept: ORTHOPEDIC SURGERY | Age: 84
End: 2019-01-07

## 2019-01-07 NOTE — PROGRESS NOTES
Order and office notes faxed to [facility] Scheduling to have them set up MRI L-Spine and to notify the patient of date. They will authorize with the insurance if needed. Patient aware.

## 2019-02-08 ENCOUNTER — OFFICE VISIT (OUTPATIENT)
Dept: ORTHOPEDIC SURGERY | Age: 84
End: 2019-02-08

## 2019-02-08 VITALS
SYSTOLIC BLOOD PRESSURE: 155 MMHG | RESPIRATION RATE: 16 BRPM | HEART RATE: 76 BPM | TEMPERATURE: 98 F | DIASTOLIC BLOOD PRESSURE: 70 MMHG | OXYGEN SATURATION: 99 %

## 2019-02-08 DIAGNOSIS — M54.16 LUMBAR RADICULOPATHY: ICD-10-CM

## 2019-02-08 DIAGNOSIS — M51.26 HNP (HERNIATED NUCLEUS PULPOSUS), LUMBAR: ICD-10-CM

## 2019-02-08 DIAGNOSIS — Z98.890 S/P LUMBAR LAMINECTOMY: Primary | ICD-10-CM

## 2019-02-08 RX ORDER — TOPIRAMATE 25 MG/1
25 TABLET ORAL 2 TIMES DAILY
Qty: 60 TAB | Refills: 1 | Status: CANCELLED | OUTPATIENT
Start: 2019-02-08

## 2019-02-08 RX ORDER — DULOXETIN HYDROCHLORIDE 20 MG/1
20 CAPSULE, DELAYED RELEASE ORAL DAILY
Qty: 30 CAP | Refills: 1 | Status: SHIPPED | OUTPATIENT
Start: 2019-02-08 | End: 2019-03-21

## 2019-02-08 NOTE — PROGRESS NOTES
[facility] 
[address], [address] [place], [address] [place] [address] Phone: [phone] Fax: [phone] PROGRESS NOTE Patient: Ja Lyon                MRN: [id number]       SSN: [id number] YOB: 1932        AGE: 80 y.o. SEX: male There is no height or weight on file to calculate BMI. PCP: Zay Bright MD 
02/08/19 No chief complaint on file. HISTORY OF PRESENT ILLNESS, RADIOGRAPHS, and PLAN:  
 
HISTORY OF PRESENT ILLNESS:  Mr. Aries Bell returns today. He is continuing to have this left leg pain. No clear dermatomal level, but at low lumbar. I reviewed his MRI. It demonstrates his post-laminectomy changes at L3-L4 with resolution of that stenosis. Continued chronic stenosis at L4-L5 that is sort of an autofused segment that has chronic stenosis and his post-laminectomy changes at L5-S1. I do not see anything new. ASSESSMENT/PLAN:  He is miserable, but he is in his [de-identified]. I do not understand what has caused this new pain. It has sort of a neuropathic quality to it. He has gone off all his neuropathic medications since his surgery. My clinical sense is that what is suffering from is a rebound neuropathic pain from stopping all his neuropathics following his decompression. His radiculopathy got much better, and he stopped that. 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 The gabapentin gave him cognitive issues. I have told him to restart his Cymbalta and see how he does. I am hesitant to want to put him through another decompression. I could decompress L3-L4 on the left and L4-L5 bilaterally, but he is 86 and I just find it unlikely that it is going to bring him the relief that he is looking for, but if we do not have any other choice, that is what we are looking at. I will see him back in 4 weeks. cc:  Dr. Karen Lebron Past Medical History: Diagnosis Date  BPH (benign prostatic hyperplasia)  Diabetes ([facility] [address] [facility]  ED (erectile dysfunction)  Hypertension  Nausea & vomiting History reviewed. No pertinent family history. Current Outpatient Medications Medication Sig Dispense Refill  meloxicam (MOBIC) 15 mg tablet TAKE 1 TAB DAILY W/ FOOD AS NEEDED FOR BACK PAIN (DO NOT USE ASPIRIN/IBUPROFEN, MAY USE TYLENOL)  2  
 dutasteride (AVODART) 0.5 mg capsule Take 1 Cap by mouth daily. (Patient taking differently: Take 0.5 mg by mouth every seven (7) days.) 90 Cap 3  
 saw palmetto 500 mg cap Take  by mouth. Indications: 2 at noon and 2 at dinner  lisinopril (PRINIVIL, ZESTRIL) 40 mg tablet Take 40 mg by mouth daily.  amLODIPine (NORVASC) 5 mg tablet Take 5 mg by mouth daily.  glipiZIDE SR (GLUCOTROL) 2.5 mg CR tablet Take  by mouth two (2) times a day.  metFORMIN (GLUCOPHAGE) 500 mg tablet Take  by mouth two (2) times daily (with meals).  esomeprazole (NEXIUM) 20 mg capsule Take  by mouth daily.  aspirin delayed-release 81 mg tablet Take  by mouth daily.  multivitamin with iron (DAILY MULTI-VITAMINS/IRON) tablet Take 1 tablet by mouth daily.  VITAMIN E, DL,TOCOPHERYL ACET, (VITAMIN E ACETATE) 400 unit cap capsule Take  by mouth daily.  cinnamon bark (CINNAMON) 500 mg cap Take  by mouth two (2) times a day.  gabapentin (NEURONTIN) 100 mg capsule Take 1 Cap by mouth three (3) times daily. 90 Cap 3  
 DULoxetine (CYMBALTA) 20 mg capsule TAKE 1 CAPSULE BY MOUTH DAILY FOR NEUROPATHIC PAIN 30 Cap 1  
 traMADol (ULTRAM) 50 mg tablet Take 1 Tab by mouth every eight (8) hours as needed for Pain. Max Daily Amount: 150 mg. 20 Tab 0  
 diclofenac (VOLTAREN) 1 % gel  traMADol (ULTRAM) 50 mg tablet Take 1 Tab by mouth every four (4) hours as needed. Max Daily Amount: 300 mg. 48 Tab 0 Allergies Allergen Reactions  Sulfa (Sulfonamide Antibiotics) Other (comments) Patient states not allergic Past Surgical History:  
Procedure Laterality Date  HX BACK SURGERY  10/15/2018  HX CATARACT REMOVAL    
 HX ORTHOPAEDIC    
 back surgery  HX SHOULDER REPLACEMENT Left 2016  HX TURP Past Medical History:  
Diagnosis Date  BPH (benign prostatic hyperplasia)  Diabetes ([address]  ED (erectile dysfunction)  Hypertension  Nausea & vomiting Social History Socioeconomic History  Marital status:  Spouse name: Not on file  Number of children: Not on file  Years of education: Not on file  Highest education level: Not on file Social Needs  Financial resource strain: Not on file  Food insecurity - worry: Not on file  Food insecurity - inability: Not on file  Transportation needs - medical: Not on file  Transportation needs - non-medical: Not on file Occupational History  Not on file Tobacco Use  Smoking status: Former Smoker Types: Cigars  Smokeless tobacco: Never Used Substance and Sexual Activity  Alcohol use: No  
 Drug use: No  
 Sexual activity: Not on file Other Topics Concern [address] Service Not Asked  Blood Transfusions Not Asked  Caffeine Concern Not Asked  Occupational Exposure Not Asked [place] Hazards Not Asked  Sleep Concern Not Asked  Stress Concern Not Asked  Weight Concern Not Asked  Special Diet Not Asked  Back Care Not Asked  Exercise Not Asked  Bike Helmet Not Asked  Seat Belt Not Asked  Self-Exams Not Asked Social History Narrative  Not on file REVIEW OF SYSTEMS: 
 CONSTITUTIONAL SYMPTOMS:  Negative. EYES:  Negative. EARS, NOSE, THROAT AND MOUTH:  Negative. CARDIOVASCULAR:  Negative. RESPIRATORY:  Negative. GENITOURINARY: Per HPI. GASTROINTESTINAL:  Per HPI. INTEGUMENTARY (SKIN AND/OR BREAST):  Negative. MUSCULOSKELETAL: Per HPI. ENDOCRINE/RHEUMATOLOGIC:  Negative. NEUROLOGICAL:  Per HPI. HEMATOLOGIC/LYMPHATIC:  Negative. ALLERGIC/IMMUNOLOGIC:  Negative. PSYCHIATRIC:  Negative. PHYSICAL EXAMINATION:  
Visit Vitals /70 Pulse 76 Temp 98 °F (36.7 °C) Resp 16 SpO2 99% PAIN SCALE: 9/10 CONSTITUTIONAL: The patient is in no apparent distress and is alert and oriented x 3. HEENT: Normocephalic. Hearing grossly intact. NECK: Supple and symmetric. no tenderness, or masses were felt. RESPIRATORY: No labored breathing. CARDIOVASCULAR: The carotid pulses were normal. Peripheral pulses were 2+. CHEST: Normal AP diameter and normal contour without any kyphoscoliosis. LYMPHATIC: No lymphadenopathy was appreciated in the neck, axillae or groin. SKIN:  Incision healing well, no drainage, no erythema, no hernia, no seroma, no swelling, no dehiscence, incision well approximated. Negative for scars, rashes, lesions, or ulcers on the right upper, right lower, left upper, left lower and trunk. NEUROLOGICAL: Alert and oriented x 3. Ambulation without assistive device. FWB. EXTREMITIES: See musculoskeletal. 
MUSCULOSKELETAL: 
? Head and Neck:  Negative for misalignment, asymmetry, crepitation, defects, tenderness masses or effusions. ? Left Upper Extremity: Inspection, percussion and palpation performed. Cowarts sign is negative. ? Right Upper Extremity: Inspection, percussion and palpation performed. Cowarts sign is negative. ? Spine, Ribs and Pelvis: Low back pain radiating in to LLE. Inspection, percussion and palpation performed. Negative for misalignment, asymmetry, crepitation, defects, tenderness masses or effusions. ? Left Lower Extremity: Aching pain down leg. Inspection, percussion and palpation performed. Negative straight leg raise. ? Right Lower Extremity: Inspection, percussion and palpation performed. Negative straight leg raise. SPINE EXAM:  
 
Lumbar spine: No rash, ecchymosis, or gross obliquity.  No focal atrophy is noted.  
 
 
ASSESSMENT 
  ICD-10-CM ICD-9-CM 1. S/P lumbar laminectomy Z98.890 V45.89   
2. HNP (herniated nucleus pulposus), lumbar M51.26 722.10 Written by Saman Gan, as dictated by Marsha King MD. 
 
I, Dr. Marsha King MD, confirm that all documentation is accurate.

## 2019-03-12 ENCOUNTER — OFFICE VISIT (OUTPATIENT)
Dept: ORTHOPEDIC SURGERY | Age: 84
End: 2019-03-12

## 2019-03-12 VITALS
WEIGHT: 182.4 LBS | HEART RATE: 76 BPM | BODY MASS INDEX: 27.73 KG/M2 | SYSTOLIC BLOOD PRESSURE: 142 MMHG | RESPIRATION RATE: 16 BRPM | DIASTOLIC BLOOD PRESSURE: 71 MMHG | OXYGEN SATURATION: 98 % | TEMPERATURE: 97.8 F

## 2019-03-12 DIAGNOSIS — Z98.890 S/P LUMBAR LAMINECTOMY: ICD-10-CM

## 2019-03-12 DIAGNOSIS — M48.062 LUMBAR STENOSIS WITH NEUROGENIC CLAUDICATION: Primary | ICD-10-CM

## 2019-03-12 NOTE — PROGRESS NOTES
[facility] [address]  [address], [address]  [place], [address] [place] [address] Street  Phone: [phone]  Fax: [phone]  PROGRESS NOTE  Patient: Maxwell Zamora                MRN: [id number]       SSN: [id number]  YOB: 1932        AGE: 80 y.o. SEX: male  Body mass index is 27.73 kg/m². PCP: Benji Li MD  03/12/19    Chief Complaint   Patient presents with    Back Pain     4 week f/u       HISTORY OF PRESENT ILLNESS, RADIOGRAPHS, and PLAN:     HISTORY OF PRESENT ILLNESS:  Mr. Washington Hernandez returns today. He is continuing to have a 9/10 pain in his left back, buttock and down his left leg. His right leg is asymptomatic. He tried neuropathics and they did not help. His pain is present. He has a hard time walking and weight bearing. He has no hip symptoms. No nerve tension signs. I reviewed his MRI again with him. He has chronic foraminal stenosis at all 3 levels with lateral recess stenosis at L4-L5. The radiculopathy he was having on the right that was acute when I saw him initially is gone following his right-sided laminotomy, but now he has recent onset of his left-sided radiculopathy that is hard for me to pin point the level that it is coming from, but he finds it untenable, as he says he cannot live like this. Medications have been ineffective. Injections have been ineffective in the past.  He would like to be fixed. ASSESSMENT/PLAN:  We discussed the nature of the pathology and the uncertainties. It is hard for me to pin point. It does not seem to be a classic S1 radiculopathy. It courses more lateral on his leg. Probably L4-L5 is most likely the level involved, just from his description of the distribution, and that is where I would focus my attention, but L3-L4 and L5-S1 cannot be ignored. I discussed with him the uncertainties, the risks, benefits, complications and alternatives to surgery. He wishes to proceed.   We will proceed with surgery once the appropriate approvals and clearances take place. cc:  Meera Wilson MD           Past Medical History:   Diagnosis Date    BPH (benign prostatic hyperplasia)     Diabetes ([address]     ED (erectile dysfunction)     Hypertension     Nausea & vomiting        History reviewed. No pertinent family history. Current Outpatient Medications   Medication Sig Dispense Refill    dutasteride (AVODART) 0.5 mg capsule Take 1 Cap by mouth daily. 90 Cap 3    DULoxetine (CYMBALTA) 20 mg capsule Take 1 Cap by mouth daily. 30 Cap 1    gabapentin (NEURONTIN) 100 mg capsule Take 1 Cap by mouth three (3) times daily. 90 Cap 3    meloxicam (MOBIC) 15 mg tablet TAKE 1 TAB DAILY W/ FOOD AS NEEDED FOR BACK PAIN (DO NOT USE ASPIRIN/IBUPROFEN, MAY USE TYLENOL)  2    diclofenac (VOLTAREN) 1 % gel       traMADol (ULTRAM) 50 mg tablet Take 1 Tab by mouth every four (4) hours as needed. Max Daily Amount: 300 mg. 48 Tab 0    saw palmetto 500 mg cap Take  by mouth. Indications: 2 at noon and 2 at dinner      lisinopril (PRINIVIL, ZESTRIL) 40 mg tablet Take 40 mg by mouth daily.  amLODIPine (NORVASC) 5 mg tablet Take 5 mg by mouth daily.  glipiZIDE SR (GLUCOTROL) 2.5 mg CR tablet Take  by mouth two (2) times a day.  metFORMIN (GLUCOPHAGE) 500 mg tablet Take  by mouth two (2) times daily (with meals).  esomeprazole (NEXIUM) 20 mg capsule Take  by mouth daily.  aspirin delayed-release 81 mg tablet Take  by mouth daily.  multivitamin with iron (DAILY MULTI-VITAMINS/IRON) tablet Take 1 tablet by mouth daily.  VITAMIN E, DL,TOCOPHERYL ACET, (VITAMIN E ACETATE) 400 unit cap capsule Take  by mouth daily.  cinnamon bark (CINNAMON) 500 mg cap Take  by mouth two (2) times a day.          Allergies   Allergen Reactions    Sulfa (Sulfonamide Antibiotics) Other (comments)     Patient states not allergic       Past Surgical History:   Procedure Laterality Date    HX BACK SURGERY 10/15/2018    HX CATARACT REMOVAL      HX ORTHOPAEDIC      back surgery    HX SHOULDER REPLACEMENT Left 2016    HX TURP         Past Medical History:   Diagnosis Date    BPH (benign prostatic hyperplasia)     Diabetes ([address]     ED (erectile dysfunction)     Hypertension     Nausea & vomiting        Social History     Socioeconomic History    Marital status:      Spouse name: Not on file    Number of children: Not on file    Years of education: Not on file    Highest education level: Not on file   Social Needs    Financial resource strain: Not on file    Food insecurity - worry: Not on file    Food insecurity - inability: Not on file   [employer] needs - medical: Not on file   [employer] needs - non-medical: Not on file   Occupational History    Not on file   Tobacco Use    Smoking status: Former Smoker     Types: Cigars    Smokeless tobacco: Never Used   Substance and Sexual Activity    Alcohol use: No    Drug use: No    Sexual activity: Not on file   Other Topics Concern     Service Not Asked    Blood Transfusions Not Asked    Caffeine Concern Not Asked    Occupational Exposure Not Asked    Hobby Hazards Not Asked    Sleep Concern Not Asked    Stress Concern Not Asked    Weight Concern Not Asked    Special Diet Not Asked    Back Care Not Asked    Exercise Not Asked    Bike Helmet Not Asked   [address] Not Asked    Self-Exams Not Asked   Social History Narrative    Not on file         REVIEW OF SYSTEMS:   CONSTITUTIONAL SYMPTOMS:  Negative. EYES:  Negative. EARS, NOSE, THROAT AND MOUTH:  Negative. CARDIOVASCULAR:  Negative. RESPIRATORY:  Negative. GENITOURINARY: Per HPI. GASTROINTESTINAL:  Per HPI. INTEGUMENTARY (SKIN AND/OR BREAST):  Negative. MUSCULOSKELETAL: Per HPI.   ENDOCRINE/RHEUMATOLOGIC:  Negative. NEUROLOGICAL:  Per HPI. HEMATOLOGIC/LYMPHATIC:  Negative. ALLERGIC/IMMUNOLOGIC:  Negative.    PSYCHIATRIC: Negative. PHYSICAL EXAMINATION:   Visit Vitals  /71   Pulse 76   Temp 97.8 °F (36.6 °C)   Resp 16   Wt 182 lb 6.4 oz (82.7 kg)   SpO2 98%   BMI 27.73 kg/m²    PAIN SCALE: 9/10    CONSTITUTIONAL: The patient is in no apparent distress and is alert and oriented x 3. HEENT: Normocephalic. Hearing grossly intact. NECK: Supple and symmetric. no tenderness, or masses were felt. RESPIRATORY: No labored breathing. CARDIOVASCULAR: The carotid pulses were normal. Peripheral pulses were 2+. CHEST: Normal AP diameter and normal contour without any kyphoscoliosis. LYMPHATIC: No lymphadenopathy was appreciated in the neck, axillae or groin. SKIN:   Negative for scars, rashes, lesions, or ulcers on the right upper, right lower, left upper, left lower and trunk. NEUROLOGICAL: Alert and oriented x 3. Ambulation without assistive device. FWB. EXTREMITIES: See musculoskeletal.  MUSCULOSKELETAL:   Head and Neck:  Negative for misalignment, asymmetry, crepitation, defects, tenderness masses or effusions.  Left Upper Extremity: Inspection, percussion and palpation performed. Cowarts sign is negative.  Right Upper Extremity: Inspection, percussion and palpation performed. Cowarts sign is negative.  Spine, Ribs and Pelvis: Back pain. Inspection, percussion and palpation performed. Negative for misalignment, asymmetry, crepitation, defects, tenderness masses or effusions.  Left Lower Extremity: Aching pain down LLE. Inspection, percussion and palpation performed. Negative straight leg raise.  Right Lower Extremity: Inspection, percussion and palpation performed. Negative straight leg raise. SPINE EXAM:     Lumbar spine: No rash, ecchymosis, or gross obliquity. No focal atrophy is noted. ASSESSMENT    ICD-10-CM ICD-9-CM    1. Lumbar stenosis with neurogenic claudication M48.062 724.03    2.  S/P lumbar laminectomy Y02.059 V45.89        Written by Natalie Lim, as dictated by Chris Ledesma MD.    I, Dr. Chris Ledesma MD, confirm that all documentation is accurate.

## 2019-03-12 NOTE — PROGRESS NOTES
[facility]   Pre-Surgical Worksheet    Patient: Alcon Rdz                         MRN: [id number]     Age:  80 y.o.,      Sex: male    YOB: 1932           SOL: March 12, 2019  PCP: Rosslyn Boxer, MD    Allergies   Allergen Reactions    Sulfa (Sulfonamide Antibiotics) Other (comments)     Patient states not allergic         ICD-10-CM ICD-9-CM    1. Lumbar stenosis with neurogenic claudication M48.062 724.03    2. S/P lumbar laminectomy [id number] V45.89        Surgery: Left L3/4. L4/5 L5/S1 Lami. Pain Assessment   Pain Assessment  3/12/2019   Location of Pain Back;Leg   Location Modifiers Left   Severity of Pain 9   Quality of Pain Aching; Other (Comment)   Quality of Pain Comment Numbness from back down left leg to calf   Duration of Pain Persistent   Frequency of Pain Constant   Aggravating Factors Other (Comment)   Aggravating Factors Comment Sitting   Limiting Behavior Yes   Relieving Factors Nothing   Relieving Factors Comment -   Result of Injury No       Visit Vitals  /71   Pulse 76   Temp 97.8 °F (36.6 °C)   Resp 16   Wt 182 lb 6.4 oz (82.7 kg)   SpO2 98%   BMI 27.73 kg/m²       ADL Limits: Cane    Spine Surgery?: Yes When:10/2018. Where: [facility] Spinal Injections?: Yes  When: 2018. Where: [facility]. Physical Therapy?: No.    NSAID's?: Yes    Pain Medications?: No.    In Pain Management: YES, Where: Dr. Mellie Bernheim    Current Outpatient Medications   Medication Sig    dutasteride (AVODART) 0.5 mg capsule Take 1 Cap by mouth daily.  DULoxetine (CYMBALTA) 20 mg capsule Take 1 Cap by mouth daily.  gabapentin (NEURONTIN) 100 mg capsule Take 1 Cap by mouth three (3) times daily.     meloxicam (MOBIC) 15 mg tablet TAKE 1 TAB DAILY W/ FOOD AS NEEDED FOR BACK PAIN (DO NOT USE ASPIRIN/IBUPROFEN, MAY USE TYLENOL)    diclofenac (VOLTAREN) 1 % gel     traMADol (ULTRAM) 50 mg tablet Take 1 Tab by mouth every four (4) hours as needed. Max Daily Amount: 300 mg.    saw palmetto 500 mg cap Take  by mouth. Indications: 2 at noon and 2 at dinner    lisinopril (PRINIVIL, ZESTRIL) 40 mg tablet Take 40 mg by mouth daily.  amLODIPine (NORVASC) 5 mg tablet Take 5 mg by mouth daily.  glipiZIDE SR (GLUCOTROL) 2.5 mg CR tablet Take  by mouth two (2) times a day.  metFORMIN (GLUCOPHAGE) 500 mg tablet Take  by mouth two (2) times daily (with meals).  esomeprazole (NEXIUM) 20 mg capsule Take  by mouth daily.  aspirin delayed-release 81 mg tablet Take  by mouth daily.  multivitamin with iron (DAILY MULTI-VITAMINS/IRON) tablet Take 1 tablet by mouth daily.  VITAMIN E, DL,TOCOPHERYL ACET, (VITAMIN E ACETATE) 400 unit cap capsule Take  by mouth daily.  cinnamon bark (CINNAMON) 500 mg cap Take  by mouth two (2) times a day. No current facility-administered medications for this visit.         Past Medical History:   Diagnosis Date    BPH (benign prostatic hyperplasia)     Diabetes ([address]     ED (erectile dysfunction)     Hypertension     Nausea & vomiting        Past Surgical History:   Procedure Laterality Date    HX BACK SURGERY  10/15/2018    HX CATARACT REMOVAL      HX ORTHOPAEDIC      back surgery    HX SHOULDER REPLACEMENT Left 2016    HX TURP         Social History     Socioeconomic History    Marital status:      Spouse name: Not on file    Number of children: Not on file    Years of education: Not on file    Highest education level: Not on file   Tobacco Use    Smoking status: Former Smoker     Types: Cigars    Smokeless tobacco: Never Used   Substance and Sexual Activity    Alcohol use: No    Drug use: No   Other Topics Concern

## 2019-03-26 NOTE — H&P
Pre-Admission History and Physical 
 
Patient: Leandra Heredia   MRN: [id number]   SSN: [id number] YOB: 1932   Age: 80 y.o. Sex: male Patient scheduled for: Left L3/4, 4/5, 5/S1 Lami. Date of surgery: 4/1/19. Location of surgery: [facility]. Surgeon: Ishan Aguilera MD 
 
HPI:  Leandra Heredia is a 80 y.o. male with continuing to have a 9/10 pain in his left back, buttock and down his left leg. His right leg is asymptomatic. He tried neuropathics and they did not help. His pain is present. He has a hard time walking and weight bearing. He has no hip symptoms. No nerve tension signs. He reports a pain level of 9/10. MRI demonstrates  his post-laminectomy changes at L3-L4 with resolution of that stenosis. Continued chronic stenosis at L4-L5 that is sort of an autofused segment that has chronic stenosis and his post-laminectomy changes at L5-S1. I do not see anything new. This patient has failed the presurgical conservative treatments  including, spinal block injections and medications. Pain has impacted the patient's functional ability to ambulate, he now hast to use a cane and he is being admitted for surgical intervention. Past Medical History:  
Diagnosis Date  BPH (benign prostatic hyperplasia)  Diabetes ([address]  ED (erectile dysfunction)  GERD (gastroesophageal reflux disease)  Hypertension  Nausea & vomiting Social History Socioeconomic History  Marital status:  Spouse name: Not on file  Number of children: Not on file  Years of education: Not on file  Highest education level: Not on file Tobacco Use  Smoking status: Former Smoker Types: Cigars  Smokeless tobacco: Never Used Substance and Sexual Activity  Alcohol use: No  
 Drug use: No  
Other Topics Concern Past Surgical History:  
Procedure Laterality Date  HX BACK SURGERY  10/15/2018  HX CATARACT REMOVAL    
  HX ORTHOPAEDIC    
 back surgery  HX SHOULDER REPLACEMENT Left 2016  HX TURP History reviewed. No pertinent family history. Allergies Allergen Reactions  Sulfa (Sulfonamide Antibiotics) Other (comments) Patient states not allergic Current Outpatient Medications Medication Sig Dispense Refill  dutasteride (AVODART) 0.5 mg capsule Take 1 Cap by mouth daily. (Patient taking differently: Take 0.5 mg by mouth daily. To take on Monday evenings only-per patient) 90 Cap 3  
 gabapentin (NEURONTIN) 100 mg capsule Take 1 Cap by mouth three (3) times daily. (Patient taking differently: Take 100 mg by mouth three (3) times daily. Patient taking as needed) 90 Cap 3  
 saw palmetto 500 mg cap Take 450 mg by mouth. Indications: 2 at noon and 2 at dinner  lisinopril (PRINIVIL, ZESTRIL) 40 mg tablet Take 40 mg by mouth daily.  amLODIPine (NORVASC) 5 mg tablet Take 5 mg by mouth daily.  glipiZIDE SR (GLUCOTROL) 2.5 mg CR tablet Take  by mouth two (2) times a day.  metFORMIN (GLUCOPHAGE) 500 mg tablet Take  by mouth two (2) times daily (with meals).  esomeprazole (NEXIUM) 20 mg capsule Take  by mouth daily.  multivitamin with iron (DAILY MULTI-VITAMINS/IRON) tablet Take 1 tablet by mouth daily.  VITAMIN E, DL,TOCOPHERYL ACET, (VITAMIN E ACETATE) 400 unit cap capsule Take  by mouth daily. Takes at noon  cinnamon bark (CINNAMON) 500 mg cap Take  by mouth two (2) times a day. Takes one tablet at noon and one tablet at dinner  aspirin delayed-release 81 mg tablet Take  by mouth nightly. ROS:  Denies chills, fever,night sweats,  bowel or bladder dysfunction, unexplained weight loss/weight gain, chest pain, sob or anxiety. Physical Examination Gen: Well developed, well nourished 80 y.o. male Visit Vitals /71 Pulse 76 Temp 97.8 °F (36.6 °C) Resp 16 Wt 182 lb 6.4 oz (82.7 kg) SpO2 98% BMI 27.73 kg/m²  PAIN SCALE: 9/10 
  
 CONSTITUTIONAL: The patient is in no apparent distress and is alert and oriented x 3. HEENT: Normocephalic. Hearing grossly intact. NECK: Supple and symmetric. no tenderness, or masses were felt. RESPIRATORY: No labored breathing. CARDIOVASCULAR: The carotid pulses were normal. Peripheral pulses were 2+. CHEST: Normal AP diameter and normal contour without any kyphoscoliosis. LYMPHATIC: No lymphadenopathy was appreciated in the neck, axillae or groin. SKIN:   Negative for scars, rashes, lesions, or ulcers on the right upper, right lower, left upper, left lower and trunk. NEUROLOGICAL: Alert and oriented x 3. Ambulation without assistive device. FWB. EXTREMITIES: See musculoskeletal. 
MUSCULOSKELETAL: 
· Head and Neck:  Negative for misalignment, asymmetry, crepitation, defects, tenderness masses or effusions. · Left Upper Extremity: Inspection, percussion and palpation performed. Cowarts sign is negative. · Right Upper Extremity: Inspection, percussion and palpation performed. Cowarts sign is negative. · Spine, Ribs and Pelvis: Back pain. Inspection, percussion and palpation performed. Negative for misalignment, asymmetry, crepitation, defects, tenderness masses or effusions. · Left Lower Extremity: Aching pain down LLE. Inspection, percussion and palpation performed. Negative straight leg raise. · Right Lower Extremity: Inspection, percussion and palpation performed. Negative straight leg raise. 
  
  
  
SPINE EXAM:  
  
Lumbar spine: No rash, ecchymosis, or gross obliquity. No focal atrophy is noted. Assessment and Plan Due to the pt's persistent symptoms unrelieved by conservative measure Charmayne Cheeks is being admitted to [facility] to undergo surgical intervention. The post-operative plan of care consists of physical therapy, home health and a 2 week f/u office visit. We are pending medical clearance by Dr. Aries Latif.   The risks, benefits, complications and alternatives to surgery have been discussed in detail with the patient. The patient understands and agrees to proceed.   
 
Tyron Berry NP-BC dictating for Ishan Aguilera MD

## 2019-03-30 ENCOUNTER — ANESTHESIA EVENT (OUTPATIENT)
Dept: SURGERY | Age: 84
End: 2019-03-30
Payer: MEDICARE

## 2019-04-01 ENCOUNTER — APPOINTMENT (OUTPATIENT)
Dept: GENERAL RADIOLOGY | Age: 84
End: 2019-04-01
Attending: ORTHOPAEDIC SURGERY
Payer: MEDICARE

## 2019-04-01 ENCOUNTER — HOSPITAL ENCOUNTER (OUTPATIENT)
Age: 84
Setting detail: OBSERVATION
Discharge: HOME | End: 2019-04-02
Attending: ORTHOPAEDIC SURGERY | Admitting: ORTHOPAEDIC SURGERY
Payer: MEDICARE

## 2019-04-01 ENCOUNTER — ANESTHESIA (OUTPATIENT)
Dept: SURGERY | Age: 84
End: 2019-04-01
Payer: MEDICARE

## 2019-04-01 DIAGNOSIS — Z98.890 S/P LUMBAR LAMINECTOMY: Primary | ICD-10-CM

## 2019-04-01 PROBLEM — M48.00 SPINAL STENOSIS: Status: ACTIVE | Noted: 2019-04-01

## 2019-04-01 LAB
ATRIAL RATE: 66 BPM
BASOPHILS # BLD: 0 K/UL (ref 0–0.1)
BASOPHILS NFR BLD: 0 % (ref 0–2)
CALCULATED P AXIS, ECG09: 35 DEGREES
CALCULATED R AXIS, ECG10: -26 DEGREES
CALCULATED T AXIS, ECG11: 16 DEGREES
DIAGNOSIS, 93000: NORMAL
DIFFERENTIAL METHOD BLD: NORMAL
EOSINOPHIL # BLD: 0.1 K/UL (ref 0–0.4)
EOSINOPHIL NFR BLD: 1 % (ref 0–5)
ERYTHROCYTE [DISTWIDTH] IN BLOOD BY AUTOMATED COUNT: 14.3 % (ref 11.6–14.5)
GLUCOSE BLD STRIP.AUTO-MCNC: 118 MG/DL (ref 70–110)
GLUCOSE BLD STRIP.AUTO-MCNC: 132 MG/DL (ref 70–110)
GLUCOSE BLD STRIP.AUTO-MCNC: 138 MG/DL (ref 70–110)
GLUCOSE BLD STRIP.AUTO-MCNC: 226 MG/DL (ref 70–110)
HCT VFR BLD AUTO: 41.3 % (ref 36–48)
HGB BLD-MCNC: 14.2 G/DL (ref 13–16)
LYMPHOCYTES # BLD: 2.7 K/UL (ref 0.9–3.6)
LYMPHOCYTES NFR BLD: 38 % (ref 21–52)
MCH RBC QN AUTO: 29.9 PG (ref 24–34)
MCHC RBC AUTO-ENTMCNC: 34.4 G/DL (ref 31–37)
MCV RBC AUTO: 86.9 FL (ref 74–97)
MONOCYTES # BLD: 0.7 K/UL (ref 0.05–1.2)
MONOCYTES NFR BLD: 9 % (ref 3–10)
NEUTS SEG # BLD: 3.6 K/UL (ref 1.8–8)
NEUTS SEG NFR BLD: 52 % (ref 40–73)
P-R INTERVAL, ECG05: 156 MS
PLATELET # BLD AUTO: 249 K/UL (ref 135–420)
PMV BLD AUTO: 10.5 FL (ref 9.2–11.8)
Q-T INTERVAL, ECG07: 430 MS
QRS DURATION, ECG06: 92 MS
QTC CALCULATION (BEZET), ECG08: 450 MS
RBC # BLD AUTO: 4.75 M/UL (ref 4.7–5.5)
VENTRICULAR RATE, ECG03: 66 BPM
WBC # BLD AUTO: 7 K/UL (ref 4.6–13.2)

## 2019-04-01 PROCEDURE — 74011250636 HC RX REV CODE- 250/636: Performed by: NURSE PRACTITIONER

## 2019-04-01 PROCEDURE — 74011250637 HC RX REV CODE- 250/637: Performed by: ORTHOPAEDIC SURGERY

## 2019-04-01 PROCEDURE — 74011250636 HC RX REV CODE- 250/636: Performed by: ORTHOPAEDIC SURGERY

## 2019-04-01 PROCEDURE — 93005 ELECTROCARDIOGRAM TRACING: CPT

## 2019-04-01 PROCEDURE — 76210000006 HC OR PH I REC 0.5 TO 1 HR: Performed by: ORTHOPAEDIC SURGERY

## 2019-04-01 PROCEDURE — 74011000250 HC RX REV CODE- 250

## 2019-04-01 PROCEDURE — 77030032490 HC SLV COMPR SCD KNE COVD -B: Performed by: ORTHOPAEDIC SURGERY

## 2019-04-01 PROCEDURE — 77030027138 HC INCENT SPIROMETER -A: Performed by: ORTHOPAEDIC SURGERY

## 2019-04-01 PROCEDURE — 74011250636 HC RX REV CODE- 250/636: Performed by: NURSE ANESTHETIST, CERTIFIED REGISTERED

## 2019-04-01 PROCEDURE — 74011250636 HC RX REV CODE- 250/636

## 2019-04-01 PROCEDURE — 99218 HC RM OBSERVATION: CPT

## 2019-04-01 PROCEDURE — 77030018836 HC SOL IRR NACL ICUM -A: Performed by: ORTHOPAEDIC SURGERY

## 2019-04-01 PROCEDURE — 77030004402 HC BUR NEUR STRY -C: Performed by: ORTHOPAEDIC SURGERY

## 2019-04-01 PROCEDURE — 77030020782 HC GWN BAIR PAWS FLX 3M -B: Performed by: ORTHOPAEDIC SURGERY

## 2019-04-01 PROCEDURE — 82962 GLUCOSE BLOOD TEST: CPT

## 2019-04-01 PROCEDURE — 97161 PT EVAL LOW COMPLEX 20 MIN: CPT

## 2019-04-01 PROCEDURE — 85025 COMPLETE CBC W/AUTO DIFF WBC: CPT

## 2019-04-01 PROCEDURE — 77030026438 HC STYL ET INTUB CARD -A: Performed by: ANESTHESIOLOGY

## 2019-04-01 PROCEDURE — 76010000153 HC OR TIME 1.5 TO 2 HR: Performed by: ORTHOPAEDIC SURGERY

## 2019-04-01 PROCEDURE — 77030030163 HC BN WAX J&J -A: Performed by: ORTHOPAEDIC SURGERY

## 2019-04-01 PROCEDURE — 74011000272 HC RX REV CODE- 272: Performed by: ORTHOPAEDIC SURGERY

## 2019-04-01 PROCEDURE — 77030012893: Performed by: ORTHOPAEDIC SURGERY

## 2019-04-01 PROCEDURE — 77030013079 HC BLNKT BAIR HGGR 3M -A: Performed by: ANESTHESIOLOGY

## 2019-04-01 PROCEDURE — 77030002933 HC SUT MCRYL J&J -A: Performed by: ORTHOPAEDIC SURGERY

## 2019-04-01 PROCEDURE — 76060000034 HC ANESTHESIA 1.5 TO 2 HR: Performed by: ORTHOPAEDIC SURGERY

## 2019-04-01 PROCEDURE — 77030027138 HC INCENT SPIROMETER -A

## 2019-04-01 PROCEDURE — 77030008683 HC TU ET CUF COVD -A: Performed by: ANESTHESIOLOGY

## 2019-04-01 PROCEDURE — 74011000250 HC RX REV CODE- 250: Performed by: NURSE ANESTHETIST, CERTIFIED REGISTERED

## 2019-04-01 PROCEDURE — 74011000250 HC RX REV CODE- 250: Performed by: ORTHOPAEDIC SURGERY

## 2019-04-01 PROCEDURE — 77030003029 HC SUT VCRL J&J -B: Performed by: ORTHOPAEDIC SURGERY

## 2019-04-01 PROCEDURE — 97116 GAIT TRAINING THERAPY: CPT

## 2019-04-01 RX ORDER — OXYCODONE HYDROCHLORIDE 5 MG/1
5-10 TABLET ORAL
Status: DISCONTINUED | OUTPATIENT
Start: 2019-04-01 | End: 2019-04-02 | Stop reason: HOSPADM

## 2019-04-01 RX ORDER — INSULIN LISPRO 100 [IU]/ML
INJECTION, SOLUTION INTRAVENOUS; SUBCUTANEOUS ONCE
Status: DISCONTINUED | OUTPATIENT
Start: 2019-04-01 | End: 2019-04-01 | Stop reason: HOSPADM

## 2019-04-01 RX ORDER — DIPHENHYDRAMINE HYDROCHLORIDE 50 MG/ML
12.5 INJECTION, SOLUTION INTRAMUSCULAR; INTRAVENOUS
Status: DISCONTINUED | OUTPATIENT
Start: 2019-04-01 | End: 2019-04-02 | Stop reason: HOSPADM

## 2019-04-01 RX ORDER — GLYCOPYRROLATE 0.2 MG/ML
INJECTION INTRAMUSCULAR; INTRAVENOUS AS NEEDED
Status: DISCONTINUED | OUTPATIENT
Start: 2019-04-01 | End: 2019-04-01 | Stop reason: HOSPADM

## 2019-04-01 RX ORDER — BUPIVACAINE HYDROCHLORIDE 5 MG/ML
INJECTION, SOLUTION EPIDURAL; INTRACAUDAL AS NEEDED
Status: DISCONTINUED | OUTPATIENT
Start: 2019-04-01 | End: 2019-04-01 | Stop reason: HOSPADM

## 2019-04-01 RX ORDER — DIPHENHYDRAMINE HCL 25 MG
25 CAPSULE ORAL
Status: DISCONTINUED | OUTPATIENT
Start: 2019-04-01 | End: 2019-04-02 | Stop reason: HOSPADM

## 2019-04-01 RX ORDER — SUCCINYLCHOLINE CHLORIDE 20 MG/ML
INJECTION INTRAMUSCULAR; INTRAVENOUS AS NEEDED
Status: DISCONTINUED | OUTPATIENT
Start: 2019-04-01 | End: 2019-04-01 | Stop reason: HOSPADM

## 2019-04-01 RX ORDER — SODIUM CHLORIDE 0.9 % (FLUSH) 0.9 %
5-40 SYRINGE (ML) INJECTION AS NEEDED
Status: DISCONTINUED | OUTPATIENT
Start: 2019-04-01 | End: 2019-04-01 | Stop reason: HOSPADM

## 2019-04-01 RX ORDER — AMLODIPINE BESYLATE 5 MG/1
5 TABLET ORAL DAILY
Status: DISCONTINUED | OUTPATIENT
Start: 2019-04-02 | End: 2019-04-02 | Stop reason: HOSPADM

## 2019-04-01 RX ORDER — NEOSTIGMINE METHYLSULFATE 5 MG/5 ML
SYRINGE (ML) INTRAVENOUS AS NEEDED
Status: DISCONTINUED | OUTPATIENT
Start: 2019-04-01 | End: 2019-04-01 | Stop reason: HOSPADM

## 2019-04-01 RX ORDER — LISINOPRIL 40 MG/1
40 TABLET ORAL DAILY
Status: DISCONTINUED | OUTPATIENT
Start: 2019-04-02 | End: 2019-04-02 | Stop reason: HOSPADM

## 2019-04-01 RX ORDER — SODIUM CHLORIDE 0.9 % (FLUSH) 0.9 %
5-40 SYRINGE (ML) INJECTION EVERY 8 HOURS
Status: DISCONTINUED | OUTPATIENT
Start: 2019-04-01 | End: 2019-04-01 | Stop reason: HOSPADM

## 2019-04-01 RX ORDER — FENTANYL CITRATE 50 UG/ML
INJECTION, SOLUTION INTRAMUSCULAR; INTRAVENOUS AS NEEDED
Status: DISCONTINUED | OUTPATIENT
Start: 2019-04-01 | End: 2019-04-01 | Stop reason: HOSPADM

## 2019-04-01 RX ORDER — ONDANSETRON 2 MG/ML
4 INJECTION INTRAMUSCULAR; INTRAVENOUS
Status: DISCONTINUED | OUTPATIENT
Start: 2019-04-01 | End: 2019-04-02 | Stop reason: HOSPADM

## 2019-04-01 RX ORDER — DEXTROSE 50 % IN WATER (D50W) INTRAVENOUS SYRINGE
25-50 AS NEEDED
Status: DISCONTINUED | OUTPATIENT
Start: 2019-04-01 | End: 2019-04-01 | Stop reason: HOSPADM

## 2019-04-01 RX ORDER — PANTOPRAZOLE SODIUM 40 MG/1
40 TABLET, DELAYED RELEASE ORAL
Status: DISCONTINUED | OUTPATIENT
Start: 2019-04-02 | End: 2019-04-02 | Stop reason: HOSPADM

## 2019-04-01 RX ORDER — DEXTROSE 50 % IN WATER (D50W) INTRAVENOUS SYRINGE
25-50 AS NEEDED
Status: DISCONTINUED | OUTPATIENT
Start: 2019-04-01 | End: 2019-04-02 | Stop reason: HOSPADM

## 2019-04-01 RX ORDER — EPHEDRINE SULFATE 50 MG/ML
INJECTION, SOLUTION INTRAVENOUS AS NEEDED
Status: DISCONTINUED | OUTPATIENT
Start: 2019-04-01 | End: 2019-04-01 | Stop reason: HOSPADM

## 2019-04-01 RX ORDER — ONDANSETRON 2 MG/ML
4 INJECTION INTRAMUSCULAR; INTRAVENOUS ONCE
Status: DISCONTINUED | OUTPATIENT
Start: 2019-04-01 | End: 2019-04-01 | Stop reason: HOSPADM

## 2019-04-01 RX ORDER — LABETALOL HYDROCHLORIDE 5 MG/ML
INJECTION, SOLUTION INTRAVENOUS AS NEEDED
Status: DISCONTINUED | OUTPATIENT
Start: 2019-04-01 | End: 2019-04-01 | Stop reason: HOSPADM

## 2019-04-01 RX ORDER — HYDROMORPHONE HYDROCHLORIDE 1 MG/ML
1 INJECTION, SOLUTION INTRAMUSCULAR; INTRAVENOUS; SUBCUTANEOUS
Status: DISCONTINUED | OUTPATIENT
Start: 2019-04-01 | End: 2019-04-02 | Stop reason: HOSPADM

## 2019-04-01 RX ORDER — SODIUM CHLORIDE, SODIUM LACTATE, POTASSIUM CHLORIDE, CALCIUM CHLORIDE 600; 310; 30; 20 MG/100ML; MG/100ML; MG/100ML; MG/100ML
75 INJECTION, SOLUTION INTRAVENOUS CONTINUOUS
Status: DISCONTINUED | OUTPATIENT
Start: 2019-04-01 | End: 2019-04-01 | Stop reason: HOSPADM

## 2019-04-01 RX ORDER — ACETAMINOPHEN 500 MG
1000 TABLET ORAL EVERY 6 HOURS
Status: DISCONTINUED | OUTPATIENT
Start: 2019-04-01 | End: 2019-04-02 | Stop reason: HOSPADM

## 2019-04-01 RX ORDER — DIAZEPAM 5 MG/1
5 TABLET ORAL
Status: DISCONTINUED | OUTPATIENT
Start: 2019-04-01 | End: 2019-04-02 | Stop reason: HOSPADM

## 2019-04-01 RX ORDER — HYDROMORPHONE HYDROCHLORIDE 2 MG/ML
0.5 INJECTION, SOLUTION INTRAMUSCULAR; INTRAVENOUS; SUBCUTANEOUS
Status: DISCONTINUED | OUTPATIENT
Start: 2019-04-01 | End: 2019-04-01 | Stop reason: HOSPADM

## 2019-04-01 RX ORDER — CEFAZOLIN SODIUM 2 G/50ML
2 SOLUTION INTRAVENOUS EVERY 8 HOURS
Status: DISCONTINUED | OUTPATIENT
Start: 2019-04-01 | End: 2019-04-02 | Stop reason: HOSPADM

## 2019-04-01 RX ORDER — PROPOFOL 10 MG/ML
INJECTION, EMULSION INTRAVENOUS AS NEEDED
Status: DISCONTINUED | OUTPATIENT
Start: 2019-04-01 | End: 2019-04-01 | Stop reason: HOSPADM

## 2019-04-01 RX ORDER — SODIUM CHLORIDE 0.9 % (FLUSH) 0.9 %
5-40 SYRINGE (ML) INJECTION AS NEEDED
Status: DISCONTINUED | OUTPATIENT
Start: 2019-04-01 | End: 2019-04-02 | Stop reason: HOSPADM

## 2019-04-01 RX ORDER — ONDANSETRON 2 MG/ML
INJECTION INTRAMUSCULAR; INTRAVENOUS AS NEEDED
Status: DISCONTINUED | OUTPATIENT
Start: 2019-04-01 | End: 2019-04-01 | Stop reason: HOSPADM

## 2019-04-01 RX ORDER — VANCOMYCIN HYDROCHLORIDE 1 G/20ML
INJECTION, POWDER, LYOPHILIZED, FOR SOLUTION INTRAVENOUS AS NEEDED
Status: DISCONTINUED | OUTPATIENT
Start: 2019-04-01 | End: 2019-04-01 | Stop reason: HOSPADM

## 2019-04-01 RX ORDER — LORAZEPAM 2 MG/ML
1 INJECTION INTRAMUSCULAR
Status: DISCONTINUED | OUTPATIENT
Start: 2019-04-01 | End: 2019-04-02 | Stop reason: HOSPADM

## 2019-04-01 RX ORDER — GLIPIZIDE 2.5 MG/1
2.5 TABLET, EXTENDED RELEASE ORAL 2 TIMES DAILY
Status: DISCONTINUED | OUTPATIENT
Start: 2019-04-01 | End: 2019-04-02 | Stop reason: HOSPADM

## 2019-04-01 RX ORDER — INSULIN LISPRO 100 [IU]/ML
INJECTION, SOLUTION INTRAVENOUS; SUBCUTANEOUS
Status: DISCONTINUED | OUTPATIENT
Start: 2019-04-01 | End: 2019-04-02 | Stop reason: HOSPADM

## 2019-04-01 RX ORDER — ROCURONIUM BROMIDE 10 MG/ML
INJECTION, SOLUTION INTRAVENOUS AS NEEDED
Status: DISCONTINUED | OUTPATIENT
Start: 2019-04-01 | End: 2019-04-01 | Stop reason: HOSPADM

## 2019-04-01 RX ORDER — MAGNESIUM SULFATE 100 %
4 CRYSTALS MISCELLANEOUS AS NEEDED
Status: DISCONTINUED | OUTPATIENT
Start: 2019-04-01 | End: 2019-04-02 | Stop reason: HOSPADM

## 2019-04-01 RX ORDER — DUTASTERIDE 0.5 MG/1
0.5 CAPSULE, LIQUID FILLED ORAL DAILY
Status: DISCONTINUED | OUTPATIENT
Start: 2019-04-02 | End: 2019-04-02 | Stop reason: HOSPADM

## 2019-04-01 RX ORDER — MAGNESIUM SULFATE 100 %
4 CRYSTALS MISCELLANEOUS AS NEEDED
Status: DISCONTINUED | OUTPATIENT
Start: 2019-04-01 | End: 2019-04-01 | Stop reason: HOSPADM

## 2019-04-01 RX ORDER — GABAPENTIN 100 MG/1
100 CAPSULE ORAL 3 TIMES DAILY
Status: DISCONTINUED | OUTPATIENT
Start: 2019-04-01 | End: 2019-04-02 | Stop reason: HOSPADM

## 2019-04-01 RX ORDER — CEFAZOLIN SODIUM 2 G/50ML
2 SOLUTION INTRAVENOUS ONCE
Status: COMPLETED | OUTPATIENT
Start: 2019-04-01 | End: 2019-04-01

## 2019-04-01 RX ORDER — METFORMIN HYDROCHLORIDE 500 MG/1
500 TABLET ORAL 2 TIMES DAILY WITH MEALS
Status: DISCONTINUED | OUTPATIENT
Start: 2019-04-01 | End: 2019-04-01

## 2019-04-01 RX ORDER — INSULIN LISPRO 100 [IU]/ML
INJECTION, SOLUTION INTRAVENOUS; SUBCUTANEOUS EVERY 6 HOURS
Status: DISCONTINUED | OUTPATIENT
Start: 2019-04-01 | End: 2019-04-01

## 2019-04-01 RX ORDER — SODIUM CHLORIDE 0.9 % (FLUSH) 0.9 %
5-40 SYRINGE (ML) INJECTION EVERY 8 HOURS
Status: DISCONTINUED | OUTPATIENT
Start: 2019-04-01 | End: 2019-04-02 | Stop reason: HOSPADM

## 2019-04-01 RX ORDER — LIDOCAINE HYDROCHLORIDE 20 MG/ML
INJECTION, SOLUTION EPIDURAL; INFILTRATION; INTRACAUDAL; PERINEURAL AS NEEDED
Status: DISCONTINUED | OUTPATIENT
Start: 2019-04-01 | End: 2019-04-01 | Stop reason: HOSPADM

## 2019-04-01 RX ORDER — NALOXONE HYDROCHLORIDE 0.4 MG/ML
0.4 INJECTION, SOLUTION INTRAMUSCULAR; INTRAVENOUS; SUBCUTANEOUS AS NEEDED
Status: DISCONTINUED | OUTPATIENT
Start: 2019-04-01 | End: 2019-04-02 | Stop reason: HOSPADM

## 2019-04-01 RX ADMIN — SUCCINYLCHOLINE CHLORIDE 120 MG: 20 INJECTION INTRAMUSCULAR; INTRAVENOUS at 12:45

## 2019-04-01 RX ADMIN — CEFAZOLIN SODIUM 2 G: 2 SOLUTION INTRAVENOUS at 12:47

## 2019-04-01 RX ADMIN — FENTANYL CITRATE 50 MCG: 50 INJECTION, SOLUTION INTRAMUSCULAR; INTRAVENOUS at 13:04

## 2019-04-01 RX ADMIN — GABAPENTIN 100 MG: 100 CAPSULE ORAL at 16:34

## 2019-04-01 RX ADMIN — FAMOTIDINE 20 MG: 10 INJECTION INTRAVENOUS at 11:27

## 2019-04-01 RX ADMIN — Medication 3 MG: at 13:51

## 2019-04-01 RX ADMIN — LABETALOL HYDROCHLORIDE 5 MG: 5 INJECTION, SOLUTION INTRAVENOUS at 14:09

## 2019-04-01 RX ADMIN — FENTANYL CITRATE 25 MCG: 50 INJECTION, SOLUTION INTRAMUSCULAR; INTRAVENOUS at 14:09

## 2019-04-01 RX ADMIN — ACETAMINOPHEN 1000 MG: 500 TABLET ORAL at 16:34

## 2019-04-01 RX ADMIN — ROCURONIUM BROMIDE 5 MG: 10 INJECTION, SOLUTION INTRAVENOUS at 12:44

## 2019-04-01 RX ADMIN — PROPOFOL 120 MG: 10 INJECTION, EMULSION INTRAVENOUS at 12:44

## 2019-04-01 RX ADMIN — Medication 10 ML: at 16:35

## 2019-04-01 RX ADMIN — CEFAZOLIN SODIUM 2 G: 2 SOLUTION INTRAVENOUS at 21:10

## 2019-04-01 RX ADMIN — ROCURONIUM BROMIDE 5 MG: 10 INJECTION, SOLUTION INTRAVENOUS at 13:42

## 2019-04-01 RX ADMIN — FENTANYL CITRATE 25 MCG: 50 INJECTION, SOLUTION INTRAMUSCULAR; INTRAVENOUS at 13:42

## 2019-04-01 RX ADMIN — EPHEDRINE SULFATE 10 MG: 50 INJECTION, SOLUTION INTRAVENOUS at 13:18

## 2019-04-01 RX ADMIN — GABAPENTIN 100 MG: 100 CAPSULE ORAL at 21:10

## 2019-04-01 RX ADMIN — OXYCODONE HYDROCHLORIDE 10 MG: 5 TABLET ORAL at 16:34

## 2019-04-01 RX ADMIN — GLIPIZIDE 2.5 MG: 2.5 TABLET, FILM COATED, EXTENDED RELEASE ORAL at 19:27

## 2019-04-01 RX ADMIN — OXYCODONE HYDROCHLORIDE 10 MG: 5 TABLET ORAL at 21:10

## 2019-04-01 RX ADMIN — ONDANSETRON 4 MG: 2 INJECTION INTRAMUSCULAR; INTRAVENOUS at 13:46

## 2019-04-01 RX ADMIN — SODIUM CHLORIDE, SODIUM LACTATE, POTASSIUM CHLORIDE, AND CALCIUM CHLORIDE: 600; 310; 30; 20 INJECTION, SOLUTION INTRAVENOUS at 13:55

## 2019-04-01 RX ADMIN — LIDOCAINE HYDROCHLORIDE 80 MG: 20 INJECTION, SOLUTION EPIDURAL; INFILTRATION; INTRACAUDAL; PERINEURAL at 12:44

## 2019-04-01 RX ADMIN — FENTANYL CITRATE 50 MCG: 50 INJECTION, SOLUTION INTRAMUSCULAR; INTRAVENOUS at 12:39

## 2019-04-01 RX ADMIN — SODIUM CHLORIDE, SODIUM LACTATE, POTASSIUM CHLORIDE, AND CALCIUM CHLORIDE 75 ML/HR: 600; 310; 30; 20 INJECTION, SOLUTION INTRAVENOUS at 11:27

## 2019-04-01 RX ADMIN — ROCURONIUM BROMIDE 25 MG: 10 INJECTION, SOLUTION INTRAVENOUS at 12:49

## 2019-04-01 RX ADMIN — EPHEDRINE SULFATE 5 MG: 50 INJECTION, SOLUTION INTRAVENOUS at 13:15

## 2019-04-01 RX ADMIN — GLYCOPYRROLATE 0.4 MG: 0.2 INJECTION INTRAMUSCULAR; INTRAVENOUS at 13:51

## 2019-04-01 NOTE — PERIOP NOTES
TRANSFER - OUT REPORT: 
 
Verbal report given to Sondra FRAUSTO(name) on Morris Perry  being transferred to [address] [place] [address](unit) for routine post - op Report consisted of patients Situation, Background, Assessment and  
Recommendations(SBAR). Information from the following report(s) SBAR, Kardex, OR Summary, Procedure Summary, Intake/Output, MAR, Recent Results and Med Rec Status was reviewed with the receiving nurse. Lines:  
Peripheral IV 04/01/19 Anterior;Right Forearm (Active) Site Assessment Clean, dry, & intact 4/1/2019  2:17 PM  
Phlebitis Assessment 0 4/1/2019  2:17 PM  
Infiltration Assessment 0 4/1/2019  2:17 PM  
Dressing Status Clean, dry, & intact 4/1/2019  2:17 PM  
Dressing Type Tape;Transparent 4/1/2019  2:17 PM  
Hub Color/Line Status Pink; Infusing 4/1/2019  2:17 PM  
   
Peripheral IV 04/01/19 Left;Posterior Hand (Active) Site Assessment Clean, dry, & intact 4/1/2019  2:17 PM  
Phlebitis Assessment 0 4/1/2019  2:17 PM  
Infiltration Assessment 0 4/1/2019  2:17 PM  
Dressing Status Clean, dry, & intact 4/1/2019  2:17 PM  
Dressing Type Tape;Transparent 4/1/2019  2:17 PM  
Hub Color/Line Status Pink;Capped 4/1/2019  2:17 PM  
  
 
Opportunity for questions and clarification was provided. Patient transported with: 
 [employer]

## 2019-04-01 NOTE — PROGRESS NOTES
OR today  Left 3/4, 4/5, 5/S1 laminectomy VSS Dressing dry AIDAN = 15 cc in bulb 4/5 BLE States left leg pain resolved PACs and PVCs noted intraoperatively EKG done in PACU -Sinus rhythm with occasional premature ventricular complexes, otherwise normal ECG Denies chest pain, dizziness or SOB Has not voided yet DM orders done Sruthi Roblero, NP

## 2019-04-01 NOTE — BRIEF OP NOTE
BRIEF OPERATIVE NOTE Date of Procedure: 4/1/2019 Preoperative Diagnosis: Spinal stenosis, lumbar region with neurogenic claudication [M48.062] Postoperative Diagnosis: Spinal stenosis, lumbar region with neurogenic claudication [M48.062] Procedure(s): LEFT L3/4, L4/5, L5/S1 LAMINECTOMY/C-ARM Surgeon(s) and Role: Dee Almaraz MD - Primary Surgical Assistant: 0 Surgical Staff: 
Circ-1: Tiara Suarez RN Radiology Technician: Dena Ward Scrub Tech-1: Uvaldo Arcos Surg Asst-1: Yolanda Jose Event Time In Time Out Incision Start 1304 Incision Close Anesthesia: General  
Estimated Blood Loss: 25 
Specimens: * No specimens in log * Findings: stenosis Complications: 0 Implants: * No implants in log *

## 2019-04-01 NOTE — ROUTINE PROCESS
Report given to Pancho Mays RN, including SBAR, MAR, and Kardex. Patient alert and oriented, vitals within normal limits, no apparent distress.

## 2019-04-01 NOTE — PERIOP NOTES
Dr. Delos Opitz given EKG to view. MD advised that patient did not need any intervention and may proceed to next phase of care.

## 2019-04-01 NOTE — INTERVAL H&P NOTE
H&P Update: 
Javier Art was seen and examined. History and physical has been reviewed. The patient has been examined.  There have been no significant clinical changes since the completion of the originally dated History and Physical. 
 
Signed By: Taylor Blackwell MD   
 April 1, 2019 11:46 AM

## 2019-04-01 NOTE — PROGRESS NOTES
conducted a pre-surgery visit with Anirudh Chapin, who is a 80 y.o.,male. The  provided the following Interventions: 
Initiated a relationship of care and support. Offered prayer and assurance of continued prayers on patient's behalf. Plan: 
Chaplains will continue to follow and will provide pastoral care on an as needed/requested basis.  recommends bedside caregivers page  on duty if patient shows signs of acute spiritual or emotional distress. Rita Samayoa Board Certified [employer] [facility]  
[phone]

## 2019-04-01 NOTE — PROGRESS NOTES
Problem: Mobility Impaired (Adult and Pediatric) Goal: *Acute Goals and Plan of Care (Insert Text) Description Physical Therapy Goals Initiated 4/1/2019 and to be accomplished within 7 day(s) 1. Patient will move from supine to sit and sit to supine  in bed with modified independence using proper log rolling technique. 2.  Patient will transfer from bed to chair and chair to bed with modified independence using the least restrictive device. 3.  Patient will perform sit to stand with modified independence. 4.  Patient will ambulate with modified independence for 200 feet with the least restrictive device. 5.  Patient will ascend/descend 3 stairs with bilateral handrail(s) with supervision/set-up. Outcome: Progressing Towards Goal 
PHYSICAL THERAPY EVALUATION Patient: Javier Art (84 y.o. male) Date: 4/1/2019 Primary Diagnosis: Spinal stenosis, lumbar region with neurogenic claudication [M48.062] Spinal stenosis [M48.00] Procedure(s) (LRB): LEFT L3/4, L4/5, L5/S1 LAMINECTOMY/C-ARM (Left) Day of Surgery Precautions:   Back, Spinal 
 
OBJECTIVE/ASSESSMENT : 
Based on the objective data described below, the patient presents with impaired functional mobility including bed mobility, transfers, ambulation, and general activity tolerance following admission for Left L3/4, L4/5, L5/S1 laminectomy. Patient presented today semi-reclined in bed, agreeable to work with PT and cleared by nursing to participate. Patient transferred supine-sit via log roll technique with min A. Pt displayed good sitting balance on EOB. Pt then transferred sit-stand with CGA and displayed good standing balance during preparation to ambulate. Pt ambulated with RW x 100 feet with SBA. Pt did not c/o pain or shortness of breath and had no losses of balance. Pt sat with CGA. Reviewed post-op spinal precautions with pt  and family.   At conclusion of session, patient left sitting up in recliner with call bell in reach, needs met, and nurse notified. Education:  
?         Bed mobility ? Transfers ? Ambulation ? Assistive device management ? Stairs ? Body mechanics ? Position change ? Activity pacing/energy conservation ? Other: Spinal precautions Patient will benefit from skilled intervention to address the above impairments. Patient?s rehabilitation potential is considered to be Good Factors which may influence rehabilitation potential include:  
? None noted ? Mental ability/status ? Medical condition ? Home/family situation and support systems ? Safety awareness 
? Pain tolerance/management 
? Other: PLAN : 
Recommendations and Planned Interventions: 
?           Bed Mobility Training             ? Neuromuscular Re-Education ? Transfer Training                   ? Orthotic/Prosthetic Training 
? Gait Training                          ? Modalities ? Therapeutic Exercises          ? Edema Management/Control ? Therapeutic Activities            ? Patient and Family Training/Education ? Other (comment): Frequency/Duration: Patient will be followed by physical therapy 1-2 times per day, 4-7 days per week to address goals. Discharge Recommendations: Home Health Further Equipment Recommendations for Discharge: N/A  
 
SUBJECTIVE:  
Patient stated ? I had the same surgery on the right side in October. ? OBJECTIVE DATA SUMMARY:  
 
Past Medical History:  
Diagnosis Date  
 BPH (benign prostatic hyperplasia) Diabetes [facility])   
 ED (erectile dysfunction) GERD (gastroesophageal reflux disease) Hypertension Nausea & vomiting Past Surgical History:  
Procedure Laterality Date HX BACK SURGERY  10/15/2018  HX CATARACT REMOVAL    
 HX ORTHOPAEDIC    
 back surgery HX SHOULDER REPLACEMENT Left 2016 HX TURP Barriers to Learning/Limitations: None Compensate with: N/A Prior Level of Function/Home Situation: Pt lives with wife in a 1 story home with 3 steps to enter, bilateral railings. Pt was ambulating independently in home and community with no AD and has a part time job with the Critical access hospital. Home Situation Home Environment: Private residence # Steps to Enter: 3 Rails to Enter: Yes Hand Rails : Bilateral 
One/Two Story Residence: One story Living Alone: No 
Support Systems: Family member(s) Patient Expects to be Discharged to[de-identified] Private residence Current DME Used/Available at Home: Walker, rolling Critical Behavior: 
Neurologic State: Alert Strength:   
Strength: Within functional limits(B LEs) Tone & Sensation:  
Tone: Normal 
Sensation: Intact Range Of Motion: 
AROM: Within functional limits(B LEs) Functional Mobility: 
Bed Mobility: 
Rolling: Supervision Supine to Sit: Minimum assistance Transfers: 
Sit to Stand: Stand-by assistance Stand to Sit: Stand-by assistance Balance:  
Sitting: Intact Standing: Intact; With support Ambulation/Gait Training: 
Distance (ft): 100 Feet (ft) Ambulation - Level of Assistance: Supervision Pain: 
Pre session: mild (left side of low back) Post session: mild (left side of low back) Activity Tolerance:  
good Please refer to the flowsheet for vital signs taken during this treatment. After treatment:  
? Patient left in no apparent distress sitting up in chair ? Patient left sitting on EOB ? Patient left in no apparent distress in bed ? Patient declined to be OOB at this time due to  
? Call bell left within reach ? Nursing notified(Vidal Amaro) ? Caregiver present ? Bed alarm activated ? SCDs in place COMMUNICATION/EDUCATION:  
?         Fall prevention education was provided and the patient/caregiver indicated understanding. ?         Patient/family have participated as able in goal setting and plan of care. ?         Patient/family agree to work toward stated goals and plan of care. ?         Patient understands intent and goals of therapy, but is neutral about his/her participation. ? Patient is unable to participate in goal setting and plan of care. Thank you for this referral. 
Ronda Kelley, PT Time Calculation: 24 mins Eval Complexity: History: MEDIUM  Complexity : 1-2 comorbidities / personal factors will impact the outcome/ POC Exam:LOW Complexity : 1-2 Standardized tests and measures addressing body structure, function, activity limitation and / or participation in recreation  Presentation: LOW Complexity : Stable, uncomplicated  Clinical Decision Making:Low Complexity    Overall Complexity:LOW

## 2019-04-01 NOTE — ANESTHESIA POSTPROCEDURE EVALUATION
Procedure(s): LEFT L3/4, L4/5, L5/S1 LAMINECTOMY/C-ARM. general 
 
Anesthesia Post Evaluation Multimodal analgesia: multimodal analgesia used between 6 hours prior to anesthesia start to PACU discharge Patient location during evaluation: bedside Patient participation: complete - patient participated Level of consciousness: awake Pain management: adequate Airway patency: patent Anesthetic complications: no 
Cardiovascular status: stable Respiratory status: acceptable Hydration status: acceptable Post anesthesia nausea and vomiting:  controlled Vitals Value Taken Time /51 4/1/2019  3:05 PM  
Temp 36.6 °C (97.9 °F) 4/1/2019  3:03 PM  
Pulse 64 4/1/2019  3:10 PM  
Resp 18 4/1/2019  3:10 PM  
SpO2 99 % 4/1/2019  3:10 PM  
Vitals shown include unvalidated device data.

## 2019-04-01 NOTE — ROUTINE PROCESS
TRANSFER - IN REPORT: 
 
Verbal report received from Chilo Rusun Avila (name) on Quincy Rollins  being received from PACU (unit) for routine post - op Report consisted of patients Situation, Background, Assessment and  
Recommendations(SBAR). Information from the following report(s) SBAR, Kardex, OR Summary and MAR was reviewed with the receiving nurse. Opportunity for questions and clarification was provided. Assessment completed upon patients arrival to unit and care assumed.

## 2019-04-01 NOTE — ANESTHESIA PREPROCEDURE EVALUATION
Relevant Problems No relevant active problems Anesthetic History PONV Review of Systems / Medical History Patient summary reviewed and pertinent labs reviewed Pulmonary Within defined limits Neuro/Psych Within defined limits Cardiovascular Hypertension: well controlled Exercise tolerance: >4 METS 
  
GI/Hepatic/Renal 
  
GERD: well controlled Endo/Other Diabetes: type 2 Other Findings Comments:  
 
 
  
 
 
 
 
Physical Exam 
 
Airway Mallampati: II 
TM Distance: 4 - 6 cm Neck ROM: normal range of motion Mouth opening: Normal 
 
 Cardiovascular Regular rate and rhythm,  S1 and S2 normal,  no murmur, click, rub, or gallop Rhythm: regular Rate: normal 
 
 
 
 Dental 
 
Dentition: Edentulous Pulmonary Breath sounds clear to auscultation Abdominal 
GI exam deferred Other Findings Anesthetic Plan ASA: 2 Anesthesia type: general 
 
 
 
 
Induction: Intravenous Anesthetic plan and risks discussed with: Patient

## 2019-04-02 VITALS
HEART RATE: 73 BPM | OXYGEN SATURATION: 96 % | WEIGHT: 179 LBS | HEIGHT: 67 IN | DIASTOLIC BLOOD PRESSURE: 73 MMHG | BODY MASS INDEX: 28.09 KG/M2 | TEMPERATURE: 98.1 F | SYSTOLIC BLOOD PRESSURE: 139 MMHG | RESPIRATION RATE: 19 BRPM

## 2019-04-02 LAB
ATRIAL RATE: 70 BPM
CALCULATED P AXIS, ECG09: 39 DEGREES
CALCULATED R AXIS, ECG10: -30 DEGREES
CALCULATED T AXIS, ECG11: 27 DEGREES
DIAGNOSIS, 93000: NORMAL
GLUCOSE BLD STRIP.AUTO-MCNC: 145 MG/DL (ref 70–110)
GLUCOSE BLD STRIP.AUTO-MCNC: 159 MG/DL (ref 70–110)
P-R INTERVAL, ECG05: 154 MS
Q-T INTERVAL, ECG07: 404 MS
QRS DURATION, ECG06: 92 MS
QTC CALCULATION (BEZET), ECG08: 436 MS
VENTRICULAR RATE, ECG03: 70 BPM

## 2019-04-02 PROCEDURE — 97165 OT EVAL LOW COMPLEX 30 MIN: CPT

## 2019-04-02 PROCEDURE — 97530 THERAPEUTIC ACTIVITIES: CPT

## 2019-04-02 PROCEDURE — 74011250637 HC RX REV CODE- 250/637: Performed by: ORTHOPAEDIC SURGERY

## 2019-04-02 PROCEDURE — 82962 GLUCOSE BLOOD TEST: CPT

## 2019-04-02 PROCEDURE — 97116 GAIT TRAINING THERAPY: CPT

## 2019-04-02 PROCEDURE — 99218 HC RM OBSERVATION: CPT

## 2019-04-02 PROCEDURE — 74011250636 HC RX REV CODE- 250/636: Performed by: ORTHOPAEDIC SURGERY

## 2019-04-02 PROCEDURE — 97535 SELF CARE MNGMENT TRAINING: CPT

## 2019-04-02 PROCEDURE — 93005 ELECTROCARDIOGRAM TRACING: CPT

## 2019-04-02 RX ORDER — OXYCODONE AND ACETAMINOPHEN 5; 325 MG/1; MG/1
1 TABLET ORAL
Qty: 20 TAB | Refills: 0 | Status: SHIPPED | OUTPATIENT
Start: 2019-04-02 | End: 2019-04-05

## 2019-04-02 RX ADMIN — AMLODIPINE BESYLATE 5 MG: 5 TABLET ORAL at 09:37

## 2019-04-02 RX ADMIN — LISINOPRIL 40 MG: 40 TABLET ORAL at 09:38

## 2019-04-02 RX ADMIN — CEFAZOLIN SODIUM 2 G: 2 SOLUTION INTRAVENOUS at 04:20

## 2019-04-02 RX ADMIN — OXYCODONE HYDROCHLORIDE 10 MG: 5 TABLET ORAL at 05:53

## 2019-04-02 RX ADMIN — OXYCODONE HYDROCHLORIDE 10 MG: 5 TABLET ORAL at 01:33

## 2019-04-02 RX ADMIN — DUTASTERIDE 0.5 MG: 0.5 CAPSULE, LIQUID FILLED ORAL at 09:38

## 2019-04-02 RX ADMIN — ACETAMINOPHEN 1000 MG: 500 TABLET ORAL at 12:20

## 2019-04-02 RX ADMIN — Medication 10 ML: at 05:54

## 2019-04-02 RX ADMIN — PANTOPRAZOLE SODIUM 40 MG: 40 TABLET, DELAYED RELEASE ORAL at 09:38

## 2019-04-02 RX ADMIN — Medication 10 ML: at 00:12

## 2019-04-02 RX ADMIN — GABAPENTIN 100 MG: 100 CAPSULE ORAL at 09:38

## 2019-04-02 RX ADMIN — GLIPIZIDE 2.5 MG: 2.5 TABLET, FILM COATED, EXTENDED RELEASE ORAL at 09:38

## 2019-04-02 RX ADMIN — OXYCODONE HYDROCHLORIDE 10 MG: 5 TABLET ORAL at 11:06

## 2019-04-02 RX ADMIN — CEFAZOLIN SODIUM 2 G: 2 SOLUTION INTRAVENOUS at 12:20

## 2019-04-02 RX ADMIN — ACETAMINOPHEN 1000 MG: 500 TABLET ORAL at 05:53

## 2019-04-02 NOTE — OP NOTES
[facility]  OPERATIVE REPORT    Name:  Lc Blanco  MR#:   [id number]  :  1932  ACCOUNT #:  [de-identified]  DATE OF SERVICE:  2019    PREOPERATIVE DIAGNOSIS:  Spinal stenosis. POSTOPERATIVE DIAGNOSIS:  Spinal stenosis. PROCEDURE PERFORMED:  Left L3-L4, L4-L5, and L5-S1 hemilaminotomy and medial facetectomy. SURGEON:  Sundeep Reyna MD    ASSISTANT:  0.    ANESTHESIA:  Endotracheal.    COMPLICATIONS:  None. SPECIMENS REMOVED:  None. IMPLANTS:  None. ESTIMATED BLOOD LOSS:  10-25 mL. FINDINGS:  The patient had poor bone and lateral recess stenosis at L3-L4 and L4-L5. Dense scar at L5-S1, difficult to decompress safely. DESCRIPTION OF PROCEDURE:  Following induction of endotracheal anesthesia, the patient was turned to prone position on a spinal frame. The patient was prepped and draped in the usual fashion. Midline incision was made along the patient's previous incisions. Paramedian incisions were made in the lumbodorsal fascia and subperiosteal dissection done at L3-L4, L4-L5, and L5-S1. C-arm image verified the surgical level. Beginning at L4-L5, a hemilaminotomy was done. Medial facetectomy and foraminotomy with resection of the anterior ligamentum flavum until I had clearly been able to palpate out the L4-L5 foramina and palpate the L5 pedicle. I extended proximally until I did a laminotomy and medial facetectomy with foraminotomy resolving the stenosis at L3-L4. At L5-S1, there was dense scar apparent. I did begin hemilaminotomy. It was difficult to decompress at the medial facet. Everything was densely scarred together, I was concerned about incurring a dural injury. Decompression was accomplished. Segments appeared to be stable. Generalized oozing from bony surfaces were encountered and controlled with Gelfoam and thrombin. No CSF leak was encountered. Hemostasis was maintained with electrocautery and Gelfoam and thrombin.   Vancomycin powder was instilled for infection prophylaxis. A deep drain was utilized. The fascia and scarified tissues consistent with fascia were closed with number 1 Vicryl. Subcutaneous tissues were closed with 2-0 Vicryl. Skin was closed with 4-0 Monocryl subcuticular suture and Dermabond. A sterile occlusive dressing was placed upon the wound. All counts were correct. Intraoperatively, Anesthesia noted possible ectopy on EKG, though he maintained hemodynamic instability and we will check him in Recovery with an EKG.       MD JOSE MARTIN Arreguin/[email or web address]  D:  04/01/2019 14:08  T:  04/01/2019 22:09  JOB #:  [id number]

## 2019-04-02 NOTE — ROUTINE PROCESS
Bedside and Verbal shift change report given to Brodie Weiss (oncoming nurse) by Wilver Hill (offgoing nurse). Report included the following information SBAR, Kardex, Intake/Output, MAR and Recent Results.

## 2019-04-02 NOTE — PROGRESS NOTES
Problem: Falls - Risk of 
Goal: *Absence of Falls Description Document Judah President Fall Risk and appropriate interventions in the flowsheet. Outcome: Progressing Towards Goal 
  
Problem: Patient Education: Go to Patient Education Activity Goal: Patient/Family Education Outcome: Progressing Towards Goal 
  
Problem: Patient Education: Go to Patient Education Activity Goal: Patient/Family Education Outcome: Progressing Towards Goal 
  
Problem: Infection - Risk of, Surgical Site Infection Goal: *Absence of surgical site infection signs and symptoms Outcome: Progressing Towards Goal 
  
Problem: Infection - Risk of, Surgical Site Infection Goal: *Absence of surgical site infection signs and symptoms Outcome: Progressing Towards Goal 
  
Problem: Simple Spine Procedure:  Day of Surgery Goal: Off Pathway (Use only if patient is Off Pathway) Outcome: Progressing Towards Goal 
Goal: Activity/Safety Outcome: Progressing Towards Goal 
Goal: Consults, if ordered Outcome: Progressing Towards Goal 
Goal: Nutrition/Diet Outcome: Progressing Towards Goal 
Goal: Discharge Planning Outcome: Progressing Towards Goal 
Goal: Medications Outcome: Progressing Towards Goal 
Goal: Respiratory Outcome: Progressing Towards Goal 
Goal: Treatments/Interventions/Procedures Outcome: Progressing Towards Goal 
Goal: Psychosocial 
Outcome: Progressing Towards Goal 
Goal: *Verbalizes understanding of type and use of pain medication Outcome: Progressing Towards Goal 
Goal: *Optimal pain control at patient's stated goal 
Outcome: Progressing Towards Goal 
Goal: *Verbalizes/demonstrates understanding of proper body mechanics and use of stabilization device if ordered Outcome: Progressing Towards Goal 
Goal: *Activity level attained as ordered Outcome: Progressing Towards Goal 
Goal: *Active bowel sounds Outcome: Progressing Towards Goal 
Goal: *Respiratory status stable Outcome: Progressing Towards Goal 
 Goal: *Adequate urinary output Outcome: Progressing Towards Goal 
Goal: *Hemodynamically stable Outcome: Progressing Towards Goal

## 2019-04-02 NOTE — DISCHARGE INSTRUCTIONS
PATIENT DISCHARGE INSTRUCTIONS      PATIENT DISCHARGE INSTRUCTIONS    Gertrudis Urias / [id number] : 1932    Admitted 2019 Discharged: 2019       · It is important that you take the medication exactly as they are prescribed. · Keep your medication in the bottles provided by the pharmacist and keep a list of the medication names, dosages, and times to be taken in your wallet. · Do not take other medications without consulting your doctor. What to do at Home    Recommended Diet: Regular Diet    Recommended Activity: No lifting, Driving, or Strenuous exercise for 2 weeks    If you experience any of the following symptoms  fever greater than 101.5, increased pain or wound drainage,, please follow up with [facility] Provider.   .        Signed By: Verna Baker NP     2019

## 2019-04-02 NOTE — DISCHARGE SUMMARY
Discharge  Summary     Patient: Morris Perry MRN: [id number]  SSN: [id number]    YOB: 1932  Age: 80 y.o. Sex: male       Admit Date: 4/1/2019    Discharge Date: 4/2/2019      Admission Diagnoses: Spinal stenosis, lumbar region with neurogenic claudication [M48.062]  Spinal stenosis [M48.00]    Discharge Diagnoses:   Problem List as of 4/2/2019 Date Reviewed: 4/1/2019          Codes Class Noted - Resolved    Spinal stenosis ICD-10-CM: M48.00  ICD-9-CM: 724.00  4/1/2019 - Present        Lumbar disc herniation ICD-10-CM: M51.26  ICD-9-CM: 722.10  10/15/2018 - Present        DDD (degenerative disc disease), lumbar ICD-10-CM: M51.36  ICD-9-CM: 722.52  9/12/2018 - Present        Lumbar post-laminectomy syndrome ICD-10-CM: M96.1  ICD-9-CM: 722.83  9/12/2018 - Present        Lumbar neuritis ICD-10-CM: M54.16  ICD-9-CM: 724.4  9/12/2018 - Present        Lumbar stenosis without neurogenic claudication ICD-10-CM: M48.061  ICD-9-CM: 724.02  9/12/2018 - Present        Lumbosacral spondylosis without myelopathy ICD-10-CM: M47.817  ICD-9-CM: 721.3  9/12/2018 - Present        Therapeutic drug monitoring ICD-10-CM: Z51.81  ICD-9-CM: V58.83  9/6/2018 - Present        Long term current use of opiate analgesic ICD-10-CM: Z79.891  ICD-9-CM: V58.69  9/6/2018 - Present        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       Hospital Course: Normal hospital course for this procedure. Tolerated surgical intervention well. Incision dry and intact. Ambulatory. Disposition: home    Discharge Medications:   Current Discharge Medication List      START taking these medications    Details   oxyCODONE-acetaminophen (PERCOCET) 5-325 mg per tablet Take 1 Tab by mouth every six (6) hours as needed for Pain for up to 3 days. Max Daily Amount: 4 Tabs. Qty: 20 Tab, Refills: 0    Associated Diagnoses: S/P lumbar laminectomy         CONTINUE these medications which have NOT CHANGED    Details   dutasteride (AVODART) 0.5 mg capsule Take 1 Cap by mouth daily. Qty: 90 Cap, Refills: 3      gabapentin (NEURONTIN) 100 mg capsule Take 1 Cap by mouth three (3) times daily. Qty: 90 Cap, Refills: 3    Associated Diagnoses: S/P lumbar laminectomy; HNP (herniated nucleus pulposus), lumbar      saw palmetto 500 mg cap Take 450 mg by mouth. Indications: 2 at noon and 2 at dinner      lisinopril (PRINIVIL, ZESTRIL) 40 mg tablet Take 40 mg by mouth daily. amLODIPine (NORVASC) 5 mg tablet Take 5 mg by mouth daily. glipiZIDE SR (GLUCOTROL) 2.5 mg CR tablet Take  by mouth two (2) times a day. metFORMIN (GLUCOPHAGE) 500 mg tablet Take  by mouth two (2) times daily (with meals). esomeprazole (NEXIUM) 20 mg capsule Take  by mouth daily. multivitamin with iron (DAILY MULTI-VITAMINS/IRON) tablet Take 1 tablet by mouth daily. VITAMIN E, DL,TOCOPHERYL ACET, (VITAMIN E ACETATE) 400 unit cap capsule Take  by mouth daily. Takes at noon      cinnamon bark (CINNAMON) 500 mg cap Take  by mouth two (2) times a day. Takes one tablet at noon and one tablet at dinner      aspirin delayed-release 81 mg tablet Take  by mouth nightly. Follow-up Appointments   Procedures    FOLLOW UP VISIT Appointment in: Two Weeks     Standing Status:   Standing     Number of Occurrences:   1     Order Specific Question:   Appointment in     Answer:    Two Weeks       Signed By: Laura Hampton NP     April 2, 2019

## 2019-04-02 NOTE — PROGRESS NOTES
Problem: Mobility Impaired (Adult and Pediatric) Goal: *Acute Goals and Plan of Care (Insert Text) Description Physical Therapy Goals Initiated 4/1/2019 and to be accomplished within 7 day(s) 1. Patient will move from supine to sit and sit to supine  in bed with modified independence using proper log rolling technique. 2.  Patient will transfer from bed to chair and chair to bed with modified independence using the least restrictive device. 3.  Patient will perform sit to stand with modified independence. 4.  Patient will ambulate with modified independence for 200 feet with the least restrictive device. 5.  Patient will ascend/descend 3 stairs with bilateral handrail(s) with supervision/set-up. Outcome: Progressing Towards Goal 
 PHYSICAL THERAPY TREATMENT Patient: Kevin Kong (89 y.o. male) Date: 4/2/2019 Diagnosis: Spinal stenosis, lumbar region with neurogenic claudication [M48.062] Spinal stenosis [M48.00] <principal problem not specified> Procedure(s) (LRB): LEFT L3/4, L4/5, L5/S1 LAMINECTOMY/C-ARM (Left) 1 Day Post-Op Precautions: Back, Spinal  
Chart, physical therapy assessment, plan of care and goals were reviewed. OBJECTIVE/ ASSESSMENT: 
Patient found seated in recliner willing to work with PT. Pt voiced good recall of cervical spinal precautions prior to mobility, and able to maintain t/o tx this visit. Pt able to perform all mobility tasks this visit at a CGA to supervision level this visit req most assistance for amb w/o AD for safety. Pt able to display fair standing balance at first unsupported when donning robe req support from chair for stability. Pt amb w/ RW from room to vides req cueing for RW negotiation and compliance. Pt trialed amb w/o use of AD and displayed good stability dynamically. Pt safely performed 3 steps this visit using both hand rails req cueing for step to pattern descending for safety.  Pt amb for a total of 300' this visit and returned to room to recliner, provided further education, and left in room w/ all needs in reach. From a PT standpoint, pt is safe to return home w/ assistance. Pt and family adamantly deferred further therapy at a home level. Pt provided education on benefits of home therapy to assist in home safety and mobility all to maintain spinal precautions. Pt stated \"If you really think I need it I'll do it, but if not I don't want to waste my time and money. \" From a physical therapy standpoint, pt would be safe to return home w/o continued home therapy as pt displayed safe practices w/ mobility all while maintaining spinal precaution adherence. Therefore, home health is not recommended at this time. Pt instructed to speak w/ MD/staff concerning further therapy if further have further concerns. Education: 
?         Bed mobility ? Transfers ? Ambulation / gait ? Assistive device management ? Stairs ? Body mechanics ? Position change ? Therapeutic exercise ? Activity pacing / energy conservation ? Other: 
 
Progression toward goals: 
?      Improving appropriately and progressing toward goals ? Improving slowly and progressing toward goals ? Not making progress toward goals and plan of care will be adjusted PLAN: 
Patient continues to benefit from skilled intervention to address the above impairments. Continue treatment per established plan of care. Discharge Recommendations:  None Further Equipment Recommendations for Discharge:  rolling walker SUBJECTIVE:  
Patient stated ? The last time therapy came to the house for my back, they walked w/ me and told me how to my pants on for a week and never came back. That's a waste of my money and time that could be spent on someone else who needs it.? OBJECTIVE DATA SUMMARY:  
Critical Behavior: 
Neurologic State: Alert Orientation Level: Oriented X4 
 Cognition: Appropriate decision making Functional Mobility Training: 
Transfers: 
Sit to Stand: Supervision Stand to Sit: Supervision Balance: 
Sitting: Intact Standing: Intact; With support; Without support Ambulation/Gait Training: 
Distance (ft): 300 Feet (ft) Assistive Device: Walker, rolling(none) Ambulation - Level of Assistance: Supervision;Contact guard assistance(CGA for no AD) Gait Abnormalities: Decreased step clearance Base of Support: Center of gravity altered Speed/Kera: Pace decreased (<100 feet/min) Stairs: 
Number of Stairs Trained: 3 Stairs - Level of Assistance: Stand-by assistance Rail Use: Both 
 
Pain:0/10 Activity Tolerance:  
Good Please refer to the flowsheet for vital signs taken during this treatment. After treatment:  
? Patient left in no apparent distress sitting up in chair ? Patient left in no apparent distress in bed 
? Call bell left within reach ? Nursing notified ? Caregiver present ? Bed alarm activated ? SCDs applied ? Ice applied Shawnee Galicia PTA Time Calculation: 25 mins

## 2019-04-02 NOTE — PROGRESS NOTES
Patients is lying in bed alert awake and oriented family members at bed side, dressings to back dry and intact at this time AIDAN drain discontinue per ordered. No s/s of distress or sob.

## 2019-04-02 NOTE — PROGRESS NOTES
Discharge planning Discharge order noted for today. Pt and family refused home health. Yovany Askew was notified by Bruna Rivera and family. Camille with [phone] notified. Met with patient and family and are agreeable to the transition plan today. Transport has been arranged with wife and son. Updated bedside RN, Ezra Flores,  to the transition plan. Discharge information has been documented on the AVS. ANGELO Gracia, RN Pager # [phone] Care Manager

## 2019-04-02 NOTE — ROUTINE PROCESS
Pt given discharge instructions. Pt verbalized understanding.  IV d/cd no signs of infection noted. Each page verified that it matched pts name. Drain d/cd by Jemima soliman. Pt stable.

## 2019-04-02 NOTE — PROGRESS NOTES
OT order received and chart reviewed. Patient seen for skilled OT evaluation and is safe for discharge home when medically stable. Full note to follow.    
 
Thank you for the referral.   
Tuyet Clark MS, OTR/L

## 2019-04-02 NOTE — PROGRESS NOTES
Reason for Admission:  Spinal stenosis, lumbar region with neurogenic claudication [M48.062] Spinal stenosis [M48.00] RRAT Score:    12 Plan for utilizing home health:    Yes, FOC obtained for [facility] Likelihood of Readmission:   LOW Transition of Care Plan:         
 
 
Initial assessment completed with patient. Cognitive status of patient: oriented to time, place, person and situation. Face sheet information confirmed:  yes. The patient designates spouse, Best Decker to participate in his discharge plan and to receive any needed information. This patient lives in a single family home with patient and wife. Patient is able to navigate steps as needed. Prior to hospitalization, patient was considered to be independent with ADLs/IADLS : yes . Patient has a current ACP document on file: no The patient and spouse will be available to transport patient home upon discharge. The patient already has NICOLE Vasquez, [facility], and  medical equipment available in the home. Patient is not currently active with home health. Patient has not stayed in a skilled nursing facility or rehab. This patient is on dialysis :no 
 
 
List of available Home Health agencies were provided and reviewed with the patient prior to discharge. Freedom of choice signed: yes, for [facility]. Currently, the discharge plan is Home with [address] [place] [address] [place]. Referal called to [facility] and placed in que The patient states that he can obtain his medications from the pharmacy, and take his medications as directed. Patient's current insurance is Medicare/[employer] Care Management Interventions PCP Verified by CM: Yes 
Palliative Care Criteria Met (RRAT>21 & CHF Dx)?: No 
Mode of Transport at Discharge: Self Transition of Care Consult (CM Consult): Home Health [address]: Yes Discharge Durable Medical Equipment: No 
Physical Therapy Consult:  Yes 
 Current Support Network: Lives with Spouse Confirm Follow Up Transport: Family Plan discussed with Pt/Family/Caregiver: Yes Freedom of Choice Offered: Yes Discharge Location Discharge Placement: Home with home health [facility]ONJA Kevin, [address] [facility]

## 2019-04-02 NOTE — HOME CARE
Received HH referral, upon talking with patient's son Zulma Ruff), he states that his father does not want any HH care,states \" he's had it before and knows what to do and does not want [place] again\" , notified case mananger Junior Delgado) that pt is refusing HH,she states that she has notified Carline Fritz NP that pt does not want any HH. MONSERRAT HUFFMAN.

## 2019-04-02 NOTE — PROGRESS NOTES
PO day #1 VSS Ambulated 300 feet with PT 
AIDAN = 30 cc, will d/c AIDAN Dressing dry Pt denies pain Will d/c to home F/u in office in 2 weeks Rhoda Andino, NP

## 2019-04-02 NOTE — PROGRESS NOTES
vss afeb Neuro intact Drain wet- though appears to have stopped Leg pain goneno complaints Plan Observe Pt Dc in pm if well

## 2019-04-02 NOTE — PROGRESS NOTES
Problem: Self Care Deficits Care Plan (Adult) Goal: *Acute Goals and Plan of Care (Insert Text) Outcome: Resolved/Met OCCUPATIONAL THERAPY EVALUATION/DISCHARGE Patient: Remington Morris (23 y.o. male) Date: 4/2/2019 Primary Diagnosis: Spinal stenosis, lumbar region with neurogenic claudication [M48.062] Spinal stenosis [M48.00] Procedure(s) (LRB): LEFT L3/4, L4/5, L5/S1 LAMINECTOMY/C-ARM (Left) 1 Day Post-Op Precautions:  Back, Spinal 
PLOF: Pt reports he was (I) with basic-self care/ADLs and IADLs, including driving and working for the [employer]. ASSESSMENT AND RECOMMENDATIONS: 
Pt cleared to participate in OT evaluation by RN. Upon entering room, pt seated in chair, alert, and agreeable to therapy session with son and wife present. Based on the objective data described below, the patient presents he is motivated to return home as he is (I) in basic self-care tasks, requiring supervision for toilet transfers, and SBA for LB dressing. As the pt currently has back/spinal precautions, issued pt a reacher to doff socks, sock aid to don socks, long handled sponge for LB bathing, and long handled shoe horn to don shoes with back. Pt is able to perform LB dressing while sitting in chair with SBA. Pt requires Supervision during ambulation to the bathroom for toileting/toilet transfers, demonstrating Fair dynamic standing balance. However, will defer to PT for functional balance and functional mobility tasks. Educated pt on the role of OT, adaptive equipment, clothing management, and back/spinal precautions with the pt demonstrating good understanding. The pt has a supportive wife at home to assist him PRN. Skilled occupational therapy is not indicated at this time. Discharge Recommendations: None Further Equipment Recommendations for Discharge: N/A; Pt has all the recommended equipment to safely return home. SUBJECTIVE:  
Patient stated ?my wife will help me?  
 
OBJECTIVE DATA SUMMARY:  
 
 Past Medical History:  
Diagnosis Date  
 BPH (benign prostatic hyperplasia) Diabetes [facility])   
 ED (erectile dysfunction) GERD (gastroesophageal reflux disease) Hypertension Nausea & vomiting Past Surgical History:  
Procedure Laterality Date HX BACK SURGERY  10/15/2018 HX CATARACT REMOVAL    
 HX ORTHOPAEDIC    
 back surgery HX SHOULDER REPLACEMENT Left 2016 HX TURP Barriers to Learning/Limitations: None Compensate with: visual, verbal, tactile, kinesthetic cues/model Home Situation: Pt reports he lives with his wife and son in a [facility] with 3STE. Home Situation Home Environment: Private residence # Steps to Enter: 3 Rails to Enter: Yes Hand Rails : Bilateral 
One/Two Story Residence: One story Living Alone: No 
Support Systems: Family member(s) Patient Expects to be Discharged to[de-identified] Private residence Current DME Used/Available at Home: Walker, rolling; Rw; Came; Wheelchair; Shower chair; Grab bars; Raised toilet seat; Bedside commode Tub or Shower Type: Tub/Shower combination ? Right hand dominant   ? Left hand dominant Cognitive/Behavioral Status: 
Neurologic State: Alert Orientation Level: Oriented X4 Cognition: Follows commands Safety/Judgement: Awareness of environment; Fall prevention Skin: Visible skin appeared intact Edema: None noted Vision/Perceptual:   
Acuity: Within Defined Limits(without glasses) Coordination: BUE Coordination: Within functional limits Fine Motor Skills-Upper: Left Intact; Right Intact Gross Motor Skills-Upper: Left Intact; Right Intact Balance: 
Sitting: Intact Standing: Intact Strength: BUE Strength: Within functional limits Tone & Sensation: BUE Tone: Normal 
Sensation: Intact Range of Motion: BUE 
AROM: Within functional limits Functional Mobility and Transfers for ADLs: 
Bed Mobility: 
Pt seated in recliner upon arrival 
Transfers: 
Sit to Stand: Supervision ADL Assessment: Feeding: Independent Oral Facial Hygiene/Grooming: Independent Bathing: Supervision Upper Body Dressing: Supervision Lower Body Dressing: Standby assistance (Issued reacher, sock aid, and long handled shoe horn) Toileting: Supervision ADL Intervention: Lower Body Dressing Assistance Dressing Assistance: Standby assistance Position Performed: Seated in chair Adaptive Equipment Used: Reacher;Sock aid; Long handled shoe horn Cognitive Retraining Safety/Judgement: Awareness of environment; Fall prevention Pain: 
Pain level pre-treatment: 0/10 Pain level post-treatment: 0/10 Activity Tolerance:  
Good Please refer to the flowsheet for vital signs taken during this treatment. After treatment:  
?  Patient left in no apparent distress sitting up in chair ? Patient left in no apparent distress in bed 
? Call bell left within reach ? Nursing notified ? Family members present ? Bed alarm activated COMMUNICATION/EDUCATION:  
?      Role of Occupational Therapy in the acute care setting 
? Home safety education was provided and the patient/caregiver indicated understanding. ? Patient/family have participated as able and agree with findings and recommendations. ?      Patient is unable to participate in plan of care at this time. Thank you for this referral. 
Maribel Robertson, OT Time Calculation: 23 mins Eval Complexity: History: MEDIUM Complexity : Expanded review of history including physical, cognitive and psychosocial  history ; Examination: LOW Complexity : 1-3 performance deficits relating to physical, cognitive , or psychosocial skils that result in activity limitations and / or participation restrictions ; Decision Making:LOW Complexity : No comorbidities that affect functional and no verbal or physical assistance needed to complete eval tasks

## 2019-04-02 NOTE — PROGRESS NOTES
Patient educated: Activity: OOB for all meals, walk every hour to prevent blood clots Follow back precautions (no bending, twisting, lifting &  Log roll in/out of bed). VTE prophylaxis:  
Use SCD pumps except when walking. Ankle pumps 10 times an hour at hospital & home. Take blood thinner medication as ordered by surgeon. Do not skip a dose. Pain Control: 
Pain medications side effects discussed. Wean off narcotics ASAP. Use Tylenol ( 3000 mg/24 hours) , ice, distraction, moving, & change position to help with pain. Don't get nauseated. Eat a snack before taking pain medication Do not get constipated: take stool softener/mild laxative daily while on narcotics. Incentive Spirometry:   
Use of incentive spirometer 10 x/hr. Demonstration  2500 ml x 3 Wound Care: Dressingto back dry and intact. Do not to take dressing off at home. Bathe daily with dial soap & wear clean clothes/clean towel. No lotions, powders, creams to surgical leg. Ethelene Gauss Diet:  
Eat for healing. Protein heals bone/muscle. Drink 8 glasses of water a day. Patient Safety:  
Call light & belongings in reach. Call for help when want to walk or get OOB. Educational material given. Patient agreed to continue doing everything at home to prevent complications and have a successful recovery. Patient and family verbalized understand. Given the opportunity for asking questions. Mobility Intervention:  
 
  [] Pt dangled at edge of bed 
  [] Pt assisted OOB to bedside commode 
  [] Pt assisted OOB to chair 
  [] Pt ambulated to bathroom 
  [] Patient was ambulated in room/hallway Assistive Device Utilized:  
  
 [] Rolling walker 
 [] Crutches 
 [] Straight Cane 
 [] Knee immobilizer [] IV pole After Rounding and Checking on Patient [x] Patient left in no apparent distress sitting up in chair 
[] Patient left in no apparent distress in bed 
[x] Call bell left within reach [x] SCDs on both legs & machine turned on 
[x] Ice applied 
[] RN notified 
[] Caregiver present 
[] Bed alarm activated Reason patient not mobilized:  
 
 [] Patient refused 
 [] Nausea/vomiting 
 [] Low blood pressure 
 [] Drowsy/lethargic Pain Rating:  
 
 
Left patient with call light, cell phone and personal belongings in reach for safety.

## 2019-04-17 ENCOUNTER — OFFICE VISIT (OUTPATIENT)
Dept: ORTHOPEDIC SURGERY | Age: 84
End: 2019-04-17

## 2019-04-17 VITALS
DIASTOLIC BLOOD PRESSURE: 71 MMHG | BODY MASS INDEX: 28.44 KG/M2 | HEIGHT: 67 IN | SYSTOLIC BLOOD PRESSURE: 167 MMHG | RESPIRATION RATE: 16 BRPM | WEIGHT: 181.2 LBS | OXYGEN SATURATION: 99 % | TEMPERATURE: 97.9 F | HEART RATE: 79 BPM

## 2019-04-17 DIAGNOSIS — Z98.890 S/P LUMBAR LAMINECTOMY: Primary | ICD-10-CM

## 2019-04-17 NOTE — PROGRESS NOTES
Chief complaint/History of Present Illness:  Chief Complaint   Patient presents with    Back Pain    Leg Pain     left leg    Surgical Follow-up     HPI  Mariah Frias is a  80 y.o.  male      HISTORY OF PRESENT ILLNESS:  The patient comes in today 2 weeks status post his left L3-4, L4-5, L5-S1 laminectomy. He is doing great. His left leg pain is resolved. He is off the Percocet. His wife did report he did get confused on the Percocet a few days after he got home, but once he was off, his mentation cleared and he was back to normal.  He is diabetic. His blood sugar run about 150. He is on Cymbalta 20 mg, but is not taking it consistently. He does not feel like he really needs it now. He works in [employer] part time. He is not scheduled to return until after a 6 week visit. He is a nonsmoker. He denies fever, bowel or bladder dysfunction. PHYSICAL EXAMINATION:  Mr. Yu Romo is an 30-year-old male. He is alert and oriented. Normal mood and affect. He has a full weightbearing nonantalgic gait. No assist device. Strength 4/5 bilateral lower extremities. Negative straight leg raise. His posterior lumbar incision is healing nicely. Edge well approximated. There is no erythema or drainage or sign of infection. ASSESSMENT/PLAN:  This is a patient 2 weeks out from his left L3-4, L4-5, L5-S1 laminectomy. He is doing great and very happy with the outcome of his surgery. We went over wound care and activity level. He can come off the Cymbalta. I did tell him to wean off of it. We will see him back in 4 weeks with Dr. Kailyn Mcghee.             Review of systems:    Past Medical History:   Diagnosis Date    BPH (benign prostatic hyperplasia)     Diabetes ([address]     ED (erectile dysfunction)     GERD (gastroesophageal reflux disease)     Hypertension     Nausea & vomiting      Past Surgical History:   Procedure Laterality Date    HX BACK SURGERY  10/15/2018    HX CATARACT REMOVAL      HX ORTHOPAEDIC      back surgery    HX SHOULDER REPLACEMENT Left 2016    HX TURP       Social History     Socioeconomic History    Marital status:      Spouse name: Not on file    Number of children: Not on file    Years of education: Not on file    Highest education level: Not on file   Occupational History    Not on file   Social Needs    Financial resource strain: Not on file    Food insecurity:     Worry: Not on file     Inability: Not on file    Transportation needs:     Medical: Not on file     Non-medical: Not on file   Tobacco Use    Smoking status: Former Smoker     Types: Cigars    Smokeless tobacco: Never Used   Substance and Sexual Activity    Alcohol use: No    Drug use: No    Sexual activity: Not on file   Lifestyle    Physical activity:     Days per week: Not on file     Minutes per session: Not on file    Stress: Not on file   Relationships    Social connections:     Talks on phone: Not on file     Gets together: Not on file     Attends Congregational service: Not on file     Active member of club or organization: Not on file     Attends meetings of clubs or organizations: Not on file     Relationship status: Not on file    Intimate partner violence:     Fear of current or ex partner: Not on file     Emotionally abused: Not on file     Physically abused: Not on file     Forced sexual activity: Not on file   Other Topics Concern     Service Not Asked    Blood Transfusions Not Asked    Caffeine Concern Not Asked    Occupational Exposure Not Asked   Hayley Plate Hazards Not Asked    Sleep Concern Not Asked    Stress Concern Not Asked    Weight Concern Not Asked    Special Diet Not Asked    Back Care Not Asked    Exercise Not Asked    Bike Helmet Not Asked   [address] Not Asked    Self-Exams Not Asked   Social History Narrative    Not on file     No family history on file.     Physical Exam:  Visit Vitals  /71 (BP 1 Location: Left arm, BP Patient Position: Sitting)   Pulse 79   Temp 97.9 °F (36.6 °C) (Oral)   Resp 16   Ht 5' 6.5\" (1.689 m)   Wt 181 lb 3.2 oz (82.2 kg)   SpO2 99%   BMI 28.81 kg/m²     Pain Scale: 2/10     has been . reviewed and is appropriate        Diagnoses and all orders for this visit:    1. S/P lumbar laminectomy            Follow-up and Dispositions    · Return in about 1 month (around 5/15/2019) for with Dr Aron Ying.              We have informed Vinod Scherer to notify us for immediate appointment if he has any worsening neurogical symptoms or if an emergency situation presents, then call [phone]

## 2019-05-14 ENCOUNTER — OFFICE VISIT (OUTPATIENT)
Dept: ORTHOPEDIC SURGERY | Age: 84
End: 2019-05-14

## 2019-05-14 VITALS
RESPIRATION RATE: 17 BRPM | DIASTOLIC BLOOD PRESSURE: 75 MMHG | WEIGHT: 180.8 LBS | SYSTOLIC BLOOD PRESSURE: 158 MMHG | BODY MASS INDEX: 28.74 KG/M2 | HEART RATE: 79 BPM

## 2019-05-14 DIAGNOSIS — M48.062 LUMBAR STENOSIS WITH NEUROGENIC CLAUDICATION: ICD-10-CM

## 2019-05-14 DIAGNOSIS — Z98.890 S/P LUMBAR LAMINECTOMY: Primary | ICD-10-CM

## 2019-05-14 NOTE — PROGRESS NOTES
[facility] 
[address], [address] Phone: [phone] Fax: [phone] PROGRESS NOTE Patient: Dayday Ibarra                MRN: [id number]       SSN: [id number] YOB: 1932        AGE: 80 y.o. SEX: male Body mass index is 28.74 kg/m². PCP: Diomedes Varner MD 
05/14/19 Chief Complaint Patient presents with  Back Pain  
  post op HISTORY OF PRESENT ILLNESS, RADIOGRAPHS, and PLAN:  
 
HISTORY:  Mr. Michelle High returns today. He is six weeks out from his multilevel left laminectomy. He says he is essentially pain-free. He rates his pain as a 0/10, and he says his leg feels much better. He is up and around more. PHYSICAL EXAMINATION:  His wound is healed and dry. He is neurologically intact. ASSESSMENT/PLAN: I am thrilled with the apparent improvement he has had in his symptoms. He asked for a return to work note, and we will provide this 51-year-old man a return to work note. I will see him back again in six weeks time. cc: Raúl Clark M.D. Past Medical History:  
Diagnosis Date  BPH (benign prostatic hyperplasia)  [facility]  ED (erectile dysfunction)  GERD (gastroesophageal reflux disease)  Hypertension  Nausea & vomiting History reviewed. No pertinent family history. Current Outpatient Medications Medication Sig Dispense Refill  dutasteride (AVODART) 0.5 mg capsule Take 1 Cap by mouth daily. (Patient taking differently: Take 0.5 mg by mouth daily. To take on Monday evenings only-per patient) 90 Cap 3  
 saw palmetto 500 mg cap Take 450 mg by mouth. Indications: 2 at noon and 2 at dinner  lisinopril (PRINIVIL, ZESTRIL) 40 mg tablet Take 40 mg by mouth daily.  amLODIPine (NORVASC) 5 mg tablet Take 5 mg by mouth daily.  glipiZIDE SR (GLUCOTROL) 2.5 mg CR tablet Take  by mouth two (2) times a day.  metFORMIN (GLUCOPHAGE) 500 mg tablet Take  by mouth two (2) times daily (with meals).  esomeprazole (NEXIUM) 20 mg capsule Take  by mouth daily.  aspirin delayed-release 81 mg tablet Take  by mouth nightly.  multivitamin with iron (DAILY MULTI-VITAMINS/IRON) tablet Take 1 tablet by mouth daily.  VITAMIN E, DL,TOCOPHERYL ACET, (VITAMIN E ACETATE) 400 unit cap capsule Take  by mouth daily. Takes at noon  cinnamon bark (CINNAMON) 500 mg cap Take  by mouth two (2) times a day. Takes one tablet at noon and one tablet at dinner  gabapentin (NEURONTIN) 100 mg capsule Take 1 Cap by mouth three (3) times daily. (Patient taking differently: Take 100 mg by mouth three (3) times daily. Patient taking as needed) 90 Cap 3 Allergies Allergen Reactions  Sulfa (Sulfonamide Antibiotics) Other (comments) Patient states not allergic Past Surgical History:  
Procedure Laterality Date  HX BACK SURGERY  10/15/2018  HX CATARACT REMOVAL    
 HX ORTHOPAEDIC    
 back surgery  HX SHOULDER REPLACEMENT Left 2016  HX TURP Past Medical History:  
Diagnosis Date  BPH (benign prostatic hyperplasia)  Diabetes ([address]  ED (erectile dysfunction)  GERD (gastroesophageal reflux disease)  Hypertension  Nausea & vomiting Social History Socioeconomic History  Marital status:  Spouse name: Not on file  Number of children: Not on file  Years of education: Not on file  Highest education level: Not on file Occupational History  Not on file Social Needs  Financial resource strain: Not on file  Food insecurity:  
  Worry: Not on file Inability: Not on file  Transportation needs:  
  Medical: Not on file Non-medical: Not on file Tobacco Use  Smoking status: Former Smoker Types: Cigars  Smokeless tobacco: Never Used Substance and Sexual Activity  Alcohol use: No  
 Drug use:  No  
  Sexual activity: Not on file Lifestyle  Physical activity:  
  Days per week: Not on file Minutes per session: Not on file  Stress: Not on file Relationships  Social connections:  
  Talks on phone: Not on file Gets together: Not on file Attends Adventism service: Not on file Active member of club or organization: Not on file Attends meetings of clubs or organizations: Not on file Relationship status: Not on file  Intimate partner violence:  
  Fear of current or ex partner: Not on file Emotionally abused: Not on file Physically abused: Not on file Forced sexual activity: Not on file Other Topics Concern [address] Service Not Asked  Blood Transfusions Not Asked  Caffeine Concern Not Asked  Occupational Exposure Not Asked Ltanya Rape Hazards Not Asked  Sleep Concern Not Asked  Stress Concern Not Asked  Weight Concern Not Asked  Special Diet Not Asked  Back Care Not Asked  Exercise Not Asked  Bike Helmet Not Asked  Seat Belt Not Asked  Self-Exams Not Asked Social History Narrative  Not on file REVIEW OF SYSTEMS: 
 CONSTITUTIONAL SYMPTOMS:  Negative. EYES:  Negative. EARS, NOSE, THROAT AND MOUTH:  Negative. CARDIOVASCULAR:  Negative. RESPIRATORY:  Negative. GENITOURINARY: Per HPI. GASTROINTESTINAL:  Per HPI. INTEGUMENTARY (SKIN AND/OR BREAST):  Negative. MUSCULOSKELETAL: Per HPI. ENDOCRINE/RHEUMATOLOGIC:  Negative. NEUROLOGICAL:  Per HPI. HEMATOLOGIC/LYMPHATIC:  Negative. ALLERGIC/IMMUNOLOGIC:  Negative. PSYCHIATRIC:  Negative. PHYSICAL EXAMINATION:  
Visit Vitals /75 (BP 1 Location: Left arm, BP Patient Position: Sitting) Pulse 79 Resp 17 Wt 180 lb 12.8 oz (82 kg) BMI 28.74 kg/m² PAIN SCALE: 0 - No pain/10 CONSTITUTIONAL: The patient is in no apparent distress and is alert and oriented x 3. HEENT: Normocephalic. Hearing grossly intact. NECK: Supple and symmetric. no tenderness, or masses were felt. RESPIRATORY: No labored breathing. CARDIOVASCULAR: The carotid pulses were normal. Peripheral pulses were 2+. CHEST: Normal AP diameter and normal contour without any kyphoscoliosis. LYMPHATIC: No lymphadenopathy was appreciated in the neck, axillae or groin. SKIN:  Incision healing well, no drainage, no erythema, no hernia, no seroma, no swelling, no dehiscence, incision well approximated. Negative for scars, rashes, lesions, or ulcers on the right upper, right lower, left upper, left lower and trunk. NEUROLOGICAL: Alert and oriented x 3. Ambulation without assistive device. FWB. EXTREMITIES: See musculoskeletal. 
MUSCULOSKELETAL: 
? Head and Neck:  Negative for misalignment, asymmetry, crepitation, defects, tenderness masses or effusions. ? Left Upper Extremity: Inspection, percussion and palpation performed. Cowarts sign is negative. ? Right Upper Extremity: Inspection, percussion and palpation performed. Cowarts sign is negative. ? Spine, Ribs and Pelvis: Inspection, percussion and palpation performed. Negative for misalignment, asymmetry, crepitation, defects, tenderness masses or effusions. ? Left Lower Extremity: Inspection, percussion and palpation performed. Negative straight leg raise. ? Right Lower Extremity: Inspection, percussion and palpation performed. Negative straight leg raise. SPINE EXAM:  
 
Lumbar spine: No rash, ecchymosis, or gross obliquity. No focal atrophy is noted. ASSESSMENT 
  ICD-10-CM ICD-9-CM 1. S/P lumbar laminectomy Z98.890 V45.89 2. Lumbar stenosis with neurogenic claudication M48.062 724.03 Written by Elizabeth Horan, as dictated by Evelyne Jones MD. 
 
I, Dr. Evelyne Jones MD, confirm that all documentation is accurate.

## 2019-05-14 NOTE — LETTER
5/14/2019 8:44 AM 
 
Mr. Mariah Frias 
[address] [facility] [address] [address] To Whom It May Concern: 
 
Mariah Frias is currently under the care of [facility]. He may return to work on 5/21/19. If there are questions or concerns please have the patient contact our office.  
 
 
 
Sincerely, 
 
 
 
 
Shawna Schmid MD

## 2019-06-26 ENCOUNTER — OFFICE VISIT (OUTPATIENT)
Dept: ORTHOPEDIC SURGERY | Age: 84
End: 2019-06-26

## 2019-06-26 VITALS
HEART RATE: 64 BPM | BODY MASS INDEX: 26.81 KG/M2 | DIASTOLIC BLOOD PRESSURE: 57 MMHG | OXYGEN SATURATION: 98 % | HEIGHT: 67 IN | RESPIRATION RATE: 18 BRPM | SYSTOLIC BLOOD PRESSURE: 138 MMHG | WEIGHT: 170.8 LBS

## 2019-06-26 DIAGNOSIS — Z98.890 S/P LUMBAR LAMINECTOMY: Primary | ICD-10-CM

## 2019-06-26 NOTE — PROGRESS NOTES
Chief complaint/History of Present Illness:  Chief Complaint   Patient presents with    Back Pain     6 week Post Op     HPI  Randal Guerrero is a  80 y.o.  male      HISTORY OF PRESENT ILLNESS:  The patient comes in today almost three months out from his L3-4, L4-5, and L5-S1 laminectomy on the left. He is doing very well. He has no pain. Last year, on October 16, 2018, he had a right L3-4 laminectomy. He did very well from that also. He is able to walk better. He is very happy with the outcome of the surgery. He is able to work part-time at the ____________ [employer] as a . He is diabetic. His blood sugar is running between 99 and 110. He has started on Nutrisystem and has lost about 10-11 pounds, and that has dramatically affected his blood sugar. He does a stationary bike for exercise. He denies fever and bowel or bladder dysfunction. PHYSICAL EXAM:  Mr. Omar Camarena is an 22-year-old male. He is alert and oriented. He has a normal mood and affect. He has a full weightbearing, non-antalgic gait using no assistive device. He has 4/5 strength of the bilateral lower extremities and negative straight leg raise. ASSESSMENT/PLAN:  This is a patient almost three months out from his left L3-4, L4-5, and L5-S1 laminectomy. He has no pain. He is very happy that his leg pain is resolved. We will just see him back as needed.       Review of systems:    Past Medical History:   Diagnosis Date    BPH (benign prostatic hyperplasia)     Diabetes ([address]     ED (erectile dysfunction)     GERD (gastroesophageal reflux disease)     Hypertension     Nausea & vomiting      Past Surgical History:   Procedure Laterality Date    HX BACK SURGERY  10/15/2018    HX CATARACT REMOVAL      HX ORTHOPAEDIC      back surgery    HX SHOULDER REPLACEMENT Left 2016    HX TURP       Social History     Socioeconomic History    Marital status:      Spouse name: Not on file    Number of children: Not on file    Years of education: Not on file    Highest education level: Not on file   Occupational History    Not on file   Social Needs    Financial resource strain: Not on file    Food insecurity:     Worry: Not on file     Inability: Not on file    Transportation needs:     Medical: Not on file     Non-medical: Not on file   Tobacco Use    Smoking status: Former Smoker     Types: Cigars    Smokeless tobacco: Never Used   Substance and Sexual Activity    Alcohol use: No    Drug use: No    Sexual activity: Not on file   Lifestyle    Physical activity:     Days per week: Not on file     Minutes per session: Not on file    Stress: Not on file   Relationships    Social connections:     Talks on phone: Not on file     Gets together: Not on file     Attends Congregation service: Not on file     Active member of club or organization: Not on file     Attends meetings of clubs or organizations: Not on file     Relationship status: Not on file    Intimate partner violence:     Fear of current or ex partner: Not on file     Emotionally abused: Not on file     Physically abused: Not on file     Forced sexual activity: Not on file   Other Topics Concern     Service Not Asked    Blood Transfusions Not Asked    Caffeine Concern Not Asked    Occupational Exposure Not Asked   Chiquis Kipper Hazards Not Asked    Sleep Concern Not Asked    Stress Concern Not Asked    Weight Concern Not Asked    Special Diet Not Asked    Back Care Not Asked    Exercise Not Asked    Bike Helmet Not Asked   [address] Not Asked    Self-Exams Not Asked   Social History Narrative    Not on file     History reviewed. No pertinent family history. Physical Exam:  Visit Vitals  /57   Pulse 64   Resp 18   Ht 5' 6.5\" (1.689 m)   Wt 170 lb 12.8 oz (77.5 kg)   SpO2 98%   BMI 27.15 kg/m²     Pain Scale: 0 - No pain/10       has been . reviewed and is appropriate          Diagnoses and all orders for this visit: 1. S/P lumbar laminectomy            Follow-up and Dispositions    · Return if symptoms worsen or fail to improve.              We have informed Randal Steineeter to notify us for immediate appointment if he has any worsening neurogical symptoms or if an emergency situation presents, then call [phone]

## 2020-04-29 ENCOUNTER — TELEPHONE (OUTPATIENT)
Dept: ORTHOPEDIC SURGERY | Age: 85
End: 2020-04-29

## 2020-04-29 NOTE — TELEPHONE ENCOUNTER
We received urgent msg that pt is not doing well with increased back and leg pain and weakness in his legs.  Attempted to call pt to see what was going on no answer ANITA

## 2020-04-30 NOTE — TELEPHONE ENCOUNTER
Please offer patient an in person visit. He is having weakness per the wife. Can not do telephone or VV do to hearing aids. Wife is insistent she wants him seen in person. Discussed risks of COVID.

## 2020-05-01 ENCOUNTER — OFFICE VISIT (OUTPATIENT)
Dept: ORTHOPEDIC SURGERY | Age: 85
End: 2020-05-01

## 2020-05-01 VITALS
HEART RATE: 65 BPM | BODY MASS INDEX: 27.84 KG/M2 | SYSTOLIC BLOOD PRESSURE: 159 MMHG | OXYGEN SATURATION: 97 % | HEIGHT: 67 IN | DIASTOLIC BLOOD PRESSURE: 66 MMHG | WEIGHT: 177.4 LBS

## 2020-05-01 DIAGNOSIS — S39.012A BACK STRAIN, INITIAL ENCOUNTER: Primary | ICD-10-CM

## 2020-05-01 DIAGNOSIS — M51.26 HNP (HERNIATED NUCLEUS PULPOSUS), LUMBAR: ICD-10-CM

## 2020-05-01 DIAGNOSIS — Z98.890 S/P LUMBAR LAMINECTOMY: ICD-10-CM

## 2020-05-01 DIAGNOSIS — M48.062 LUMBAR STENOSIS WITH NEUROGENIC CLAUDICATION: ICD-10-CM

## 2020-05-01 RX ORDER — NAPROXEN 375 MG/1
375 TABLET ORAL 2 TIMES DAILY WITH MEALS
Qty: 30 TAB | Refills: 0 | Status: SHIPPED | OUTPATIENT
Start: 2020-05-01 | End: 2020-05-20 | Stop reason: ALTCHOICE

## 2020-05-01 RX ORDER — KETOROLAC TROMETHAMINE 15 MG/ML
30 INJECTION, SOLUTION INTRAMUSCULAR; INTRAVENOUS ONCE
Qty: 1 VIAL | Refills: 0
Start: 2020-05-01 | End: 2020-05-01

## 2020-05-01 RX ORDER — DICLOFENAC SODIUM 10 MG/G
GEL TOPICAL
COMMUNITY
Start: 2020-04-15 | End: 2020-05-20 | Stop reason: SDUPTHER

## 2020-05-01 RX ORDER — METHYLPREDNISOLONE 4 MG/1
TABLET ORAL
Qty: 1 DOSE PACK | Refills: 0 | Status: SHIPPED | OUTPATIENT
Start: 2020-05-01 | End: 2020-07-29 | Stop reason: ALTCHOICE

## 2020-05-01 RX ORDER — GABAPENTIN 100 MG/1
100 CAPSULE ORAL 3 TIMES DAILY
Qty: 90 CAP | Refills: 0 | Status: SHIPPED | OUTPATIENT
Start: 2020-05-01 | End: 2020-05-20 | Stop reason: SDUPTHER

## 2020-05-01 NOTE — PROGRESS NOTES
[address]  [address], [address]  [place], [address] [place] [address] Street  Phone: [phone]  Fax: [phone]  PROGRESS NOTE  Patient: Lady Negrete                MRN: [id number]       SSN: [id number]  YOB: 1932        AGE: 80 y.o. SEX: male  Body mass index is 28.2 kg/m². PCP: Carrie Garrido MD  05/01/20    Chief Complaint   Patient presents with    Leg Pain     right       HISTORY OF PRESENT ILLNESS:  Lady Negrete is a 80 y.o.  male with history of chronic back and leg pain from degenerative changes and stenosis. Prior history of back problems: previous spinal surgery - L3-4, L4-5, and L5-S1 laminectomy on the left on 4/1/2019 and October 16, 2018, he had a right L3-4 laminectomy. He was doing well with complete resolution of his leg pain until about 4-5 days ago. He was on his way to work and carrying his lunch pale and missed stepped going up a stair and fell forward on his knees and then immediately started having R low back to buttock pain radiating down the RLE in the L4-5 distribution to the knee. He reported a lot of leg weakness, today he presents with a slight abnormal gait due to the pain but no real weakness on exam w/ normal reflexes. It is more of a weakness related to pain throughout the day. His pain is sharp, shooting, aching and numbing. He is neuro intact on exam. He has no pain upon palpation of the hip joint or spinal processes. Denies bladder/bowel dysfunction, saddle paresthesia. Medications: Diclofenac gel, OTC Tylenol and Aleve with moderate, benefit    PMHx: DM, HTN      ASSESSMENT   Diagnoses and all orders for this visit:    1. Back strain, initial encounter  -     AMB POC XRAY, SPINE, LUMBOSACRAL; 4+  -     KETOROLAC TROMETHAMINE INJ  -     ketorolac (TORADOL) 15 mg/mL soln injection; 2 mL by IntraMUSCular route once for 1 dose.   -     RI THER/PROPH/DIAG INJECTION, SUBCUT/IM    2. Lumbar stenosis with neurogenic claudication  -     AMB POC XRAY, SPINE, LUMBOSACRAL; 4+  -     KETOROLAC TROMETHAMINE INJ  -     ketorolac (TORADOL) 15 mg/mL soln injection; 2 mL by IntraMUSCular route once for 1 dose. -     MI THER/PROPH/DIAG INJECTION, SUBCUT/IM    3. S/P lumbar laminectomy  -     gabapentin (Neurontin) 100 mg capsule; Take 1 Cap by mouth three (3) times daily. 4. HNP (herniated nucleus pulposus), lumbar  -     gabapentin (Neurontin) 100 mg capsule; Take 1 Cap by mouth three (3) times daily. Other orders  -     methylPREDNISolone (Medrol, Duong,) 4 mg tablet; Per dose pack instructions  -     naproxen (NAPROSYN) 375 mg tablet; Take 1 Tab by mouth two (2) times daily (with meals). IMPRESSION AND PLAN:  This is a pt with an acute back strain w/ a re-occurrence of his RLE pain after falling forward. He is neuro intact on exam other than a slight limp due to pain w/ wgt bearing that goes down the RLE     > Pt was given information on Gabapentin   > Trial of Gabapentin  > Toradol in foll by MDP foll by naproxen  > I did advise him to take this week off from work  > Mr. Sabino Cronin has a reminder for a \"due or due soon\" health maintenance. I have asked that he contact his primary care provider, Shaista Desai MD, for follow-up on this health maintenance.  > We have informed patient to notify us for immediate appointment if he has any worsening neurogical symptoms or if an emergency situation presents, then call [phone]  >  has been reviewed and is appropriate  > Pt will follow-up in 1 WK VIA TELEPHONE IF NOT IMPROVED ORDER L MRI.     Subjective    Work works a couple days a month for [employer]    Smoking Status non smoker    Pain Scale: 6/10    Pain Assessment  5/1/2020   Location of Pain Leg   Location Modifiers Right   Severity of Pain 6   Quality of Pain -   Quality of Pain Comment -   Duration of Pain Persistent   Frequency of Pain Constant   Aggravating Factors Standing;Walking   Aggravating Factors Comment -   Limiting Behavior Yes   Relieving Factors Other (Comment)   Relieving Factors Comment tylenol   Result of Injury No         REVIEW OF SYSTEMS  Constitutional: Negative for fever, chills, or weight change. Respiratory: Negative for cough or shortness of breath. Cardiovascular: Negative for chest pain or palpitations. Gastrointestinal: Negative for incontinence, acid reflux, change in bowel habits, or constipation. Genitourinary: Negative for incontinence, dysuria and flank pain. Musculoskeletal: Positive for Back and RLE pain. See HPI. Skin: Negative for rash. Neurological:RLE L4-5  radiculopathy. See HPI. Endo/Heme/Allergies: Negative. Psychiatric/Behavioral: Negative. PHYSICAL EXAMINATION  Visit Vitals  /66 (BP 1 Location: Left arm, BP Patient Position: Sitting)   Pulse 65   Ht 5' 6.5\" (1.689 m)   Wt 177 lb 6.4 oz (80.5 kg)   SpO2 97%   BMI 28.20 kg/m²         Accompanied by Spuse. Constitutional:  Well developed, well nourished, in no acute distress. Psychiatric: Affect and mood are appropriate. Integumentary: No rashes or abrasions noted on exposed areas. Cardiovascular/Peripheral Vascular: +2 radial & pedal pulses. No peripheral edema is noted. Lymphatic:  No evidence of lymphedema. No cervical lymphadenopathy. SPINE/MUSCULOSKELETAL EXAM     Lumbar spine:  No rash, ecchymosis, or gross obliquity. No fasciculations. No focal atrophy is noted. Range of motion is intact with flexion, extension, turning right, turning left. Tenderness to palpation R low back and buttock. No tenderness to palpation at the sciatic notch. SI joints non-tender. Trochanters non tender. Straight leg raise neg  Hip Impingement neg    Sensation grossly intact to light touch. MOTOR:     Hip Flex Quads Hamstrings Ankle DF EHL Ankle PF   Right +4/5 +4/5 +4/5 +4/5 +4/5 +4/5   Left +4/5 +4/5 +4/5 +4/5 +4/5 +4/5         DTRs are 1+, patella, and Achilles.      Ambulation without assistive device. FWB. Slight RLE limp        PAST MEDICAL HISTORY   Past Medical History:   Diagnosis Date    BPH (benign prostatic hyperplasia)     Diabetes ([address]     ED (erectile dysfunction)     GERD (gastroesophageal reflux disease)     Hypertension     Nausea & vomiting        Past Surgical History:   Procedure Laterality Date    HX BACK SURGERY  10/15/2018    HX CATARACT REMOVAL      HX ORTHOPAEDIC      back surgery    HX SHOULDER REPLACEMENT Left 2016    HX TURP     . MEDICATIONS      Current Outpatient Medications   Medication Sig Dispense Refill    diclofenac (VOLTAREN) 1 % gel APPLY 1 GRAM TO AFFECTED AREA THREE TIMES DAILY      ketorolac (TORADOL) 15 mg/mL soln injection 2 mL by IntraMUSCular route once for 1 dose. 1 Vial 0    gabapentin (Neurontin) 100 mg capsule Take 1 Cap by mouth three (3) times daily. 90 Cap 0    methylPREDNISolone (Medrol, Duong,) 4 mg tablet Per dose pack instructions 1 Dose Pack 0    naproxen (NAPROSYN) 375 mg tablet Take 1 Tab by mouth two (2) times daily (with meals). 30 Tab 0    dutasteride (AVODART) 0.5 mg capsule Take 1 Cap by mouth daily. (Patient taking differently: Take 0.5 mg by mouth daily. To take on Monday evenings only-per patient) 90 Cap 3    saw palmetto 500 mg cap Take 450 mg by mouth. Indications: 2 at noon and 2 at dinner      lisinopril (PRINIVIL, ZESTRIL) 40 mg tablet Take 40 mg by mouth daily.  amLODIPine (NORVASC) 5 mg tablet Take 5 mg by mouth daily.  glipiZIDE SR (GLUCOTROL) 2.5 mg CR tablet Take  by mouth two (2) times a day.  metFORMIN (GLUCOPHAGE) 500 mg tablet Take  by mouth two (2) times daily (with meals).  esomeprazole (NEXIUM) 20 mg capsule Take  by mouth two (2) times a day.  aspirin delayed-release 81 mg tablet Take  by mouth nightly.  multivitamin with iron (DAILY MULTI-VITAMINS/IRON) tablet Take 1 tablet by mouth daily.       VITAMIN E, DL,TOCOPHERYL ACET, (VITAMIN E ACETATE) 400 unit cap capsule Take  by mouth daily. Takes at noon      cinnamon bark (CINNAMON) 500 mg cap Take  by mouth two (2) times a day.  Takes one tablet at noon and one tablet at dinner          ALLERGIES    Allergies   Allergen Reactions    Sulfa (Sulfonamide Antibiotics) Other (comments)     Patient states not allergic          SOCIAL HISTORY    Social History     Socioeconomic History    Marital status:      Spouse name: Not on file    Number of children: Not on file    Years of education: Not on file    Highest education level: Not on file   Occupational History    Not on file   Social Needs    Financial resource strain: Not on file    Food insecurity     Worry: Not on file     Inability: Not on file    Transportation needs     Medical: Not on file     Non-medical: Not on file   Tobacco Use    Smoking status: Former Smoker     Types: Cigars    Smokeless tobacco: Never Used   Substance and Sexual Activity    Alcohol use: No    Drug use: No    Sexual activity: Not on file   Lifestyle    Physical activity     Days per week: Not on file     Minutes per session: Not on file    Stress: Not on file   Relationships    Social connections     Talks on phone: Not on file     Gets together: Not on file     Attends Rastafarian service: Not on file     Active member of club or organization: Not on file     Attends meetings of clubs or organizations: Not on file     Relationship status: Not on file    Intimate partner violence     Fear of current or ex partner: Not on file     Emotionally abused: Not on file     Physically abused: Not on file     Forced sexual activity: Not on file   Other Topics Concern     Service Not Asked    Blood Transfusions Not Asked    Caffeine Concern Not Asked    Occupational Exposure Not Asked   Veto Risk Hazards Not Asked    Sleep Concern Not Asked    Stress Concern Not Asked    Weight Concern Not Asked    Special Diet Not Asked    Back Care Not Asked    Exercise Not Asked   [employer] Helmet Not Asked   [address] [facility] Not Asked    Self-Exams Not Asked   Social History Narrative    Not on file       FAMILY HISTORY  No family history on file.       Nahid Griffin, NP

## 2020-05-01 NOTE — PATIENT INSTRUCTIONS
Gabapentin (By mouth) Gabapentin (ronnie-a-PEN-tin) Treats seizures and pain caused by shingles. Brand Name(s): ACTIVE-PAC with Gabapentin, Convenience Duong, Cyclo/Carla 10/300 Pack, [employer], Carla-V, Gralise, Gralise Starter Pack, Neurontin, [employer] Kit There may be other brand names for this medicine. When This Medicine Should Not Be Used: This medicine is not right for everyone. Do not use it if you had an allergic reaction to gabapentin. How to Use This Medicine:  
Capsule, Liquid, Tablet · Take your medicine as directed. Your dose may need to be changed several times to find what works best for you. If you have epilepsy, do not allow more than 12 hours to pass between doses. · Capsule: Swallow the capsule whole with plenty of water. Do not open, crush, or chew it. · Gralise® tablet: Swallow the tablet whole . Do not crush, break, or chew it. · Neurontin® tablet: If you break a tablet into 2 pieces, use the second half as your next dose. If you don't use it within 28 days, throw it away. · Measure the oral liquid medicine with a marked measuring spoon, oral syringe, or medicine cup. · This medicine should come with a Medication Guide. Ask your pharmacist for a copy if you do not have one. · Missed dose: Take a dose as soon as you remember. If it is almost time for your next dose, wait until then and take a regular dose. Do not take extra medicine to make up for a missed dose. · Store the medicine in a closed container at room temperature, away from heat, moisture, and direct light. Store the Neurontin® oral liquid in the refrigerator. Do not freeze. Drugs and Foods to Avoid: Ask your doctor or pharmacist before using any other medicine, including over-the-counter medicines, vitamins, and herbal products. · Some medicines can affect how gabapentin works. Tell your doctor if you also use any of the following: ¨ Hydrocodone ¨ Morphine · If you take an antacid, wait at least 2 hours before you take gabapentin. · Tell your doctor if you use anything else that makes you sleepy. Some examples are allergy medicine, narcotic pain medicine, and alcohol. Warnings While Using This Medicine: · Tell your doctor if you are pregnant or breastfeeding, or if you have kidney problems or are receiving dialysis. Tell your doctor if you have a history of depression or mental health problems. · This medicine may increase depression or thoughts of suicide. Tell your doctor right away if you start to feel more depressed or think about hurting yourself. · This medicine may cause a serious allergic reaction called multiorgan hypersensitivity, which can damage organs and be life-threatening. · Do not stop using this medicine suddenly. Your doctor will need to slowly decrease your dose before you stop it completely. If you take this medicine to prevent seizures, your seizures may return or occur more often if you stop this medicine suddenly. · This medicine may make you dizzy or drowsy. Do not drive or do anything else that could be dangerous until you know how this medicine affects you. · Tell any doctor or dentist who treats you that you are using this medicine. This medicine may affect certain medical test results. · Your doctor will check your progress and the effects of this medicine at regular visits. Keep all appointments. · Keep all medicine out of the reach of children. Never share your medicine with anyone. Possible Side Effects While Using This Medicine:  
Call your doctor right away if you notice any of these side effects: · Allergic reaction: Itching or hives, swelling in your face or hands, swelling or tingling in your mouth or throat, chest tightness, trouble breathing · Behavior problems, aggression, restlessness, trouble concentrating, moodiness (especially in children) · Blistering, peeling, red skin rash · Change in how much or how often you urinate, bloody or cloudy urine, 
· Chest pain, fast heartbeat, trouble breathing · Dark urine or pale stools, nausea, vomiting, loss of appetite, stomach pain, yellow skin or eyes · Fever, rash, swollen or tender glands in the neck, armpit, or groin · Problems with coordination, shakiness, unsteadiness · Rapid weight gain, swelling in your hands, ankles, or feet · Unusual moods or behaviors, thoughts of hurting yourself, feeling depressed If you notice these less serious side effects, talk with your doctor: · Dizziness, drowsiness, sleepiness, tiredness If you notice other side effects that you think are caused by this medicine, tell your doctor. Call your doctor for medical advice about side effects. You may report side effects to FDA at 6-371-FDA-0116 © 2017 [facility] Information is for End User's use only and may not be sold, redistributed or otherwise used for commercial purposes. The above information is an  only. It is not intended as medical advice for individual conditions or treatments. Talk to your doctor, nurse or pharmacist before following any medical regimen to see if it is safe and effective for you.

## 2020-05-20 ENCOUNTER — VIRTUAL VISIT (OUTPATIENT)
Dept: ORTHOPEDIC SURGERY | Age: 85
End: 2020-05-20

## 2020-05-20 DIAGNOSIS — Z98.890 S/P LUMBAR LAMINECTOMY: ICD-10-CM

## 2020-05-20 DIAGNOSIS — M48.062 LUMBAR STENOSIS WITH NEUROGENIC CLAUDICATION: Primary | ICD-10-CM

## 2020-05-20 DIAGNOSIS — M51.26 HNP (HERNIATED NUCLEUS PULPOSUS), LUMBAR: ICD-10-CM

## 2020-05-20 DIAGNOSIS — M54.50 LUMBAR PAIN: ICD-10-CM

## 2020-05-20 RX ORDER — OMEPRAZOLE 20 MG/1
CAPSULE, DELAYED RELEASE ORAL
COMMUNITY
Start: 2020-03-09

## 2020-05-20 RX ORDER — GABAPENTIN 100 MG/1
100 CAPSULE ORAL 3 TIMES DAILY
Qty: 90 CAP | Refills: 1 | Status: SHIPPED | OUTPATIENT
Start: 2020-05-20 | End: 2020-07-29 | Stop reason: ALTCHOICE

## 2020-05-20 RX ORDER — METHOCARBAMOL 500 MG/1
500 TABLET, FILM COATED ORAL
Qty: 60 TAB | Refills: 0 | Status: SHIPPED | OUTPATIENT
Start: 2020-05-20 | End: 2020-06-08

## 2020-05-20 RX ORDER — DICLOFENAC SODIUM 10 MG/G
GEL TOPICAL
Qty: 500 G | Refills: 1 | Status: SHIPPED | OUTPATIENT
Start: 2020-05-20 | End: 2021-02-15

## 2020-05-20 NOTE — PROGRESS NOTES
HPI  Cesar Ferrer is a 80 y.o. male who was seen by telephone due to pt not having access to an electronic device with a camera and microphone, patient's location home, provider's location [facility] with the patient's verbal consent-with the understanding that charges maybe billed for the visit. This visit was conducted using the telephone platform on 5/20/2020 for     Chief Complaint   Patient presents with    Back Pain     Follow up low back, same pain as it was before his surgery    Leg Pain     RLE       Additional Participants during visit: spouse    Cesar Ferrer is a 80 y.o.  male with history of chronic back and leg pain from degenerative changes and stenosis. Prior history of back problems: previous spinal surgery - L3-4, L4-5, and L5-S1 laminectomy on the left on 4/1/2019 and October 16, 2018, he had a right L3-4 laminectomy. He was doing well with complete resolution of his leg pain until about 3wks ago. He was on his way to work and carrying his lunch pale and missed stepped going up a stair and fell forward on his knees and then immediately started having R low back to buttock pain radiating down the RLE in the L4-5 distribution to the knee. He reported a lot of leg weakness. I saw him 2 wks ago and gave him a Toradol inj foll by a MDP and some Naproxen and Gabapentin. Today, he reports that he hasn't had any improvement and his RLE pain is actually extending going down the entire leg. The Gabapentin initially gave him some dizziness but that is subsiding now. He rates his pain as an 8/10. He tried injections prior to his previous surgeries that never helped.     Denies bladder/bowel dysfunction, saddle paresthesia.      Medications: Gabapentin 100mg TID. Diclofenac gel, OTC Tylenol and Aleve with moderate, benefit     PMHx: DM, HTN    Assessment & Plan:   Diagnoses and all orders for this visit:    1.  Lumbar stenosis with neurogenic claudication  -     MRI LUMB SPINE W WO CONT; Future    2. Lumbar pain  -     MRI LUMB SPINE W WO CONT; Future    3. S/P lumbar laminectomy  -     gabapentin (Neurontin) 100 mg capsule; Take 1 Cap by mouth three (3) times daily. 4. HNP (herniated nucleus pulposus), lumbar  -     gabapentin (Neurontin) 100 mg capsule; Take 1 Cap by mouth three (3) times daily. Other orders  -     methocarbamoL (Robaxin) 500 mg tablet; Take 1 Tab by mouth three (3) times daily as needed for Muscle Spasm(s). -     diclofenac (VOLTAREN) 1 % gel; APPLY 1 GRAM TO AFFECTED AREA THREE TIMES DAILY        This is a pt with lumbar stenosis and new occurring RLE pain for the last 3 wks unresponsive to steroids, NSAIDs and anti-neuritics. He had injections in the past and doesn't wish to consider those. He would like Dr Bennie Mauro opinion on additional surgery. I am going to update his L MRI and provide him w/ a mild muscle relaxer. > Pt was given information on L MRI   > L MRI  > Continue Gabapentin may take 100/200  > PRN Robaxin and Diclofenac gel  > Pt will f/o with Dr Mateusz Obrien after the MRI  > Mr. Rajesh Angela has a reminder for a \"due or due soon\" health maintenance.  I have asked that he contact his primary care provider, Elena Santos MD, for follow-up on this health maintenance.  > We have informed patient to notify us for immediate appointment if he has any worsening neurogical symptoms or if an emergency situation presents, then call 911  >  has been reviewed and is appropriate        CPT Codes 04766-78743 for Established Patients may apply to this Telehealth Visit  Time-based coding, delete if not needed: I spent at least 15 minutes with this established patient, and >50% of the time was spent counseling and/or coordinating care regarding Back and leg pain  Start Time: 0840  End Time: 0855    Subjective:   Francis Youssef was seen for Back Pain (Follow up low back, same pain as it was before his surgery) and Leg Pain (RLE)      Prior to Admission medications Medication Sig Start Date End Date Taking? Authorizing Provider   omeprazole (PRILOSEC) 20 mg capsule TAKE 1 CAP BY MOUTH ONCE A DAY   TAKE 30 MINS PRIOR TO A MEAL 3/9/20  Yes Provider, Historical   methocarbamoL (Robaxin) 500 mg tablet Take 1 Tab by mouth three (3) times daily as needed for Muscle Spasm(s). 5/20/20  Yes Maxi Mott NP   diclofenac (VOLTAREN) 1 % gel APPLY 1 GRAM TO AFFECTED AREA THREE TIMES DAILY 5/20/20  Yes Maxi Mott NP   gabapentin (Neurontin) 100 mg capsule Take 1 Cap by mouth three (3) times daily. 5/20/20  Yes Maxi Mott NP   dutasteride (AVODART) 0.5 mg capsule Take 1 Cap by mouth daily. Patient taking differently: Take 0.5 mg by mouth daily. To take on Monday evenings only-per patient 2/20/19  Yes Shanell Brand MD   saw palmetto 500 mg cap Take 450 mg by mouth. Indications: 2 at noon and 2 at dinner   Yes Provider, Historical   lisinopril (PRINIVIL, ZESTRIL) 40 mg tablet Take 40 mg by mouth daily. Yes Provider, Historical   amLODIPine (NORVASC) 5 mg tablet Take 5 mg by mouth daily. Yes Provider, Historical   glipiZIDE SR (GLUCOTROL) 2.5 mg CR tablet Take  by mouth two (2) times a day. Yes Provider, Historical   metFORMIN (GLUCOPHAGE) 500 mg tablet Take  by mouth two (2) times daily (with meals). Yes Provider, Historical   aspirin delayed-release 81 mg tablet Take  by mouth nightly. Yes Provider, Historical   multivitamin with iron (DAILY MULTI-VITAMINS/IRON) tablet Take 1 tablet by mouth daily. Yes Provider, Historical   VITAMIN E, DL,TOCOPHERYL ACET, (VITAMIN E ACETATE) 400 unit cap capsule Take  by mouth daily. Takes at noon   Yes Provider, Historical   cinnamon bark (CINNAMON) 500 mg cap Take  by mouth two (2) times a day.  Takes one tablet at noon and one tablet at dinner   Yes Provider, Historical   methylPREDNISolone (Medrol, Duong,) 4 mg tablet Per dose pack instructions 5/1/20   Maxi Mott NP   diclofenac (VOLTAREN) 1 % gel APPLY 1 GRAM TO AFFECTED AREA THREE TIMES DAILY 4/15/20 5/20/20  Provider, Historical   gabapentin (Neurontin) 100 mg capsule Take 1 Cap by mouth three (3) times daily. 5/1/20 5/20/20  Alayna Guerrero NP   naproxen (NAPROSYN) 375 mg tablet Take 1 Tab by mouth two (2) times daily (with meals). 5/1/20 5/20/20  Alayna Guerrero NP   esomeprazole (NEXIUM) 20 mg capsule Take  by mouth two (2) times a day.     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 M54.40, G89.29    Right leg weakness R29.898    Therapeutic drug monitoring Z51.81    Long term current use of opiate analgesic Z79.891    DDD (degenerative disc disease), lumbar M51.36    Lumbar post-laminectomy syndrome M96.1    Lumbar neuritis M54.16    Lumbar stenosis without neurogenic claudication M48.061    Lumbosacral spondylosis without myelopathy M47.817    Lumbar disc herniation M51.26    Spinal stenosis M48.00     Patient Active Problem List    Diagnosis Date Noted    Spinal stenosis 04/01/2019    Lumbar disc herniation 10/15/2018    DDD (degenerative disc disease), lumbar 09/12/2018    Lumbar post-laminectomy syndrome 09/12/2018    Lumbar neuritis 09/12/2018    Lumbar stenosis without neurogenic claudication 09/12/2018    Lumbosacral spondylosis without myelopathy 09/12/2018    Therapeutic drug monitoring 09/06/2018    Long term current use of opiate analgesic 09/06/2018    Chronic right-sided low back pain with sciatica 08/29/2018    Right leg weakness 08/29/2018    Hypertension     ED (erectile dysfunction)      Current Outpatient Medications   Medication Sig Dispense Refill    omeprazole (PRILOSEC) 20 mg capsule TAKE 1 CAP BY MOUTH ONCE A DAY   TAKE 30 MINS PRIOR TO A MEAL      methocarbamoL (Robaxin) 500 mg tablet Take 1 Tab by mouth three (3) times daily as needed for Muscle Spasm(s). 60 Tab 0    diclofenac (VOLTAREN) 1 % gel APPLY 1 GRAM TO AFFECTED AREA THREE TIMES DAILY 500 g 1    gabapentin (Neurontin) 100 mg capsule Take 1 Cap by mouth three (3) times daily. 90 Cap 1    dutasteride (AVODART) 0.5 mg capsule Take 1 Cap by mouth daily. (Patient taking differently: Take 0.5 mg by mouth daily. To take on Monday evenings only-per patient) 90 Cap 3    saw palmetto 500 mg cap Take 450 mg by mouth. Indications: 2 at noon and 2 at dinner      lisinopril (PRINIVIL, ZESTRIL) 40 mg tablet Take 40 mg by mouth daily.  amLODIPine (NORVASC) 5 mg tablet Take 5 mg by mouth daily.  glipiZIDE SR (GLUCOTROL) 2.5 mg CR tablet Take  by mouth two (2) times a day.  metFORMIN (GLUCOPHAGE) 500 mg tablet Take  by mouth two (2) times daily (with meals).  aspirin delayed-release 81 mg tablet Take  by mouth nightly.  multivitamin with iron (DAILY MULTI-VITAMINS/IRON) tablet Take 1 tablet by mouth daily.  VITAMIN E, DL,TOCOPHERYL ACET, (VITAMIN E ACETATE) 400 unit cap capsule Take  by mouth daily. Takes at noon      cinnamon bark (CINNAMON) 500 mg cap Take  by mouth two (2) times a day. Takes one tablet at noon and one tablet at dinner      methylPREDNISolone (Medrol, Duong,) 4 mg tablet Per dose pack instructions 1 Dose Pack 0    esomeprazole (NEXIUM) 20 mg capsule Take  by mouth two (2) times a day.        Allergies   Allergen Reactions    Sulfa (Sulfonamide Antibiotics) Other (comments)     Patient states not allergic     Past Medical History:   Diagnosis Date    BPH (benign prostatic hyperplasia)     Diabetes ([address]     ED (erectile dysfunction)     GERD (gastroesophageal reflux disease)     Hypertension     Nausea & vomiting      Past Surgical History:   Procedure Laterality Date    HX BACK SURGERY  10/15/2018    HX CATARACT REMOVAL      HX ORTHOPAEDIC      back surgery    HX SHOULDER REPLACEMENT Left 2016    HX TURP            ROS  REVIEW OF SYSTEMS  Constitutional: Negative for fever, chills, or weight change. Respiratory: Negative for cough or shortness of breath. Cardiovascular: Negative for chest pain or palpitations. Gastrointestinal: Negative for incontinence, acid reflux, change in bowel habits, or constipation. Genitourinary: Negative for incontinence, dysuria and flank pain. Musculoskeletal: Positive for back and RLE pain. See HPI. Skin: Negative for rash. Neurological:RLE L4-5  radiculopathy. See HPI. Endo/Heme/Allergies: Negative. Psychiatric/Behavioral: Negative. Objective:                         NONE Telephone visit  Due to this being a TeleHealth evaluation, many elements of the physical examination are unable to be assessed. We discussed the expected course, resolution and complications of the diagnosis(es) in detail. Medication risks, benefits, costs, interactions, and alternatives were discussed as indicated. I advised him to contact the office if his condition worsens, changes or fails to improve as anticipated. He expressed understanding with the diagnosis(es) and plan. Pursuant to the emergency declaration under the [facility], [facility], [facility] waiver authority and the [employer] and [employer] Act, this Virtual  Visit was conducted, with patient's consent, to reduce the patient's risk of exposure to COVID-19 and provide continuity of care for an established patient. Services were provided through a video synchronous discussion virtually to substitute for in-person clinic visit.     Balwinder Bell NP

## 2020-05-26 ENCOUNTER — DOCUMENTATION ONLY (OUTPATIENT)
Dept: ORTHOPEDIC SURGERY | Age: 85
End: 2020-05-26

## 2020-05-26 NOTE — PROGRESS NOTES
Ms. Robin Pa has called and requested the order for a MRI of the Lumbar Spine with and without contrast to be faxed to [facility] for scheduling. I have faxed the order and a lab request to . No Medicare pre-authorization is required.

## 2020-06-08 RX ORDER — METHOCARBAMOL 500 MG/1
TABLET, FILM COATED ORAL
Qty: 60 TAB | Refills: 0 | Status: SHIPPED | OUTPATIENT
Start: 2020-06-08 | End: 2020-07-29 | Stop reason: ALTCHOICE

## 2020-06-16 ENCOUNTER — OFFICE VISIT (OUTPATIENT)
Dept: ORTHOPEDIC SURGERY | Age: 85
End: 2020-06-16

## 2020-06-16 VITALS
SYSTOLIC BLOOD PRESSURE: 171 MMHG | HEIGHT: 67 IN | OXYGEN SATURATION: 98 % | TEMPERATURE: 98.6 F | BODY MASS INDEX: 27.78 KG/M2 | DIASTOLIC BLOOD PRESSURE: 75 MMHG | RESPIRATION RATE: 16 BRPM | WEIGHT: 177 LBS | HEART RATE: 64 BPM

## 2020-06-16 DIAGNOSIS — M48.062 LUMBAR STENOSIS WITH NEUROGENIC CLAUDICATION: Primary | ICD-10-CM

## 2020-06-16 DIAGNOSIS — Z98.890 S/P LUMBAR LAMINECTOMY: ICD-10-CM

## 2020-06-16 NOTE — PROGRESS NOTES
[address]  [address], [address]  [place], [address] [place] [address] Street  Phone: [phone]  Fax: [phone]  PROGRESS NOTE  Patient: Cortney Snyder                MRN: [id number]       SSN: [id number]  YOB: 1932        AGE: 80 y.o. SEX: male  Body mass index is 28.14 kg/m². PCP: Giovanny Cabral MD  06/16/20    Chief Complaint   Patient presents with    Back Pain     lower       HISTORY OF PRESENT ILLNESS, RADIOGRAPHS, and PLAN:     Returns today I known from caring for his low back he has had 2 previous decompressive surgeries in his lumbar spine he was doing well and necessary process pain-free when he had his hands fall and tripped on something took a hard fall onto his knees. Following that fall he has had pain that goes from his right buttock into his right leg down his right leg. He denies any neurologic dysfunction it is generally controlled by taking some Neurontin as well as arthritis strength Tylenol morning and night. Physical exam demonstrates normal strength and sensation he has some antalgic range of motion to his lumbar spine MRI recently done of his lumbar spine demonstrates postlaminectomy changes L3 10811 there is some lateral recess stenosis at L4-5 but that the segment that I think is essentially fused there may be some recurrent foraminal stenosis at 3 4 and/or 5 1. It is difficult to say given his age and postsurgical changes to his spine. At this time he has no nerve tension signs and just this episodic radicular pain I explained to him that if he could get by with just taking his extra arthritis strength Tylenol twice a day in addition to Neurontin which will have him just take at night I would try to avoid surgery and just give things more time. I am not excited at the concept of more surgery for him. Would likely have to reexplore all 3 levels given the indeterminate findings on the MRI.   With any back surgery especially in elderly gentleman's third the risk of being made worse insignificant. At this point I will see him back again in a couple months to monitor his progress. This dictation was created utilizing voice recognition software. Errors may be present. Past Medical History:   Diagnosis Date    BPH (benign prostatic hyperplasia)     Diabetes ([address]     ED (erectile dysfunction)     GERD (gastroesophageal reflux disease)     Hypertension     Nausea & vomiting        History reviewed. No pertinent family history. Current Outpatient Medications   Medication Sig Dispense Refill    methocarbamoL (ROBAXIN) 500 mg tablet TAKE 1 TABLET BY MOUTH THREE TIMES A DAY AS NEEDED FOR MUSCLE SPASMS 60 Tab 0    omeprazole (PRILOSEC) 20 mg capsule TAKE 1 CAP BY MOUTH ONCE A DAY   TAKE 30 MINS PRIOR TO A MEAL      diclofenac (VOLTAREN) 1 % gel APPLY 1 GRAM TO AFFECTED AREA THREE TIMES DAILY 500 g 1    gabapentin (Neurontin) 100 mg capsule Take 1 Cap by mouth three (3) times daily. 90 Cap 1    methylPREDNISolone (Medrol, Duong,) 4 mg tablet Per dose pack instructions 1 Dose Pack 0    dutasteride (AVODART) 0.5 mg capsule Take 1 Cap by mouth daily. (Patient taking differently: Take 0.5 mg by mouth daily. To take on Monday evenings only-per patient) 90 Cap 3    saw palmetto 500 mg cap Take 450 mg by mouth. Indications: 2 at noon and 2 at dinner      lisinopril (PRINIVIL, ZESTRIL) 40 mg tablet Take 40 mg by mouth daily.  amLODIPine (NORVASC) 5 mg tablet Take 5 mg by mouth daily.  glipiZIDE SR (GLUCOTROL) 2.5 mg CR tablet Take  by mouth two (2) times a day.  metFORMIN (GLUCOPHAGE) 500 mg tablet Take  by mouth two (2) times daily (with meals).  aspirin delayed-release 81 mg tablet Take  by mouth nightly.  multivitamin with iron (DAILY MULTI-VITAMINS/IRON) tablet Take 1 tablet by mouth daily.  VITAMIN E, DL,TOCOPHERYL ACET, (VITAMIN E ACETATE) 400 unit cap capsule Take  by mouth daily.  Takes at noon  cinnamon bark (CINNAMON) 500 mg cap Take  by mouth two (2) times a day. Takes one tablet at noon and one tablet at dinner      esomeprazole (NEXIUM) 20 mg capsule Take  by mouth two (2) times a day.          Allergies   Allergen Reactions    Sulfa (Sulfonamide Antibiotics) Other (comments)     Patient states not allergic       Past Surgical History:   Procedure Laterality Date    HX BACK SURGERY  10/15/2018    HX CATARACT REMOVAL      HX ORTHOPAEDIC      back surgery    HX SHOULDER REPLACEMENT Left 2016    HX TURP         Past Medical History:   Diagnosis Date    BPH (benign prostatic hyperplasia)     Diabetes ([address]     ED (erectile dysfunction)     GERD (gastroesophageal reflux disease)     Hypertension     Nausea & vomiting        Social History     Socioeconomic History    Marital status:      Spouse name: Not on file    Number of children: Not on file    Years of education: Not on file    Highest education level: Not on file   Occupational History    Not on file   Social Needs    Financial resource strain: Not on file    Food insecurity     Worry: Not on file     Inability: Not on file    Transportation needs     Medical: Not on file     Non-medical: Not on file   Tobacco Use    Smoking status: Former Smoker     Types: Cigars    Smokeless tobacco: Never Used   Substance and Sexual Activity    Alcohol use: No    Drug use: No    Sexual activity: Not on file   Lifestyle    Physical activity     Days per week: Not on file     Minutes per session: Not on file    Stress: Not on file   Relationships    Social connections     Talks on phone: Not on file     Gets together: Not on file     Attends Restorationism service: Not on file     Active member of club or organization: Not on file     Attends meetings of clubs or organizations: Not on file     Relationship status: Not on file    Intimate partner violence     Fear of current or ex partner: Not on file     Emotionally abused: Not on file     Physically abused: Not on file     Forced sexual activity: Not on file   Other Topics Concern     Service Not Asked    Blood Transfusions Not Asked    Caffeine Concern Not Asked    Occupational Exposure Not Asked    Hobby Hazards Not Asked    Sleep Concern Not Asked    Stress Concern Not Asked    Weight Concern Not Asked    Special Diet Not Asked    Back Care Not Asked    Exercise Not Asked    Bike Helmet Not Asked   [address] Not Asked    Self-Exams Not Asked   Social History Narrative    Not on file         REVIEW OF SYSTEMS:   CONSTITUTIONAL SYMPTOMS:  Negative. EYES:  Negative. EARS, NOSE, THROAT AND MOUTH:  Negative. CARDIOVASCULAR:  Negative. RESPIRATORY:  Negative. GENITOURINARY: Per HPI. GASTROINTESTINAL:  Per HPI. INTEGUMENTARY (SKIN AND/OR BREAST):  Negative. MUSCULOSKELETAL: Per HPI.   ENDOCRINE/RHEUMATOLOGIC:  Negative. NEUROLOGICAL:  Per HPI. HEMATOLOGIC/LYMPHATIC:  Negative. ALLERGIC/IMMUNOLOGIC:  Negative. PSYCHIATRIC:  Negative. PHYSICAL EXAMINATION:   Visit Vitals  /75 (BP 1 Location: Left arm, BP Patient Position: Sitting)   Pulse 64   Temp 98.6 °F (37 °C) (Skin)   Resp 16   Ht 5' 6.5\" (1.689 m)   Wt 177 lb (80.3 kg)   SpO2 98%   BMI 28.14 kg/m²    PAIN SCALE: 8/10    CONSTITUTIONAL: The patient is in no apparent distress and is alert and oriented x 3. HEENT: Normocephalic. Hearing grossly intact. NECK: Supple and symmetric. no tenderness, or masses were felt. RESPIRATORY: No labored breathing. CARDIOVASCULAR: The carotid pulses were normal. Peripheral pulses were 2+. CHEST: Normal AP diameter and normal contour without any kyphoscoliosis. LYMPHATIC: No lymphadenopathy was appreciated in the neck, axillae or groin. SKIN: Negative for scars, rashes, lesions, or ulcers on the right upper, right lower, left upper, left lower and trunk. NEUROLOGICAL: Alert and oriented x 3. Ambulation without assistive device. FWB. EXTREMITIES: See musculoskeletal.  MUSCULOSKELETAL:   Head and Neck: Negative for misalignment, asymmetry, crepitation, defects, tenderness masses or effusions.  Left Upper Extremity: Inspection, percussion and palpation performed. Cowarts sign is negative.  Right Upper Extremity: Inspection, percussion and palpation performed. Cowarts sign is negative.  Spine, Ribs and Pelvis: Buttock pain radiating to lateral RLE. Inspection, percussion and palpation performed. Negative for misalignment, asymmetry, crepitation, defects, tenderness masses or effusions.  Left Lower Extremity: Inspection, percussion and palpation performed. Negative straight leg raise.  Right Lower Extremity: Radiating pain to ankle. Inspection, percussion and palpation performed. Negative straight leg raise. SPINE EXAM:     Lumbar spine: No rash, ecchymosis, or gross obliquity. No focal atrophy is noted. ASSESSMENT    ICD-10-CM ICD-9-CM    1. Lumbar stenosis with neurogenic claudication M48.062 724.03    2. S/P lumbar laminectomy J25.949 V45.89        Written by Consuelo Mulligan, as dictated by Rola Otto MD.    I, Dr. Rola Otto MD, confirm that all documentation is accurate.

## 2020-06-16 NOTE — LETTER
6/16/20 Patient: Hayden Corrales YOB: 1932 Date of Visit: 6/16/2020 Crow Perez MD 
[facility] [address] [address] VIA Facsimile: [phone] Dear Crow Perez MD, Thank you for referring Mr. Maceo Claude to [facility] for evaluation. My notes for this consultation are attached. If you have questions, please do not hesitate to call me. I look forward to following your patient along with you.  
 
 
Sincerely, 
 
Zoë Dobson MD

## 2020-06-23 DIAGNOSIS — M48.062 LUMBAR STENOSIS WITH NEUROGENIC CLAUDICATION: ICD-10-CM

## 2020-06-23 DIAGNOSIS — M54.50 LUMBAR PAIN: ICD-10-CM

## 2020-07-29 ENCOUNTER — OFFICE VISIT (OUTPATIENT)
Dept: ORTHOPEDIC SURGERY | Age: 85
End: 2020-07-29

## 2020-07-29 VITALS
HEART RATE: 66 BPM | DIASTOLIC BLOOD PRESSURE: 74 MMHG | WEIGHT: 171.8 LBS | TEMPERATURE: 98.1 F | OXYGEN SATURATION: 99 % | SYSTOLIC BLOOD PRESSURE: 154 MMHG | RESPIRATION RATE: 18 BRPM | HEIGHT: 67 IN | BODY MASS INDEX: 26.97 KG/M2

## 2020-07-29 DIAGNOSIS — M54.16 LUMBAR RADICULOPATHY: ICD-10-CM

## 2020-07-29 DIAGNOSIS — M48.062 LUMBAR STENOSIS WITH NEUROGENIC CLAUDICATION: Primary | ICD-10-CM

## 2020-07-29 RX ORDER — GABAPENTIN 100 MG/1
CAPSULE ORAL
Qty: 120 CAP | Refills: 5 | Status: SHIPPED | OUTPATIENT
Start: 2020-07-29 | End: 2021-02-03 | Stop reason: SINTOL

## 2020-07-29 NOTE — PROGRESS NOTES
Chief complaint   Chief Complaint   Patient presents with    Back Pain     6 month follow up low back    Hip Pain     right hip       History of Present Illness:  Marshall Kaur is a  80 y.o.  male comes in today for follow-up of back pain with radiating right buttock and hip pain that radiates down his leg to about mid shin. He is status post a right L3-4 laminectomy October 2018 followed by a left L3 S1 laminectomy 2019. Had a fall that started with back pain and radiating. He was evaluated by Dr. Alba Andino at his last visit do not feel like surgery was indicated. The MRI did show some lateral recess stenosis at L4-5 but not enough to have surgery for. The patient states he continues to have low back pain with radiating right lower extremity pain and that the Neurontin 100 mg 1 in the morning and 2 at night does seem to help. At one point he was taking 2 capsules twice a day, but 2 capsules at once during the day very drowsy so he cut back to 1 during the day and 2 at night. He states this does help but it does not really last all day and he gets breakthrough pain. He is also taking 2 Tylenol arthritis strength twice a day which also helps. He works at a [employer] center part-time. He is a non-smoker. He denies fever bowel bladder dysfunction. No new medical issues. He is diabetic and his blood sugar was 109 when he checked it this morning. Physical Exam: The patient is a 59-year-old male well-developed well-nourished who is alert and oriented with a normal mood and affect. He has a full weightbearing antalgic gait. He does not use any assistive device. He has 4 out of 5 strength of his bilateral lower extremities. Negative straight leg raise. He has pain with hyperextension lumbar spine. Assessment and Plan: This is a patient who has had 2 laminectomies over the last couple years and had a fall causing recurrent back and radiating right leg pain.   He does have known stenosis at L4-5.  I can increase his gabapentin to 100 mg twice a day and 200 mg at bedtime. Hopefully this will give him better coverage. He will continue his arthritis strength Tylenol as needed. We will see him back in 6 months, sooner if needed.         Review of systems:    Past Medical History:   Diagnosis Date    BPH (benign prostatic hyperplasia)     Diabetes ([address]     ED (erectile dysfunction)     GERD (gastroesophageal reflux disease)     Hypertension     Nausea & vomiting      Past Surgical History:   Procedure Laterality Date    HX BACK SURGERY  10/15/2018    HX CATARACT REMOVAL      HX ORTHOPAEDIC      back surgery    HX SHOULDER REPLACEMENT Left 2016    HX TURP       Social History     Socioeconomic History    Marital status:      Spouse name: Not on file    Number of children: Not on file    Years of education: Not on file    Highest education level: Not on file   Occupational History    Not on file   Social Needs    Financial resource strain: Not on file    Food insecurity     Worry: Not on file     Inability: Not on file    Transportation needs     Medical: Not on file     Non-medical: Not on file   Tobacco Use    Smoking status: Former Smoker     Types: Cigars    Smokeless tobacco: Never Used   Substance and Sexual Activity    Alcohol use: No    Drug use: No    Sexual activity: Not on file   Lifestyle    Physical activity     Days per week: Not on file     Minutes per session: Not on file    Stress: Not on file   Relationships    Social connections     Talks on phone: Not on file     Gets together: Not on file     Attends Congregational service: Not on file     Active member of club or organization: Not on file     Attends meetings of clubs or organizations: Not on file     Relationship status: Not on file    Intimate partner violence     Fear of current or ex partner: Not on file     Emotionally abused: Not on file     Physically abused: Not on file     Forced sexual activity: Not on file   Other Topics Concern     Service Not Asked    Blood Transfusions Not Asked    Caffeine Concern Not Asked    Occupational Exposure Not Asked    Hobby Hazards Not Asked    Sleep Concern Not Asked    Stress Concern Not Asked    Weight Concern Not Asked    Special Diet Not Asked    Back Care Not Asked    Exercise Not Asked    Bike Helmet Not Asked   [address] Not Asked    Self-Exams Not Asked   Social History Narrative    Not on file     No family history on file. Physical Exam:  Visit Vitals  /74 (BP 1 Location: Left arm, BP Patient Position: Sitting)   Pulse 66   Temp 98.1 °F (36.7 °C) (Oral)   Resp 18   Ht 5' 6.5\" (1.689 m)   Wt 171 lb 12.8 oz (77.9 kg)   SpO2 99%   BMI 27.31 kg/m²     Pain Scale: 1/10       has been . reviewed and is appropriate          Diagnoses and all orders for this visit:    1. Lumbar stenosis with neurogenic claudication  -     gabapentin (Neurontin) 100 mg capsule; 1 tab po bid and 2 tabs q hs  Indications: neuropathic pain    2. Lumbar radiculopathy  -     gabapentin (Neurontin) 100 mg capsule; 1 tab po bid and 2 tabs q hs  Indications: neuropathic pain            Follow-up and Dispositions    · Return in about 6 months (around 1/29/2021) for with NP Catrina.              We have informed Damari Alexander to notify us for immediate appointment if he has any worsening neurogical symptoms or if an emergency situation presents, then call [phone]

## 2020-08-19 NOTE — TELEPHONE ENCOUNTER
PATIENTS WIFE CALLED AND STATED THAT THE SPINAL INJECTION THAT PATIENT HAD ON TUESDAY 9/18 HAS NOT WORKED SO FAR AND IS REQUESTING A REFILL OF THE ULTRAM. PLS CALL PATIENT TO ADVISE. Detail Level: Detailed Quality 110: Preventive Care And Screening: Influenza Immunization: Influenza Immunization Administered during Influenza season

## 2021-01-26 ENCOUNTER — TELEPHONE (OUTPATIENT)
Dept: ORTHOPEDIC SURGERY | Age: 86
End: 2021-01-26

## 2021-01-26 NOTE — TELEPHONE ENCOUNTER
Patient's wife reports patient has been taking Tramadol he just happened to have for his back pain for the past two to three weeks, which seemed to help him. He is taking his last pill today. They are requesting a prescription for this. He does have a follow up appt next week. Please advise.      [place] [id number] [address] [facility] patient

## 2021-01-26 NOTE — TELEPHONE ENCOUNTER
Tramadol is a controlled substance. I would first see if his PCP would be willing to prescribe this as we try to not start patients on controlled pain medications.  If not, he  Can make an apt with an NP here to discuss options

## 2021-01-26 NOTE — TELEPHONE ENCOUNTER
I called and spoke to the pt's wife. The pt was identified using 2 pt identifiers. She was made aware of the provider's response to her recent phone call. She verbalized understanding and will contact his pcp. The pt has an appt scheduled with an NP on 02/03/2021. No questions or concerns voiced at this time.

## 2021-02-03 ENCOUNTER — OFFICE VISIT (OUTPATIENT)
Dept: ORTHOPEDIC SURGERY | Age: 86
End: 2021-02-03
Payer: MEDICARE

## 2021-02-03 VITALS
HEART RATE: 70 BPM | DIASTOLIC BLOOD PRESSURE: 65 MMHG | BODY MASS INDEX: 28.82 KG/M2 | SYSTOLIC BLOOD PRESSURE: 143 MMHG | WEIGHT: 173 LBS | HEIGHT: 65 IN | TEMPERATURE: 97.2 F | OXYGEN SATURATION: 98 % | RESPIRATION RATE: 17 BRPM

## 2021-02-03 DIAGNOSIS — M54.50 LUMBAR PAIN: ICD-10-CM

## 2021-02-03 DIAGNOSIS — M48.062 LUMBAR STENOSIS WITH NEUROGENIC CLAUDICATION: Primary | ICD-10-CM

## 2021-02-03 DIAGNOSIS — M54.16 LUMBAR RADICULOPATHY: ICD-10-CM

## 2021-02-03 PROCEDURE — G8432 DEP SCR NOT DOC, RNG: HCPCS | Performed by: NURSE PRACTITIONER

## 2021-02-03 PROCEDURE — 1101F PT FALLS ASSESS-DOCD LE1/YR: CPT | Performed by: NURSE PRACTITIONER

## 2021-02-03 PROCEDURE — G8419 CALC BMI OUT NRM PARAM NOF/U: HCPCS | Performed by: NURSE PRACTITIONER

## 2021-02-03 PROCEDURE — G8427 DOCREV CUR MEDS BY ELIG CLIN: HCPCS | Performed by: NURSE PRACTITIONER

## 2021-02-03 PROCEDURE — G8536 NO DOC ELDER MAL SCRN: HCPCS | Performed by: NURSE PRACTITIONER

## 2021-02-03 PROCEDURE — 99213 OFFICE O/P EST LOW 20 MIN: CPT | Performed by: NURSE PRACTITIONER

## 2021-02-03 RX ORDER — FUROSEMIDE 20 MG/1
TABLET ORAL DAILY
COMMUNITY

## 2021-02-03 RX ORDER — TRAMADOL HYDROCHLORIDE 50 MG/1
50 TABLET ORAL
Qty: 28 TAB | Refills: 0 | Status: SHIPPED | OUTPATIENT
Start: 2021-02-03 | End: 2021-02-10

## 2021-02-03 RX ORDER — PREGABALIN 75 MG/1
75 CAPSULE ORAL 2 TIMES DAILY
Qty: 60 CAP | Refills: 1 | Status: SHIPPED | OUTPATIENT
Start: 2021-02-03 | End: 2021-03-04 | Stop reason: DRUGHIGH

## 2021-02-03 NOTE — PROGRESS NOTES
Chief complaint   Chief Complaint   Patient presents with    Back Pain       History of Present Illness:  Mary Goodwin is a  80 y.o.  male comes in today stating he has back pain that now radiates to his bilateral buttock and hip down his posterior thigh. It was just on the right. He is taking gabapentin 100 mg in the morning and 200 mg at night but he still feels like it makes him drowsy he is not sure if it is really helping that much he takes 2 Tylenol arthritis a day but continues rating his pain today as a 9 out of 10. He states the left leg is actually worse than the right leg him. He is status post prior laminectomies in 2018 he had a right L3-4 laminectomy and in 2019 he had a left L3-S1 laminectomy. He did have an MRI last year and saw Dr. Hossein Cabral who did not feel further surgery would be beneficial for him. He is diabetic he states his blood sugar is controlled. He continues to work part-time and is a non-smoker. He denies fever bowel bladder dysfunction. Physical Exam: The patient is a 80-year-old male well-developed well-nourished who is alert and oriented with a normal mood and affect. He has a full weightbearing very stiff gait. He did not use any assistive device. He has 4 out of 5 strength bilateral lower extremities. Positive straight leg raise bilaterally. He has pain with hyperextension lumbar spine. Assessment and Plan: This is a patient who has post lumbar laminectomy syndrome along with lumbar spinal stenosis and some facet arthropathy. I am going to discontinue the gabapentin as it gives him side effects. We can try him on Lyrica 75 mg twice a day and I have sent that to his pharmacy. His wife had called the end of January and requested tramadol as that did seem to help with his pain. I am going to give him an acute prescription of tramadol. And can have him follow-up in 1 month.   He understands if he needs to have the tramadol more on a consistent basis we will have to do a drug screen and pain contract. Hopefully the Lyrica will decrease his pain where he would not need it. We will see him back in 1 month sooner if needed.         Review of systems:    Past Medical History:   Diagnosis Date    BPH (benign prostatic hyperplasia)     Diabetes ([address]     ED (erectile dysfunction)     GERD (gastroesophageal reflux disease)     Hypertension     Nausea & vomiting      Past Surgical History:   Procedure Laterality Date    HX BACK SURGERY  10/15/2018    HX CATARACT REMOVAL      HX ORTHOPAEDIC      back surgery    HX SHOULDER REPLACEMENT Left 2016    HX TURP       Social History     Socioeconomic History    Marital status:      Spouse name: Not on file    Number of children: Not on file    Years of education: Not on file    Highest education level: Not on file   Occupational History    Not on file   Social Needs    Financial resource strain: Not on file    Food insecurity     Worry: Not on file     Inability: Not on file    Transportation needs     Medical: Not on file     Non-medical: Not on file   Tobacco Use    Smoking status: Former Smoker     Types: Cigars    Smokeless tobacco: Never Used   Substance and Sexual Activity    Alcohol use: No    Drug use: No    Sexual activity: Not on file   Lifestyle    Physical activity     Days per week: Not on file     Minutes per session: Not on file    Stress: Not on file   Relationships    Social connections     Talks on phone: Not on file     Gets together: Not on file     Attends Taoist service: Not on file     Active member of club or organization: Not on file     Attends meetings of clubs or organizations: Not on file     Relationship status: Not on file    Intimate partner violence     Fear of current or ex partner: Not on file     Emotionally abused: Not on file     Physically abused: Not on file     Forced sexual activity: Not on file   Other Topics Concern   [address] Service Not Asked    Blood Transfusions Not Asked   • Caffeine Concern Not Asked   • Occupational Exposure Not Asked   • Hobby Hazards Not Asked   • Sleep Concern Not Asked   • Stress Concern Not Asked   • Weight Concern Not Asked   • Special Diet Not Asked   • Back Care Not Asked   • Exercise Not Asked   • Bike Helmet Not Asked   • Seat Belt Not Asked   • Self-Exams Not Asked   Social History Narrative   • Not on file     No family history on file.    Physical Exam:  Visit Vitals  BP (!) 143/65 (BP 1 Location: Left upper arm)   Pulse 70   Temp 97.2 °F (36.2 °C)   Resp 17   Ht 5' 5\" (1.651 m)   Wt 173 lb (78.5 kg)   SpO2 98%   BMI 28.79 kg/m²     Pain Scale: 9/10       has been .reviewed and is appropriate          Diagnoses and all orders for this visit:    1. Lumbar stenosis with neurogenic claudication  -     pregabalin (Lyrica) 75 mg capsule; Take 1 Cap by mouth two (2) times a day. Max Daily Amount: 150 mg.  -     traMADoL (Ultram) 50 mg tablet; Take 1 Tab by mouth every six (6) hours as needed for Pain for up to 7 days. Max Daily Amount: 200 mg. Indications: pain    2. Lumbar radiculopathy  -     pregabalin (Lyrica) 75 mg capsule; Take 1 Cap by mouth two (2) times a day. Max Daily Amount: 150 mg.  -     traMADoL (Ultram) 50 mg tablet; Take 1 Tab by mouth every six (6) hours as needed for Pain for up to 7 days. Max Daily Amount: 200 mg. Indications: pain    3. Lumbar pain  -     pregabalin (Lyrica) 75 mg capsule; Take 1 Cap by mouth two (2) times a day. Max Daily Amount: 150 mg.  -     traMADoL (Ultram) 50 mg tablet; Take 1 Tab by mouth every six (6) hours as needed for Pain for up to 7 days. Max Daily Amount: 200 mg. Indications: pain            Follow-up and Dispositions    · Return in about 4 weeks (around 3/3/2021) for with NP Catrina.             We have informed Tahir ROWLAND Jama to notify us for immediate appointment if he has any worsening neurogical symptoms or if an emergency situation presents, then call [phone]

## 2021-02-15 RX ORDER — DICLOFENAC SODIUM 10 MG/G
GEL TOPICAL
Qty: 300 G | Refills: 3 | Status: SHIPPED | OUTPATIENT
Start: 2021-02-15 | End: 2021-12-30

## 2021-03-04 ENCOUNTER — OFFICE VISIT (OUTPATIENT)
Dept: ORTHOPEDIC SURGERY | Age: 86
End: 2021-03-04
Payer: MEDICARE

## 2021-03-04 VITALS
DIASTOLIC BLOOD PRESSURE: 77 MMHG | HEART RATE: 66 BPM | HEIGHT: 64 IN | TEMPERATURE: 98.6 F | OXYGEN SATURATION: 99 % | SYSTOLIC BLOOD PRESSURE: 161 MMHG | WEIGHT: 173 LBS | RESPIRATION RATE: 17 BRPM | BODY MASS INDEX: 29.53 KG/M2

## 2021-03-04 DIAGNOSIS — G03.9 ARACHNOIDITIS: Primary | ICD-10-CM

## 2021-03-04 DIAGNOSIS — M48.062 LUMBAR STENOSIS WITH NEUROGENIC CLAUDICATION: ICD-10-CM

## 2021-03-04 DIAGNOSIS — M54.16 LUMBAR RADICULOPATHY: ICD-10-CM

## 2021-03-04 DIAGNOSIS — M54.50 LUMBAR PAIN: ICD-10-CM

## 2021-03-04 DIAGNOSIS — G03.9 ARACHNOIDITIS: ICD-10-CM

## 2021-03-04 DIAGNOSIS — Z79.899 LONG TERM CURRENT USE OF THERAPEUTIC DRUG: ICD-10-CM

## 2021-03-04 PROCEDURE — G8432 DEP SCR NOT DOC, RNG: HCPCS | Performed by: NURSE PRACTITIONER

## 2021-03-04 PROCEDURE — G8427 DOCREV CUR MEDS BY ELIG CLIN: HCPCS | Performed by: NURSE PRACTITIONER

## 2021-03-04 PROCEDURE — 1101F PT FALLS ASSESS-DOCD LE1/YR: CPT | Performed by: NURSE PRACTITIONER

## 2021-03-04 PROCEDURE — G8419 CALC BMI OUT NRM PARAM NOF/U: HCPCS | Performed by: NURSE PRACTITIONER

## 2021-03-04 PROCEDURE — 99213 OFFICE O/P EST LOW 20 MIN: CPT | Performed by: NURSE PRACTITIONER

## 2021-03-04 PROCEDURE — G8536 NO DOC ELDER MAL SCRN: HCPCS | Performed by: NURSE PRACTITIONER

## 2021-03-04 RX ORDER — TRAMADOL HYDROCHLORIDE 50 MG/1
50 TABLET ORAL
Qty: 60 TAB | Refills: 0 | Status: SHIPPED | OUTPATIENT
Start: 2021-03-04 | End: 2021-04-06

## 2021-03-04 RX ORDER — PREGABALIN 150 MG/1
150 CAPSULE ORAL 2 TIMES DAILY
Qty: 60 CAP | Refills: 2 | Status: SHIPPED | OUTPATIENT
Start: 2021-03-04

## 2021-03-04 NOTE — PROGRESS NOTES
Follow-up and Dispositions    · Return in about 3 months (around 6/4/2021) for with FREDI Fritz. Chief complaint   Chief Complaint   Patient presents with    Back Pain       History of Present Illness:  James Nick is a  80 y.o.  male comes in today for follow-up of his back pain with bilateral buttock pain that radiates to his posterior thighs laterally. He has had 3 surgeries. The first was a lumbar surgery by Dr. Ronald Terrazas in 2005. He then had a right L3-4 laminectomy by Dr. Talha Stewart in 2018 and then finally a left L3-S1 laminectomy in 2019 by Dr. Talha Stewrat. 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 Talha Stewart did not feel like he would benefit from further surgery. We started him on Lyrica 75 mg twice a day and he states that that does help his bilateral leg pain. He still has some pain but it is better than it was. He has tried gabapentin in the past and that caused him to feel drunk. We also gave him some tramadol to take for his pain and he states that did help tremendously and it brought his pain from a 9 down to a 4. This enables him to continue working part-time. He is a non-smoker. He denies fever bowel bladder dysfunction. Physical Exam: Patient is a 29-year-old male well-developed well-nourished who is alert and oriented with a normal mood and affect. He has a full weightbearing nonantalgic gait. He did not use any assistive device. He has 4 out of 5 strength bilateral lower extremities. Negative straight leg raise. He has pain with hyperextension lumbar spine. Assessment and Plan: This is a patient who has arachnoiditis, lumbar stenosis with back pain and radicular pain. I am going to increase his Lyrica to 150 twice a day. We will get a UDS today and a pain agreement. I will give him tramadol to take twice a day as needed for pain. We will see him back in 3 months sooner if needed.         Review of systems:    Past Medical History:   Diagnosis Date  BPH (benign prostatic hyperplasia)     [address] [address]     ED (erectile dysfunction)     GERD (gastroesophageal reflux disease)     Hypertension     Nausea & vomiting      Past Surgical History:   Procedure Laterality Date    HX BACK SURGERY  10/15/2018    HX CATARACT REMOVAL      HX ORTHOPAEDIC      back surgery    HX SHOULDER REPLACEMENT Left 2016    HX TURP       Social History     Socioeconomic History    Marital status:      Spouse name: Not on file    Number of children: Not on file    Years of education: Not on file    Highest education level: Not on file   Occupational History    Not on file   Social Needs    Financial resource strain: Not on file    Food insecurity     Worry: Not on file     Inability: Not on file    Transportation needs     Medical: Not on file     Non-medical: Not on file   Tobacco Use    Smoking status: Former Smoker     Types: Cigars    Smokeless tobacco: Never Used   Substance and Sexual Activity    Alcohol use: No    Drug use: No    Sexual activity: Not on file   Lifestyle    Physical activity     Days per week: Not on file     Minutes per session: Not on file    Stress: Not on file   Relationships    Social connections     Talks on phone: Not on file     Gets together: Not on file     Attends [employer] service: Not on file     Active member of club or organization: Not on file     Attends meetings of clubs or organizations: Not on file     Relationship status: Not on file    Intimate partner violence     Fear of current or ex partner: Not on file     Emotionally abused: Not on file     Physically abused: Not on file     Forced sexual activity: Not on file   Other Topics Concern     Service Not Asked    Blood Transfusions Not Asked    Caffeine Concern Not Asked    Occupational Exposure Not Asked   Gloria Ambrocio Hazards Not Asked    Sleep Concern Not Asked    Stress Concern Not Asked    Weight Concern Not Asked    Special Diet Not Asked  Back Care Not Asked    Exercise Not Asked    Bike Helmet Not Asked   [address] Not Asked    Self-Exams Not Asked   Social History Narrative    Not on file     No family history on file. Physical Exam:  Visit Vitals  BP (!) 161/77 (BP 1 Location: Left upper arm)   Pulse 66   Temp 98.6 °F (37 °C)   Resp 17   Ht 5' 4\" (1.626 m)   Wt 173 lb (78.5 kg)   SpO2 99%   BMI 29.70 kg/m²     Pain Scale: 6/10       has been . reviewed and is appropriate          Diagnoses and all orders for this visit:    1. Arachnoiditis  -     DRUG SCREEN UR - W/ CONFIRM; Future  -     traMADoL (Ultram) 50 mg tablet; Take 1 Tab by mouth every six (6) hours as needed for Pain for up to 30 days. Max Daily Amount: 200 mg. Indications: chronic pain    2. Lumbar stenosis with neurogenic claudication  -     pregabalin (Lyrica) 150 mg capsule; Take 1 Cap by mouth two (2) times a day. Max Daily Amount: 300 mg.  -     DRUG SCREEN UR - W/ CONFIRM; Future  -     traMADoL (Ultram) 50 mg tablet; Take 1 Tab by mouth every six (6) hours as needed for Pain for up to 30 days. Max Daily Amount: 200 mg. Indications: chronic pain    3. Lumbar radiculopathy  -     pregabalin (Lyrica) 150 mg capsule; Take 1 Cap by mouth two (2) times a day. Max Daily Amount: 300 mg.  -     DRUG SCREEN UR - W/ CONFIRM; Future  -     traMADoL (Ultram) 50 mg tablet; Take 1 Tab by mouth every six (6) hours as needed for Pain for up to 30 days. Max Daily Amount: 200 mg. Indications: chronic pain    4. Lumbar pain  -     pregabalin (Lyrica) 150 mg capsule; Take 1 Cap by mouth two (2) times a day. Max Daily Amount: 300 mg.  -     DRUG SCREEN UR - W/ CONFIRM; Future  -     traMADoL (Ultram) 50 mg tablet; Take 1 Tab by mouth every six (6) hours as needed for Pain for up to 30 days. Max Daily Amount: 200 mg. Indications: chronic pain    5. Long term current use of therapeutic drug  -     DRUG SCREEN UR - W/ CONFIRM;  Future            Follow-up and Dispositions    · Return in about 3 months (around 6/4/2021) for with FREDI Fritz.              We have informed Francis Youssef to notify us for immediate appointment if he has any worsening neurogical symptoms or if an emergency situation presents, then call [phone]

## 2021-05-07 DIAGNOSIS — M54.50 LUMBAR PAIN: ICD-10-CM

## 2021-05-07 DIAGNOSIS — M54.16 LUMBAR RADICULOPATHY: ICD-10-CM

## 2021-05-07 DIAGNOSIS — M48.062 LUMBAR STENOSIS WITH NEUROGENIC CLAUDICATION: ICD-10-CM

## 2021-05-07 DIAGNOSIS — G03.9 ARACHNOIDITIS: ICD-10-CM

## 2021-05-07 RX ORDER — TRAMADOL HYDROCHLORIDE 50 MG/1
TABLET ORAL
Qty: 60 TAB | Refills: 0 | Status: SHIPPED | OUTPATIENT
Start: 2021-05-07 | End: 2021-06-06

## 2021-05-19 NOTE — OP NOTES
[facility] REPORT    Paulo Garcia  MR#: [id number]  : 1932  ACCOUNT #: [de-identified]   DATE OF SERVICE: 10/15/2018    PREOPERATIVE DIAGNOSIS:  Herniated nucleus pulposus L3-4 on right, spinal stenosis. POSTOPERATIVE DIAGNOSIS:  Herniated nucleus pulposus L3-4 on right, spinal stenosis. PROCEDURE PERFORMED:  Right L3-L4 hemilaminectomy, medial facetectomy,   diskectomy. SURGEON:  Aurea Sena MD    ASSISTANT:  0             FINDINGS:  The patient had spinal stenosis due to facet ligamentous hypertrophy as well as an extruded sequestered disk herniation on the right. OPERATION:  Following induction of endotracheal anesthesia, the patient was turned in prone position on a spinal frame. The patient was prepped and draped in usual fashion. Midline incision was made. A paramedian incision in the ligamentous fascia of the  section of L3-4 on the right. C-arm image verified our surgical level. Hemilaminotomy was done with medial facetectomy on the right. Interval ligamentum flavum was resected and the traversing root of L4 mobilized as it was decompressed. The underlying discal mass was palpated. It was firm, had to incise it to mobilize it away from the neighboring pedicle and debrided which gave further mobilization of the traversing root. The discal mass was removed. Accompanying disk material which freed up the neural elements. I could palpate out the foramina along the 3 root and down along the 4 root along the pedicle and out along it at the conclusion. The segment remained stable. There was no apparent bleeding. Gelfoam and thrombin placed over the laminotomy site. Vancomycin powder instilled for infection prophylaxis. There is no active bleeding. The lumbodorsal fascia was closed with #1 Vicryl, subcutaneous tissues closed with 2-0 Vicryl, skin closed with 4-0 Monocryl subcuticular suture and Dermabond.   A sterile occlusive dressing was placed upon the wound. All counts were correct. ESTIMATED BLOOD LOSS:  5-10 mL. SPECIMENS REMOVED:  None.     IMPLANTS:  No.    COMPLICATIONS:  No.    ANESTHESIA:  General endotracheal.      Shabana Oreilly MD       [address]  D: 10/15/2018 12:48     T: 10/15/2018 15:51  JOB #: [id number] What Is The Patient's Gender: Male

## 2021-06-17 ENCOUNTER — OFFICE VISIT (OUTPATIENT)
Dept: ORTHOPEDIC SURGERY | Age: 86
End: 2021-06-17
Payer: MEDICARE

## 2021-06-17 VITALS
RESPIRATION RATE: 14 BRPM | HEART RATE: 70 BPM | DIASTOLIC BLOOD PRESSURE: 70 MMHG | BODY MASS INDEX: 28.34 KG/M2 | TEMPERATURE: 97.5 F | WEIGHT: 166 LBS | SYSTOLIC BLOOD PRESSURE: 138 MMHG | HEIGHT: 64 IN | OXYGEN SATURATION: 97 %

## 2021-06-17 DIAGNOSIS — M54.50 LUMBAR PAIN: ICD-10-CM

## 2021-06-17 DIAGNOSIS — G03.9 ARACHNOIDITIS: Primary | ICD-10-CM

## 2021-06-17 DIAGNOSIS — M54.16 LUMBAR RADICULOPATHY: ICD-10-CM

## 2021-06-17 DIAGNOSIS — M48.062 LUMBAR STENOSIS WITH NEUROGENIC CLAUDICATION: ICD-10-CM

## 2021-06-17 PROCEDURE — G8432 DEP SCR NOT DOC, RNG: HCPCS | Performed by: NURSE PRACTITIONER

## 2021-06-17 PROCEDURE — 99213 OFFICE O/P EST LOW 20 MIN: CPT | Performed by: NURSE PRACTITIONER

## 2021-06-17 PROCEDURE — G8419 CALC BMI OUT NRM PARAM NOF/U: HCPCS | Performed by: NURSE PRACTITIONER

## 2021-06-17 PROCEDURE — G8536 NO DOC ELDER MAL SCRN: HCPCS | Performed by: NURSE PRACTITIONER

## 2021-06-17 PROCEDURE — G8427 DOCREV CUR MEDS BY ELIG CLIN: HCPCS | Performed by: NURSE PRACTITIONER

## 2021-06-17 PROCEDURE — 1101F PT FALLS ASSESS-DOCD LE1/YR: CPT | Performed by: NURSE PRACTITIONER

## 2021-06-17 RX ORDER — TRAMADOL HYDROCHLORIDE 50 MG/1
50 TABLET ORAL
Qty: 60 TABLET | Refills: 2 | Status: SHIPPED | OUTPATIENT
Start: 2021-06-17 | End: 2021-07-17

## 2021-06-17 RX ORDER — PREGABALIN 75 MG/1
75 CAPSULE ORAL 2 TIMES DAILY
Qty: 60 CAPSULE | Refills: 2 | Status: SHIPPED | OUTPATIENT
Start: 2021-06-17

## 2021-06-17 RX ORDER — MELOXICAM 15 MG/1
15 TABLET ORAL DAILY
Qty: 30 TABLET | Refills: 2 | Status: SHIPPED | OUTPATIENT
Start: 2021-06-17 | End: 2021-09-14

## 2021-06-17 NOTE — PROGRESS NOTES
Chief complaint   Chief Complaint   Patient presents with    Back Pain       History of Present Illness:  Karyle Gates is a  80 y.o.  male who comes in today for follow-up his chronic low back pain with bilateral buttock pain. His pain was radiating down to his posterior thighs but now on the left is radiating down to his foot. He states he could not tolerate increased dose of Lyrica 150 mg twice a day from 75 mg twice a day. He states it was giving him horrible nightmares and technic color so he has been off of it for about a month. He states since he has been off the Lyrica he has had increased leg pain. He also has back pain and that is controlled with tramadol 50 mg twice a day. It brings his pain from a 10 down to a 5. He has had multiple surgeries with the first 1 being in by Dr. Jane Lamar in 2005. That was followed by a right L3-4 laminectomy by Dr. Omari Aguilera in 2018 and then finally a left L3-S1 laminectomy in 2019 by Dr. Omari Aguilera. 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 He denies fever bowel bladder dysfunction. He denies any new medical issues. Physical Exam: The patient is a 27-year-old male well-developed well-nourished who is alert and oriented with a normal mood and affect. He has a very stiff gait. He is able to get from sitting to standing on his own but it is slowly. Once he gets moving his gait is a little more smoother. He has 4 out of 5 strength bilateral lower extremities. He does not have any pain with hyperextension lumbar spine. Assessment and Plan: With arachnoiditis, lumbar stenosis and radicular pain. I am going to restart him on Lyrica at 75 mg twice a day. He was able to tolerate that dose and we will see if that helps his leg pain at least somewhat. I have also refilled his tramadol. We will give him a prescription of Mobic 15 mg daily. He will only take it as needed. I did explain the black's box warning for NSAIDs to both the patient and his wife. They verbalized understanding. He knows to take it with food. We will see him back in 3-month sooner if needed. Medications  Current Outpatient Medications   Medication Sig Dispense Refill    pregabalin (Lyrica) 75 mg capsule Take 1 Capsule by mouth two (2) times a day. Max Daily Amount: 150 mg. 60 Capsule 2    traMADoL (Ultram) 50 mg tablet Take 1 Tablet by mouth two (2) times daily as needed for Pain for up to 30 days. Max Daily Amount: 100 mg. Indications: chronic pain 60 Tablet 2    meloxicam (Mobic) 15 mg tablet Take 1 Tablet by mouth daily. 30 Tablet 2    pregabalin (Lyrica) 150 mg capsule Take 1 Cap by mouth two (2) times a day. Max Daily Amount: 300 mg. 60 Cap 2    diclofenac (VOLTAREN) 1 % gel APPLY 1 GRAM TO AFFECTED AREA THREE TIMES DAILY 300 g 3    furosemide (LASIX) 20 mg tablet Take  by mouth daily.  omeprazole (PRILOSEC) 20 mg capsule TAKE 1 CAP BY MOUTH ONCE A DAY   TAKE 30 MINS PRIOR TO A MEAL      dutasteride (AVODART) 0.5 mg capsule Take 1 Cap by mouth daily. (Patient taking differently: Take 0.5 mg by mouth daily. To take on Monday evenings only-per patient) 90 Cap 3    saw palmetto 500 mg cap Take 450 mg by mouth. Indications: 2 at noon and 2 at dinner      lisinopril (PRINIVIL, ZESTRIL) 40 mg tablet Take 40 mg by mouth daily.  amLODIPine (NORVASC) 5 mg tablet Take 5 mg by mouth daily.  glipiZIDE SR (GLUCOTROL) 2.5 mg CR tablet Take  by mouth two (2) times a day.  metFORMIN (GLUCOPHAGE) 500 mg tablet Take  by mouth two (2) times daily (with meals).  esomeprazole (NEXIUM) 20 mg capsule Take  by mouth two (2) times a day.  aspirin delayed-release 81 mg tablet Take  by mouth nightly.  multivitamin with iron (DAILY MULTI-VITAMINS/IRON) tablet Take 1 tablet by mouth daily.  VITAMIN E, DL,TOCOPHERYL ACET, (VITAMIN E ACETATE) 400 unit cap capsule Take  by mouth daily.  Takes at noon      cinnamon bark (CINNAMON) 500 mg cap Take  by mouth two (2) times a day. Takes one tablet at noon and one tablet at dinner           Review of systems:    Past Medical History:   Diagnosis Date    BPH (benign prostatic hyperplasia)     Diabetes ([address]     ED (erectile dysfunction)     GERD (gastroesophageal reflux disease)     Hypertension     Nausea & vomiting      Past Surgical History:   Procedure Laterality Date    HX BACK SURGERY  10/15/2018    HX CATARACT REMOVAL      HX ORTHOPAEDIC      back surgery    HX SHOULDER REPLACEMENT Left 2016    HX TURP       Social History     Socioeconomic History    Marital status:      Spouse name: Not on file    Number of children: Not on file    Years of education: Not on file    Highest education level: Not on file   Occupational History    Not on file   Tobacco Use    Smoking status: Former Smoker     Types: Cigars    Smokeless tobacco: Never Used   Substance and Sexual Activity    Alcohol use: No    Drug use: No    Sexual activity: Not on file   Other Topics Concern     Service Not Asked    Blood Transfusions Not Asked    Caffeine Concern Not Asked    Occupational Exposure Not Asked    Hobby Hazards Not Asked    Sleep Concern Not Asked    Stress Concern Not Asked    Weight Concern Not Asked    Special Diet Not Asked    Back Care Not Asked    Exercise Not Asked    Bike Helmet Not Asked    Seat Belt Not Asked    Self-Exams Not Asked   Social History Narrative    Not on file     Social Determinants of Health     Financial Resource Strain:     Difficulty of Paying Living Expenses:    Food Insecurity:     Worried About Running Out of Food in the Last Year:     Ran Out of Food in the Last Year:    Transportation Needs:     Lack of Transportation (Medical):      Lack of Transportation (Non-Medical):    Physical Activity:     Days of Exercise per Week:     Minutes of Exercise per Session:    Stress:     Feeling of Stress :    Social Connections:     Frequency of Communication with Friends and Family:     Frequency of Social Gatherings with Friends and Family:     Attends Restoration[employer] Services:     Active Member of Clubs or Organizations:     Attends Club or Organization Meetings:     Marital Status:    Intimate Partner Violence:     Fear of Current or Ex-Partner:     Emotionally Abused:     Physically Abused:     Sexually Abused:      History reviewed. No pertinent family history. Physical Exam:  Visit Vitals  /70 (BP 1 Location: Left upper arm, BP Patient Position: Sitting)   Pulse 70   Temp 97.5 °F (36.4 °C) (Temporal)   Resp 14   Ht 5' 4\" (1.626 m)   Wt 166 lb (75.3 kg)   SpO2 97%   BMI 28.49 kg/m²     Pain Scale: 9/10    Least pain over the last week has been 5/10. Worst pain over the last week has been 10/10. Opioid Risk Tool Reviewed: YES, Score 0  Aberrant behaviors: None. Urine Drug Screen: 3/4/21 consistent for time of specimen. Controlled substance agreement on file: yes.  reviewed:yes  Pill count is consistent with his prescription: n/a  Concomitant use of a benzodiazepine: no  MME 10  Narcan not warranted        Risks and benefits of ongoing opiate therapy have been reviewed with the patient. No pain behaviors. Denies thoughts of harming self or others. Pt has a good risk to benefit ratio which allows the pt to function in a home environment without side effects        has been . reviewed and is appropriate    Pt has a consistent UDS in the record      Diagnoses and all orders for this visit:    1. Arachnoiditis  -     pregabalin (Lyrica) 75 mg capsule; Take 1 Capsule by mouth two (2) times a day. Max Daily Amount: 150 mg.  -     traMADoL (Ultram) 50 mg tablet; Take 1 Tablet by mouth two (2) times daily as needed for Pain for up to 30 days. Max Daily Amount: 100 mg. Indications: chronic pain    2. Lumbar stenosis with neurogenic claudication  -     pregabalin (Lyrica) 75 mg capsule; Take 1 Capsule by mouth two (2) times a day.  Max Daily Amount: 150 mg.  -     traMADoL (Ultram) 50 mg tablet; Take 1 Tablet by mouth two (2) times daily as needed for Pain for up to 30 days. Max Daily Amount: 100 mg. Indications: chronic pain  -     meloxicam (Mobic) 15 mg tablet; Take 1 Tablet by mouth daily. 3. Lumbar radiculopathy  -     pregabalin (Lyrica) 75 mg capsule; Take 1 Capsule by mouth two (2) times a day. Max Daily Amount: 150 mg.  -     traMADoL (Ultram) 50 mg tablet; Take 1 Tablet by mouth two (2) times daily as needed for Pain for up to 30 days. Max Daily Amount: 100 mg. Indications: chronic pain    4. Lumbar pain  -     pregabalin (Lyrica) 75 mg capsule; Take 1 Capsule by mouth two (2) times a day. Max Daily Amount: 150 mg.  -     traMADoL (Ultram) 50 mg tablet; Take 1 Tablet by mouth two (2) times daily as needed for Pain for up to 30 days. Max Daily Amount: 100 mg. Indications: chronic pain  -     meloxicam (Mobic) 15 mg tablet; Take 1 Tablet by mouth daily. Follow-up and Dispositions    · Return in about 3 months (around 9/17/2021) for with FREDI Fritz.              We have informed Tito Pearson to notify us for immediate appointment if he has any worsening neurogical symptoms or if an emergency situation presents, then call [phone]

## 2021-09-14 DIAGNOSIS — M54.50 LUMBAR PAIN: ICD-10-CM

## 2021-09-14 DIAGNOSIS — M48.062 LUMBAR STENOSIS WITH NEUROGENIC CLAUDICATION: ICD-10-CM

## 2021-09-14 RX ORDER — MELOXICAM 15 MG/1
TABLET ORAL
Qty: 30 TABLET | Refills: 2 | Status: SHIPPED | OUTPATIENT
Start: 2021-09-14 | End: 2022-08-10

## 2021-12-30 RX ORDER — DICLOFENAC SODIUM 10 MG/G
GEL TOPICAL
Qty: 300 G | Refills: 3 | Status: SHIPPED | OUTPATIENT
Start: 2021-12-30

## 2022-03-10 NOTE — PROGRESS NOTES
Depression Screening:  3 most recent PHQ Screens 6/17/2021   PHQ Not Done -   Little interest or pleasure in doing things Not at all   Feeling down, depressed, irritable, or hopeless Not at all   Total Score PHQ 2 0       Learning Assessment:  No flowsheet data found. Abuse Screening:  No flowsheet data found. Fall Risk  Fall Risk Assessment, last 12 mths 3/4/2021   Able to walk? Yes   Fall in past 12 months? 0   Do you feel unsteady? 0   Are you worried about falling 0   Number of falls in past 12 months -   Fall with injury? -       ADL  No flowsheet data found. Travel Screening:    Travel Screening      No screening recorded since 06/16/21 0000      Travel History   Travel since 05/17/21     No documented travel since 05/17/21          Health Maintenance reviewed and discussed and ordered per Provider. Health Maintenance Due   Topic Date Due    COVID-19 Vaccine (1) Never done    DTaP/Tdap/Td series (1 - Tdap) Never done    Shingrix Vaccine Age 50> (1 of 2) Never done    Medicare Yearly Exam  Never done   . Madelyn Delaney presents today for   Chief Complaint   Patient presents with    Back Pain       Is someone accompanying this pt? Yes, Craige Bowels spouse    Is the patient using any DME equipment during OV? no    Coordination of Care:  1. Have you been to the ER, urgent care clinic since your last visit? Hospitalized since your last visit? no    2. Have you seen or consulted any other health care providers outside of the [address] [place] [address] since your last visit? Include any pap smears or colon screening.  no [Follow-Up] : a follow-up visit [COPD] : COPD [Pulmonary Nodules] : pulmonary nodules

## 2022-03-18 PROBLEM — M51.36 DDD (DEGENERATIVE DISC DISEASE), LUMBAR: Status: ACTIVE | Noted: 2018-09-12

## 2022-03-18 PROBLEM — M48.061 LUMBAR STENOSIS WITHOUT NEUROGENIC CLAUDICATION: Status: ACTIVE | Noted: 2018-09-12

## 2022-03-19 PROBLEM — M48.00 SPINAL STENOSIS: Status: ACTIVE | Noted: 2019-04-01

## 2022-03-19 PROBLEM — M54.40 CHRONIC RIGHT-SIDED LOW BACK PAIN WITH SCIATICA: Status: ACTIVE | Noted: 2018-08-29

## 2022-03-19 PROBLEM — G89.29 CHRONIC RIGHT-SIDED LOW BACK PAIN WITH SCIATICA: Status: ACTIVE | Noted: 2018-08-29

## 2022-03-19 PROBLEM — M47.817 LUMBOSACRAL SPONDYLOSIS WITHOUT MYELOPATHY: Status: ACTIVE | Noted: 2018-09-12

## 2022-03-19 PROBLEM — Z51.81 THERAPEUTIC DRUG MONITORING: Status: ACTIVE | Noted: 2018-09-06

## 2022-03-19 PROBLEM — R29.898 RIGHT LEG WEAKNESS: Status: ACTIVE | Noted: 2018-08-29

## 2022-03-19 PROBLEM — M96.1 LUMBAR POST-LAMINECTOMY SYNDROME: Status: ACTIVE | Noted: 2018-09-12

## 2022-03-19 PROBLEM — M51.26 LUMBAR DISC HERNIATION: Status: ACTIVE | Noted: 2018-10-15

## 2022-03-19 PROBLEM — M54.16 LUMBAR NEURITIS: Status: ACTIVE | Noted: 2018-09-12

## 2022-03-20 PROBLEM — Z79.891 LONG TERM CURRENT USE OF OPIATE ANALGESIC: Status: ACTIVE | Noted: 2018-09-06

## 2022-08-10 DIAGNOSIS — M54.50 LUMBAR PAIN: ICD-10-CM

## 2022-08-10 DIAGNOSIS — M48.062 LUMBAR STENOSIS WITH NEUROGENIC CLAUDICATION: ICD-10-CM

## 2022-08-10 RX ORDER — MELOXICAM 15 MG/1
TABLET ORAL
Qty: 30 TABLET | Refills: 2 | Status: SHIPPED | OUTPATIENT
Start: 2022-08-10

## 2023-05-19 RX ORDER — QUETIAPINE FUMARATE 50 MG/1
TABLET, FILM COATED ORAL
COMMUNITY
Start: 2023-03-11

## 2023-05-19 RX ORDER — PIOGLITAZONEHYDROCHLORIDE 15 MG/1
TABLET ORAL
COMMUNITY
Start: 2023-03-09

## 2023-05-19 RX ORDER — OXYBUTYNIN CHLORIDE 5 MG/1
5 TABLET, EXTENDED RELEASE ORAL DAILY
COMMUNITY
Start: 2023-04-25

## 2023-05-19 RX ORDER — LISINOPRIL 20 MG/1
20 TABLET ORAL DAILY
COMMUNITY
Start: 2023-04-24

## 2023-05-19 RX ORDER — BETAMETHASONE DIPROPIONATE 0.5 MG/G
CREAM TOPICAL
COMMUNITY
Start: 2023-04-05

## 2023-05-19 RX ORDER — CALCIUM CITRATE/VITAMIN D3 200MG-6.25
TABLET ORAL
COMMUNITY
Start: 2023-03-19

## 2023-05-19 RX ORDER — GABAPENTIN 100 MG/1
100 CAPSULE ORAL 3 TIMES DAILY
COMMUNITY
Start: 2023-03-14

## 2024-09-27 NOTE — PROGRESS NOTES
Please hold your lisinopril as discussed today.   Problem: Self Care Deficits Care Plan (Adult) Goal: *Acute Goals and Plan of Care (Insert Text) Outcome: Resolved/Met Date Met: 10/16/18 Occupational Therapy EVALUATION/discharge Patient: Ivy Cramer (48 y.o. male) Date: 10/16/2018 Primary Diagnosis: HNP (herniated nucleus pulposus), lumbar [M51.26] Procedure(s) (LRB): 
RIGHT L3/4 LAMINECTOMY DISCECTOMY/C-ARM (Right) 1 Day Post-Op Precautions:   Spinal, Fall ASSESSMENT AND RECOMMENDATIONS: 
Based on the objective data described below, the patient presents decreased activity tolerance, safety awareness, and pain limiting independence in functional activity. Pt laying supine in bed with spouse and son present. Pt stated he had 5/10 pain in low back. Pt assisted to perform supine>sit at edge of bed with min A and v/c for maintaining back precautions. Once sitting edge of bed, pt became anxious and demonstrated impatience and decreased attention. Pt and wife educated on the benefits of training with AE for pt independence with self care. Pt and wife stated wife will be assisting with dressing when home and neither would like the AE for home use. Pt performed LB dressing with mod A from wife and ed on threading underwear and pants before standing to ginna over hips. Pt ed on pushing from bed rather than pulling from RW. Pt performed standing with intact balance and support from RW. Pt ed on use of LH sponge when seated in shower chair, however, pt suddenly stood up and ambulated to bathroom where he grasped wall and brought one leg to hand attempting to demonstrate washing feet in stance in shower. Pt ed on dangers of standing in shower as well as bending at waist when reaching feet. Pt assisted to arm chair with call bell beside him and nurse informed. Skilled occupational therapy is not indicated at this time. Discharge Recommendations: None Further Equipment Recommendations for Discharge: N/A   
 
 Barriers to Learning/Limitations: yes;  cognitive Compensate with: visual, verbal, tactile, kinesthetic cues/model COMPLEXITY Eval Complexity: History: LOW Complexity : Brief history review ; Examination: LOW Complexity : 1-3 performance deficits relating to physical, cognitive , or psychosocial skils that result in activity limitations and / or participation restrictions ; Decision Making:MEDIUM Complexity : Patient may present with comorbidities that affect occupational performnce. Miniml to moderate modification of tasks or assistance (eg, physical or verbal ) with assesment(s) is necessary to enable patient to complete evaluation  Assessment: low Complexity G-CODES:  
 
Self Care  Current  CK= 40-59%  Goal  CK= 40-59%  D/C  CK= 40-59%. The severity rating is based on the Level of Assistance required for Functional Mobility and ADLs. SUBJECTIVE:  
Patient stated Do I really have to do this? Patient educated on freedom to refuse OT eval as well as benefits of training with use of AE OBJECTIVE DATA SUMMARY:  
 
Past Medical History:  
Diagnosis Date  BPH (benign prostatic hyperplasia)  Diabetes ([address]  ED (erectile dysfunction)  Hypertension  Nausea & vomiting Past Surgical History:  
Procedure Laterality Date  HX CATARACT REMOVAL    
 HX ORTHOPAEDIC    
 back surgery  HX SHOULDER REPLACEMENT Left 2016  HX TURP Prior Level of Function/Home Situation: Pt was requiring assist from wife for dressing and ambulated without AD at home. Home Situation Home Environment: Private residence # Steps to Enter: 3 Rails to Enter: Yes Hand Rails : Bilateral 
One/Two Story Residence: One story Living Alone: No 
Support Systems: Child(jose), Spouse/Significant Other/Partner Patient Expects to be Discharged to[de-identified] Private residence Current DME Used/Available at Home: Shower chair, Walker, rolling, Wheelchair, Commode, bedside Tub or Shower Type: Shower 
[]     Right hand dominant   []     Left hand dominantCognitive/Behavioral Status: 
Neurologic State: Alert Orientation Level: Oriented to person;Oriented to place;Oriented to situation Cognition: Impulsive Safety/Judgement: Fall prevention;Home safety Skin: intact BUEs Edema: none noted BUEs Coordination: 
Coordination: Within functional limits (BUEs) Fine Motor Skills-Upper: Left Intact; Right Intact Gross Motor Skills-Upper: Left Intact; Right Intact Balance: 
Sitting: Intact; With support Strength: 
Strength: Within functional limits (BUEs) Tone & Sensation: 
Tone: Normal (BUEs) Sensation: Intact (BUEs) Range of Motion: 
AROM: Within functional limits (BUEs) Functional Mobility and Transfers for ADLs: 
Bed Mobility: 
Rolling: Minimum assistance Supine to Sit: Minimum assistance Transfers: 
Sit to Stand: Contact guard assistance ADL Assessment: 
Feeding: Independent Oral Facial Hygiene/Grooming: Independent Bathing: Minimum assistance Upper Body Dressing: Minimum assistance Lower Body Dressing: Moderate assistance Toileting: Contact guard assistance ADL Intervention: 
 Cognitive Retraining Safety/Judgement: Fall prevention;Home safety Pain: 
Pt reports 5/10 pain or discomfort prior to treatment.   
Pt reports 5/10 pain or discomfort post treatment. Activity Tolerance:  
Fair Please refer to the flowsheet for vital signs taken during this treatment. After treatment:  
[x]  Patient left in no apparent distress sitting up in chair 
[]  Patient left in no apparent distress in bed 
[x]  Call bell left within reach [x]  Nursing notified 
[]  Caregiver present 
[]  Bed alarm activated COMMUNICATION/EDUCATION:  
Communication/Collaboration: 
[x]      Home safety education was provided and the patient/caregiver indicated understanding. [x]      Patient/family have participated as able and agree with findings and recommendations. []      Patient is unable to participate in plan of care at this time. Cari Wyman, OT Time Calculation: 21 mins

## (undated) DEVICE — SUTURE MCRYL SZ 3-0 L27IN ABSRB UD L24MM PS-1 3/8 CIR PRIM Y936H

## (undated) DEVICE — INTENDED FOR TISSUE SEPARATION, AND OTHER PROCEDURES THAT REQUIRE A SHARP SURGICAL BLADE TO PUNCTURE OR CUT.: Brand: BARD-PARKER SAFETY BLADES SIZE 15, STERILE

## (undated) DEVICE — SUTURE VCRL SZ 2-0 L18IN ABSRB VLT CT-1 L36MM 1/2 CIR J739D

## (undated) DEVICE — NEEDLE HYPO 22GA L1.5IN BLK S STL HUB POLYPR SHLD REG BVL

## (undated) DEVICE — STERILE POLYISOPRENE POWDER-FREE SURGICAL GLOVES: Brand: PROTEXIS

## (undated) DEVICE — SOLUTION IV 1000ML 0.9% SOD CHL

## (undated) DEVICE — WAX SURG 2.5GM HEMSTAT BNE BEESWAX PARAFFIN ISO PALMITATE

## (undated) DEVICE — FLEX ADVANTAGE 3000CC: Brand: FLEX ADVANTAGE

## (undated) DEVICE — STOCKING COMPR M L29-31IN REG 19MMHG ANK 8-9IN CALF 12-15IN

## (undated) DEVICE — REM POLYHESIVE ADULT PATIENT RETURN ELECTRODE: Brand: VALLEYLAB

## (undated) DEVICE — INTENDED FOR TISSUE SEPARATION, AND OTHER PROCEDURES THAT REQUIRE A SHARP SURGICAL BLADE TO PUNCTURE OR CUT.: Brand: BARD-PARKER SAFETY BLADES SIZE 20, STERILE

## (undated) DEVICE — KIT CLN UP BON SECOURS MARYV

## (undated) DEVICE — STOCKING COMPR L L29-31IN REG 19MMHG ANK 9-10IN CALF

## (undated) DEVICE — 3.0MM PRECISION NEURO (MATCH HEAD)

## (undated) DEVICE — Device

## (undated) DEVICE — WRAP COMPR BK

## (undated) DEVICE — WATERPROOF, BACTERIA PROOF DRESSING WITH ABSORBENT SEE THROUGH PAD: Brand: OPSITE POST-OP VISIBLE 10X8CM CTN 20

## (undated) DEVICE — WATERPROOF, BACTERIA PROOF DRESSING WITH ABSORBENT SEE THROUGH PAD: Brand: OPSITE POST-OP VISIBLE 15X10CM CTN 20

## (undated) DEVICE — (D)PREP SKN CHLRAPRP APPL 26ML -- CONVERT TO ITEM 371833

## (undated) DEVICE — SYR 10ML LUER LOK 1/5ML GRAD --

## (undated) DEVICE — GEL BAG FOR WRAPS --

## (undated) DEVICE — KENDALL SCD EXPRESS SLEEVES, KNEE LENGTH, MEDIUM: Brand: KENDALL SCD

## (undated) DEVICE — SUTURE VCRL SZ 1 L18IN ABSRB VLT CT-1 L36MM 1/2 CIR J741D

## (undated) DEVICE — SPIROMETER INCENT 2500ML W ONE W VLV

## (undated) DEVICE — 3M™ BAIR PAWS FLEX™ WARMING GOWN, STANDARD, 20 PER CASE 81003: Brand: BAIR PAWS™

## (undated) DEVICE — KIT POS W/ FOAM ARM CRADL SHEARGUARD CHST PD CVR FOR SPNL